# Patient Record
Sex: FEMALE | Race: WHITE | Employment: OTHER | URBAN - METROPOLITAN AREA
[De-identification: names, ages, dates, MRNs, and addresses within clinical notes are randomized per-mention and may not be internally consistent; named-entity substitution may affect disease eponyms.]

---

## 2019-08-11 ENCOUNTER — ANESTHESIA EVENT (OUTPATIENT)
Dept: SURGERY | Age: 59
DRG: 854 | End: 2019-08-11
Payer: COMMERCIAL

## 2019-08-11 ENCOUNTER — ANESTHESIA (OUTPATIENT)
Dept: SURGERY | Age: 59
DRG: 854 | End: 2019-08-11
Payer: COMMERCIAL

## 2019-08-11 ENCOUNTER — HOSPITAL ENCOUNTER (INPATIENT)
Age: 59
LOS: 4 days | Discharge: HOME | DRG: 854 | End: 2019-08-15
Attending: EMERGENCY MEDICINE | Admitting: SURGERY
Payer: COMMERCIAL

## 2019-08-11 ENCOUNTER — APPOINTMENT (OUTPATIENT)
Dept: CT IMAGING | Age: 59
DRG: 854 | End: 2019-08-11
Attending: NURSE PRACTITIONER
Payer: COMMERCIAL

## 2019-08-11 DIAGNOSIS — K80.00 ACUTE CHOLECYSTITIS DUE TO BILIARY CALCULUS: ICD-10-CM

## 2019-08-11 DIAGNOSIS — K81.0 ACUTE CHOLECYSTITIS: Primary | ICD-10-CM

## 2019-08-11 LAB
ALBUMIN SERPL-MCNC: 3.6 G/DL (ref 3.5–5)
ALBUMIN/GLOB SERPL: 0.7 {RATIO} (ref 1.1–2.2)
ALP SERPL-CCNC: 106 U/L (ref 45–117)
ALT SERPL-CCNC: 47 U/L (ref 12–78)
ANION GAP SERPL CALC-SCNC: 8 MMOL/L (ref 5–15)
APPEARANCE UR: CLEAR
AST SERPL-CCNC: 35 U/L (ref 15–37)
BACTERIA URNS QL MICRO: NEGATIVE /HPF
BASOPHILS # BLD: 0.1 K/UL (ref 0–0.1)
BASOPHILS NFR BLD: 1 % (ref 0–1)
BILIRUB SERPL-MCNC: 1 MG/DL (ref 0.2–1)
BILIRUB UR QL: NEGATIVE
BUN SERPL-MCNC: 10 MG/DL (ref 6–20)
BUN/CREAT SERPL: 11 (ref 12–20)
CALCIUM SERPL-MCNC: 9.2 MG/DL (ref 8.5–10.1)
CHLORIDE SERPL-SCNC: 99 MMOL/L (ref 97–108)
CO2 SERPL-SCNC: 30 MMOL/L (ref 21–32)
COLOR UR: ABNORMAL
COMMENT, HOLDF: NORMAL
CREAT SERPL-MCNC: 0.91 MG/DL (ref 0.55–1.02)
DIFFERENTIAL METHOD BLD: ABNORMAL
EOSINOPHIL # BLD: 0 K/UL (ref 0–0.4)
EOSINOPHIL NFR BLD: 0 % (ref 0–7)
EPITH CASTS URNS QL MICRO: ABNORMAL /LPF
ERYTHROCYTE [DISTWIDTH] IN BLOOD BY AUTOMATED COUNT: 12.2 % (ref 11.5–14.5)
GLOBULIN SER CALC-MCNC: 4.9 G/DL (ref 2–4)
GLUCOSE SERPL-MCNC: 196 MG/DL (ref 65–100)
GLUCOSE UR STRIP.AUTO-MCNC: NEGATIVE MG/DL
HCT VFR BLD AUTO: 46.7 % (ref 35–47)
HGB BLD-MCNC: 15.9 G/DL (ref 11.5–16)
HGB UR QL STRIP: NEGATIVE
HYALINE CASTS URNS QL MICRO: ABNORMAL /LPF (ref 0–5)
IMM GRANULOCYTES # BLD AUTO: 0.1 K/UL (ref 0–0.04)
IMM GRANULOCYTES NFR BLD AUTO: 1 % (ref 0–0.5)
KETONES UR QL STRIP.AUTO: 15 MG/DL
LACTATE SERPL-SCNC: 1.5 MMOL/L (ref 0.4–2)
LEUKOCYTE ESTERASE UR QL STRIP.AUTO: ABNORMAL
LIPASE SERPL-CCNC: 135 U/L (ref 73–393)
LYMPHOCYTES # BLD: 1.1 K/UL (ref 0.8–3.5)
LYMPHOCYTES NFR BLD: 10 % (ref 12–49)
MCH RBC QN AUTO: 28.6 PG (ref 26–34)
MCHC RBC AUTO-ENTMCNC: 34 G/DL (ref 30–36.5)
MCV RBC AUTO: 84 FL (ref 80–99)
MONOCYTES # BLD: 0.6 K/UL (ref 0–1)
MONOCYTES NFR BLD: 5 % (ref 5–13)
NEUTS SEG # BLD: 9.1 K/UL (ref 1.8–8)
NEUTS SEG NFR BLD: 83 % (ref 32–75)
NITRITE UR QL STRIP.AUTO: NEGATIVE
NRBC # BLD: 0 K/UL (ref 0–0.01)
NRBC BLD-RTO: 0 PER 100 WBC
PH UR STRIP: 6 [PH] (ref 5–8)
PLATELET # BLD AUTO: 216 K/UL (ref 150–400)
PMV BLD AUTO: 10.7 FL (ref 8.9–12.9)
POTASSIUM SERPL-SCNC: 3.6 MMOL/L (ref 3.5–5.1)
PROT SERPL-MCNC: 8.5 G/DL (ref 6.4–8.2)
PROT UR STRIP-MCNC: ABNORMAL MG/DL
RBC # BLD AUTO: 5.56 M/UL (ref 3.8–5.2)
RBC #/AREA URNS HPF: ABNORMAL /HPF (ref 0–5)
SAMPLES BEING HELD,HOLD: NORMAL
SODIUM SERPL-SCNC: 137 MMOL/L (ref 136–145)
SP GR UR REFRACTOMETRY: 1.01 (ref 1–1.03)
TROPONIN I SERPL-MCNC: <0.05 NG/ML
UR CULT HOLD, URHOLD: NORMAL
UROBILINOGEN UR QL STRIP.AUTO: 1 EU/DL (ref 0.2–1)
WBC # BLD AUTO: 11 K/UL (ref 3.6–11)
WBC URNS QL MICRO: ABNORMAL /HPF (ref 0–4)

## 2019-08-11 PROCEDURE — 99218 HC RM OBSERVATION: CPT

## 2019-08-11 PROCEDURE — 77030008684 HC TU ET CUF COVD -B: Performed by: ANESTHESIOLOGY

## 2019-08-11 PROCEDURE — 77030002933 HC SUT MCRYL J&J -A: Performed by: SURGERY

## 2019-08-11 PROCEDURE — 83605 ASSAY OF LACTIC ACID: CPT

## 2019-08-11 PROCEDURE — 74011000250 HC RX REV CODE- 250: Performed by: NURSE ANESTHETIST, CERTIFIED REGISTERED

## 2019-08-11 PROCEDURE — 77030009403 HC ELECTRD ENDO MEGA -B: Performed by: SURGERY

## 2019-08-11 PROCEDURE — 74177 CT ABD & PELVIS W/CONTRAST: CPT

## 2019-08-11 PROCEDURE — 76060000033 HC ANESTHESIA 1 TO 1.5 HR: Performed by: SURGERY

## 2019-08-11 PROCEDURE — 77030008756 HC TU IRR SUC STRY -B: Performed by: SURGERY

## 2019-08-11 PROCEDURE — 74011636320 HC RX REV CODE- 636/320: Performed by: RADIOLOGY

## 2019-08-11 PROCEDURE — 77030011992 HC AIRWY NASOPHGL TELE -A: Performed by: ANESTHESIOLOGY

## 2019-08-11 PROCEDURE — 88304 TISSUE EXAM BY PATHOLOGIST: CPT

## 2019-08-11 PROCEDURE — 74011250636 HC RX REV CODE- 250/636: Performed by: NURSE PRACTITIONER

## 2019-08-11 PROCEDURE — 96361 HYDRATE IV INFUSION ADD-ON: CPT

## 2019-08-11 PROCEDURE — 77030039266 HC ADH SKN EXOFIN S2SG -A: Performed by: SURGERY

## 2019-08-11 PROCEDURE — 77030031139 HC SUT VCRL2 J&J -A: Performed by: SURGERY

## 2019-08-11 PROCEDURE — 77030008602 HC TRCR ENDOSC EPATH J&J -B: Performed by: SURGERY

## 2019-08-11 PROCEDURE — 77030011640 HC PAD GRND REM COVD -A: Performed by: SURGERY

## 2019-08-11 PROCEDURE — 74011250636 HC RX REV CODE- 250/636: Performed by: SURGERY

## 2019-08-11 PROCEDURE — 85025 COMPLETE CBC W/AUTO DIFF WBC: CPT

## 2019-08-11 PROCEDURE — 74011000250 HC RX REV CODE- 250: Performed by: SURGERY

## 2019-08-11 PROCEDURE — 74011250636 HC RX REV CODE- 250/636: Performed by: NURSE ANESTHETIST, CERTIFIED REGISTERED

## 2019-08-11 PROCEDURE — 96374 THER/PROPH/DIAG INJ IV PUSH: CPT

## 2019-08-11 PROCEDURE — 77030007955 HC PCH ENDOSC SPEC J&J -B: Performed by: SURGERY

## 2019-08-11 PROCEDURE — 0FT44ZZ RESECTION OF GALLBLADDER, PERCUTANEOUS ENDOSCOPIC APPROACH: ICD-10-PCS | Performed by: SURGERY

## 2019-08-11 PROCEDURE — 76210000017 HC OR PH I REC 1.5 TO 2 HR: Performed by: SURGERY

## 2019-08-11 PROCEDURE — 77030019908 HC STETH ESOPH SIMS -A: Performed by: ANESTHESIOLOGY

## 2019-08-11 PROCEDURE — 93005 ELECTROCARDIOGRAM TRACING: CPT

## 2019-08-11 PROCEDURE — 74011250636 HC RX REV CODE- 250/636: Performed by: ANESTHESIOLOGY

## 2019-08-11 PROCEDURE — 65270000029 HC RM PRIVATE

## 2019-08-11 PROCEDURE — 77030008603 HC TRCR ENDOSC EPATH J&J -C: Performed by: SURGERY

## 2019-08-11 PROCEDURE — 77030012029 HC APPL CLP LIG COVD -C: Performed by: SURGERY

## 2019-08-11 PROCEDURE — 74011000250 HC RX REV CODE- 250: Performed by: ANESTHESIOLOGY

## 2019-08-11 PROCEDURE — 80053 COMPREHEN METABOLIC PANEL: CPT

## 2019-08-11 PROCEDURE — 36415 COLL VENOUS BLD VENIPUNCTURE: CPT

## 2019-08-11 PROCEDURE — 77030003580 HC NDL INSUF VERES J&J -B: Performed by: SURGERY

## 2019-08-11 PROCEDURE — 83690 ASSAY OF LIPASE: CPT

## 2019-08-11 PROCEDURE — 74011000258 HC RX REV CODE- 258: Performed by: EMERGENCY MEDICINE

## 2019-08-11 PROCEDURE — 77030018836 HC SOL IRR NACL ICUM -A: Performed by: SURGERY

## 2019-08-11 PROCEDURE — 81001 URINALYSIS AUTO W/SCOPE: CPT

## 2019-08-11 PROCEDURE — 76010000149 HC OR TIME 1 TO 1.5 HR: Performed by: SURGERY

## 2019-08-11 PROCEDURE — 77030026438 HC STYL ET INTUB CARD -A: Performed by: ANESTHESIOLOGY

## 2019-08-11 PROCEDURE — 74011250636 HC RX REV CODE- 250/636: Performed by: EMERGENCY MEDICINE

## 2019-08-11 PROCEDURE — 84484 ASSAY OF TROPONIN QUANT: CPT

## 2019-08-11 PROCEDURE — 99285 EMERGENCY DEPT VISIT HI MDM: CPT

## 2019-08-11 RX ORDER — LABETALOL HYDROCHLORIDE 5 MG/ML
10 INJECTION, SOLUTION INTRAVENOUS ONCE
Status: COMPLETED | OUTPATIENT
Start: 2019-08-11 | End: 2019-08-11

## 2019-08-11 RX ORDER — FENTANYL CITRATE 50 UG/ML
INJECTION, SOLUTION INTRAMUSCULAR; INTRAVENOUS AS NEEDED
Status: DISCONTINUED | OUTPATIENT
Start: 2019-08-11 | End: 2019-08-11 | Stop reason: HOSPADM

## 2019-08-11 RX ORDER — LIDOCAINE HYDROCHLORIDE 10 MG/ML
0.1 INJECTION, SOLUTION EPIDURAL; INFILTRATION; INTRACAUDAL; PERINEURAL AS NEEDED
Status: CANCELLED | OUTPATIENT
Start: 2019-08-11

## 2019-08-11 RX ORDER — NEOSTIGMINE METHYLSULFATE 1 MG/ML
INJECTION INTRAVENOUS AS NEEDED
Status: DISCONTINUED | OUTPATIENT
Start: 2019-08-11 | End: 2019-08-11 | Stop reason: HOSPADM

## 2019-08-11 RX ORDER — SODIUM CHLORIDE 0.9 % (FLUSH) 0.9 %
5-40 SYRINGE (ML) INJECTION EVERY 8 HOURS
Status: CANCELLED | OUTPATIENT
Start: 2019-08-11

## 2019-08-11 RX ORDER — ONDANSETRON 2 MG/ML
4 INJECTION INTRAMUSCULAR; INTRAVENOUS
Status: DISCONTINUED | OUTPATIENT
Start: 2019-08-11 | End: 2019-08-15 | Stop reason: HOSPADM

## 2019-08-11 RX ORDER — ROCURONIUM BROMIDE 10 MG/ML
INJECTION, SOLUTION INTRAVENOUS AS NEEDED
Status: DISCONTINUED | OUTPATIENT
Start: 2019-08-11 | End: 2019-08-11 | Stop reason: HOSPADM

## 2019-08-11 RX ORDER — AMOXICILLIN AND CLAVULANATE POTASSIUM 875; 125 MG/1; MG/1
TABLET, FILM COATED ORAL EVERY 12 HOURS
COMMUNITY
End: 2019-08-15

## 2019-08-11 RX ORDER — LIDOCAINE HYDROCHLORIDE 20 MG/ML
INJECTION, SOLUTION EPIDURAL; INFILTRATION; INTRACAUDAL; PERINEURAL AS NEEDED
Status: DISCONTINUED | OUTPATIENT
Start: 2019-08-11 | End: 2019-08-11 | Stop reason: HOSPADM

## 2019-08-11 RX ORDER — BUPIVACAINE HYDROCHLORIDE AND EPINEPHRINE 5; 5 MG/ML; UG/ML
INJECTION, SOLUTION EPIDURAL; INTRACAUDAL; PERINEURAL AS NEEDED
Status: DISCONTINUED | OUTPATIENT
Start: 2019-08-11 | End: 2019-08-11 | Stop reason: HOSPADM

## 2019-08-11 RX ORDER — SODIUM CHLORIDE, SODIUM LACTATE, POTASSIUM CHLORIDE, CALCIUM CHLORIDE 600; 310; 30; 20 MG/100ML; MG/100ML; MG/100ML; MG/100ML
125 INJECTION, SOLUTION INTRAVENOUS CONTINUOUS
Status: DISCONTINUED | OUTPATIENT
Start: 2019-08-11 | End: 2019-08-11 | Stop reason: HOSPADM

## 2019-08-11 RX ORDER — SODIUM CHLORIDE 0.9 % (FLUSH) 0.9 %
5-40 SYRINGE (ML) INJECTION AS NEEDED
Status: DISCONTINUED | OUTPATIENT
Start: 2019-08-11 | End: 2019-08-11 | Stop reason: HOSPADM

## 2019-08-11 RX ORDER — OXYCODONE AND ACETAMINOPHEN 5; 325 MG/1; MG/1
1-2 TABLET ORAL
Status: DISCONTINUED | OUTPATIENT
Start: 2019-08-11 | End: 2019-08-15 | Stop reason: HOSPADM

## 2019-08-11 RX ORDER — AMOXICILLIN AND CLAVULANATE POTASSIUM 875; 125 MG/1; MG/1
1 TABLET, FILM COATED ORAL 2 TIMES DAILY
Qty: 14 TAB | Refills: 0 | Status: SHIPPED | OUTPATIENT
Start: 2019-08-11 | End: 2019-08-22

## 2019-08-11 RX ORDER — DEXAMETHASONE SODIUM PHOSPHATE 4 MG/ML
INJECTION, SOLUTION INTRA-ARTICULAR; INTRALESIONAL; INTRAMUSCULAR; INTRAVENOUS; SOFT TISSUE AS NEEDED
Status: DISCONTINUED | OUTPATIENT
Start: 2019-08-11 | End: 2019-08-11 | Stop reason: HOSPADM

## 2019-08-11 RX ORDER — ONDANSETRON 2 MG/ML
INJECTION INTRAMUSCULAR; INTRAVENOUS AS NEEDED
Status: DISCONTINUED | OUTPATIENT
Start: 2019-08-11 | End: 2019-08-11 | Stop reason: HOSPADM

## 2019-08-11 RX ORDER — MORPHINE SULFATE 4 MG/ML
2 INJECTION INTRAVENOUS
Status: DISCONTINUED | OUTPATIENT
Start: 2019-08-11 | End: 2019-08-15 | Stop reason: HOSPADM

## 2019-08-11 RX ORDER — SODIUM CHLORIDE, SODIUM LACTATE, POTASSIUM CHLORIDE, CALCIUM CHLORIDE 600; 310; 30; 20 MG/100ML; MG/100ML; MG/100ML; MG/100ML
125 INJECTION, SOLUTION INTRAVENOUS CONTINUOUS
Status: CANCELLED | OUTPATIENT
Start: 2019-08-11 | End: 2019-08-12

## 2019-08-11 RX ORDER — SODIUM CHLORIDE, SODIUM LACTATE, POTASSIUM CHLORIDE, CALCIUM CHLORIDE 600; 310; 30; 20 MG/100ML; MG/100ML; MG/100ML; MG/100ML
INJECTION, SOLUTION INTRAVENOUS
Status: DISCONTINUED | OUTPATIENT
Start: 2019-08-11 | End: 2019-08-11 | Stop reason: HOSPADM

## 2019-08-11 RX ORDER — HYDRALAZINE HYDROCHLORIDE 20 MG/ML
5 INJECTION INTRAMUSCULAR; INTRAVENOUS ONCE
Status: COMPLETED | OUTPATIENT
Start: 2019-08-11 | End: 2019-08-11

## 2019-08-11 RX ORDER — SODIUM CHLORIDE 0.9 % (FLUSH) 0.9 %
5-40 SYRINGE (ML) INJECTION AS NEEDED
Status: CANCELLED | OUTPATIENT
Start: 2019-08-11

## 2019-08-11 RX ORDER — DIPHENHYDRAMINE HYDROCHLORIDE 50 MG/ML
12.5 INJECTION, SOLUTION INTRAMUSCULAR; INTRAVENOUS AS NEEDED
Status: DISCONTINUED | OUTPATIENT
Start: 2019-08-11 | End: 2019-08-11 | Stop reason: HOSPADM

## 2019-08-11 RX ORDER — OXYCODONE AND ACETAMINOPHEN 5; 325 MG/1; MG/1
1-2 TABLET ORAL
Qty: 25 TAB | Refills: 0 | Status: SHIPPED | OUTPATIENT
Start: 2019-08-11 | End: 2019-08-16

## 2019-08-11 RX ORDER — LABETALOL HYDROCHLORIDE 5 MG/ML
INJECTION, SOLUTION INTRAVENOUS AS NEEDED
Status: DISCONTINUED | OUTPATIENT
Start: 2019-08-11 | End: 2019-08-11 | Stop reason: HOSPADM

## 2019-08-11 RX ORDER — PROPOFOL 10 MG/ML
INJECTION, EMULSION INTRAVENOUS AS NEEDED
Status: DISCONTINUED | OUTPATIENT
Start: 2019-08-11 | End: 2019-08-11 | Stop reason: HOSPADM

## 2019-08-11 RX ORDER — HYDROMORPHONE HYDROCHLORIDE 1 MG/ML
.25-1 INJECTION, SOLUTION INTRAMUSCULAR; INTRAVENOUS; SUBCUTANEOUS
Status: DISCONTINUED | OUTPATIENT
Start: 2019-08-11 | End: 2019-08-11 | Stop reason: HOSPADM

## 2019-08-11 RX ORDER — FLUMAZENIL 0.1 MG/ML
0.2 INJECTION INTRAVENOUS
Status: CANCELLED | OUTPATIENT
Start: 2019-08-11

## 2019-08-11 RX ORDER — GLYCOPYRROLATE 0.2 MG/ML
INJECTION INTRAMUSCULAR; INTRAVENOUS AS NEEDED
Status: DISCONTINUED | OUTPATIENT
Start: 2019-08-11 | End: 2019-08-11 | Stop reason: HOSPADM

## 2019-08-11 RX ORDER — SUCCINYLCHOLINE CHLORIDE 20 MG/ML
INJECTION INTRAMUSCULAR; INTRAVENOUS AS NEEDED
Status: DISCONTINUED | OUTPATIENT
Start: 2019-08-11 | End: 2019-08-11 | Stop reason: HOSPADM

## 2019-08-11 RX ORDER — NALOXONE HYDROCHLORIDE 0.4 MG/ML
0.04 INJECTION, SOLUTION INTRAMUSCULAR; INTRAVENOUS; SUBCUTANEOUS
Status: CANCELLED | OUTPATIENT
Start: 2019-08-11

## 2019-08-11 RX ORDER — HYDROMORPHONE HYDROCHLORIDE 2 MG/ML
INJECTION, SOLUTION INTRAMUSCULAR; INTRAVENOUS; SUBCUTANEOUS AS NEEDED
Status: DISCONTINUED | OUTPATIENT
Start: 2019-08-11 | End: 2019-08-11 | Stop reason: HOSPADM

## 2019-08-11 RX ORDER — SODIUM CHLORIDE, SODIUM LACTATE, POTASSIUM CHLORIDE, CALCIUM CHLORIDE 600; 310; 30; 20 MG/100ML; MG/100ML; MG/100ML; MG/100ML
125 INJECTION, SOLUTION INTRAVENOUS CONTINUOUS
Status: DISCONTINUED | OUTPATIENT
Start: 2019-08-11 | End: 2019-08-12

## 2019-08-11 RX ORDER — SODIUM CHLORIDE 0.9 % (FLUSH) 0.9 %
5-40 SYRINGE (ML) INJECTION EVERY 8 HOURS
Status: DISCONTINUED | OUTPATIENT
Start: 2019-08-11 | End: 2019-08-11 | Stop reason: HOSPADM

## 2019-08-11 RX ORDER — MIDAZOLAM HYDROCHLORIDE 1 MG/ML
INJECTION, SOLUTION INTRAMUSCULAR; INTRAVENOUS AS NEEDED
Status: DISCONTINUED | OUTPATIENT
Start: 2019-08-11 | End: 2019-08-11 | Stop reason: HOSPADM

## 2019-08-11 RX ADMIN — NEOSTIGMINE METHYLSULFATE 3 MG: 1 INJECTION, SOLUTION INTRAVENOUS at 20:58

## 2019-08-11 RX ADMIN — FENTANYL CITRATE 200 MCG: 50 INJECTION, SOLUTION INTRAMUSCULAR; INTRAVENOUS at 20:03

## 2019-08-11 RX ADMIN — ONDANSETRON 4 MG: 2 INJECTION INTRAMUSCULAR; INTRAVENOUS at 20:46

## 2019-08-11 RX ADMIN — IOPAMIDOL 100 ML: 755 INJECTION, SOLUTION INTRAVENOUS at 17:41

## 2019-08-11 RX ADMIN — LABETALOL HYDROCHLORIDE 10 MG: 5 INJECTION INTRAVENOUS at 20:22

## 2019-08-11 RX ADMIN — FENTANYL CITRATE 50 MCG: 50 INJECTION, SOLUTION INTRAMUSCULAR; INTRAVENOUS at 20:43

## 2019-08-11 RX ADMIN — SUCCINYLCHOLINE CHLORIDE 100 MG: 20 INJECTION, SOLUTION INTRAMUSCULAR; INTRAVENOUS; PARENTERAL at 20:03

## 2019-08-11 RX ADMIN — SODIUM CHLORIDE, POTASSIUM CHLORIDE, SODIUM LACTATE AND CALCIUM CHLORIDE: 600; 310; 30; 20 INJECTION, SOLUTION INTRAVENOUS at 20:30

## 2019-08-11 RX ADMIN — PIPERACILLIN SODIUM,TAZOBACTAM SODIUM 3.38 G: 3; .375 INJECTION, POWDER, FOR SOLUTION INTRAVENOUS at 19:10

## 2019-08-11 RX ADMIN — HYDROMORPHONE HYDROCHLORIDE 0.5 MG: 1 INJECTION, SOLUTION INTRAMUSCULAR; INTRAVENOUS; SUBCUTANEOUS at 21:55

## 2019-08-11 RX ADMIN — PROPOFOL 200 MG: 10 INJECTION, EMULSION INTRAVENOUS at 20:03

## 2019-08-11 RX ADMIN — SODIUM CHLORIDE 1000 ML: 900 INJECTION, SOLUTION INTRAVENOUS at 17:13

## 2019-08-11 RX ADMIN — SODIUM CHLORIDE, SODIUM LACTATE, POTASSIUM CHLORIDE, AND CALCIUM CHLORIDE 125 ML/HR: 600; 310; 30; 20 INJECTION, SOLUTION INTRAVENOUS at 21:54

## 2019-08-11 RX ADMIN — ROCURONIUM BROMIDE 10 MG: 50 INJECTION, SOLUTION INTRAVENOUS at 20:03

## 2019-08-11 RX ADMIN — MIDAZOLAM HYDROCHLORIDE 2 MG: 1 INJECTION, SOLUTION INTRAMUSCULAR; INTRAVENOUS at 19:56

## 2019-08-11 RX ADMIN — LABETALOL HYDROCHLORIDE 10 MG: 5 INJECTION INTRAVENOUS at 22:04

## 2019-08-11 RX ADMIN — SODIUM CHLORIDE, POTASSIUM CHLORIDE, SODIUM LACTATE AND CALCIUM CHLORIDE: 600; 310; 30; 20 INJECTION, SOLUTION INTRAVENOUS at 19:56

## 2019-08-11 RX ADMIN — HYDROMORPHONE HYDROCHLORIDE 1 MG: 2 INJECTION INTRAMUSCULAR; INTRAVENOUS; SUBCUTANEOUS at 21:04

## 2019-08-11 RX ADMIN — HYDRALAZINE HYDROCHLORIDE 5 MG: 20 INJECTION INTRAMUSCULAR; INTRAVENOUS at 22:23

## 2019-08-11 RX ADMIN — GLYCOPYRROLATE 0.4 MG: 0.2 INJECTION, SOLUTION INTRAMUSCULAR; INTRAVENOUS at 20:58

## 2019-08-11 RX ADMIN — LIDOCAINE HYDROCHLORIDE 100 MG: 20 INJECTION, SOLUTION INTRAVENOUS at 20:03

## 2019-08-11 RX ADMIN — ROCURONIUM BROMIDE 20 MG: 50 INJECTION, SOLUTION INTRAVENOUS at 20:08

## 2019-08-11 RX ADMIN — DEXAMETHASONE SODIUM PHOSPHATE 4 MG: 4 INJECTION, SOLUTION INTRAMUSCULAR; INTRAVENOUS at 20:08

## 2019-08-11 NOTE — ED PROVIDER NOTES
61 y.o. female with no significant past medical history who presents from home via private vehicle with chief complaint of abdominal pain. Patient c/o RUQ and epigastric \"deep and intense\" abdominal pain for 2.5 weeks that significantly worsened last night after eating. He also c/o nausea, \"bilious vomiting\", and intermittent chest pain \"that is coming from my abdomen\". Denies previous GI history. Denies taking any baseline medication. Specifically denies fever, chills, night sweats, dyspnea, and any other pain or symptoms. There are no other acute medical concerns at this time. Social hx: Never tobacco smoker; Yes EtOH use; Denies illicit drug use  PCP: None    Note written by Carly Lemus, as dictated by Cristal Story NP 4:16 PM    The history is provided by the patient and medical records. No  was used. History reviewed. No pertinent past medical history. History reviewed. No pertinent surgical history. Family History:   Family history unknown: Yes       Social History     Socioeconomic History    Marital status:      Spouse name: Not on file    Number of children: Not on file    Years of education: Not on file    Highest education level: Not on file   Occupational History    Not on file   Social Needs    Financial resource strain: Not on file    Food insecurity:     Worry: Not on file     Inability: Not on file    Transportation needs:     Medical: Not on file     Non-medical: Not on file   Tobacco Use    Smoking status: Never Smoker    Smokeless tobacco: Never Used   Substance and Sexual Activity    Alcohol use:  Yes     Alcohol/week: 1.0 standard drinks     Types: 1 Glasses of wine per week     Comment: 1-3 drinks per month    Drug use: Never    Sexual activity: Not on file   Lifestyle    Physical activity:     Days per week: Not on file     Minutes per session: Not on file    Stress: Not on file   Relationships    Social connections:     Talks on phone: Not on file     Gets together: Not on file     Attends Adventism service: Not on file     Active member of club or organization: Not on file     Attends meetings of clubs or organizations: Not on file     Relationship status: Not on file    Intimate partner violence:     Fear of current or ex partner: Not on file     Emotionally abused: Not on file     Physically abused: Not on file     Forced sexual activity: Not on file   Other Topics Concern    Not on file   Social History Narrative    Not on file         ALLERGIES: Patient has no known allergies. Review of Systems   Constitutional: Negative for chills, diaphoresis and fever. Respiratory: Negative for shortness of breath. Cardiovascular: Positive for chest pain. Gastrointestinal: Positive for abdominal pain, nausea and vomiting. All other systems reviewed and are negative. Vitals:    08/11/19 1615 08/11/19 1617 08/11/19 1623   BP: (!) 206/105 (!) 206/105    Pulse:  (!) 122    Resp:  20    Temp:  98.9 °F (37.2 °C)    SpO2: 97% 95% 95%   Weight:  83.9 kg (185 lb)    Height:  5' 2\" (1.575 m)             Physical Exam   Constitutional: She is oriented to person, place, and time. She appears well-developed and well-nourished. Non-toxic appearance. No distress. HENT:   Head: Normocephalic and atraumatic. Right Ear: External ear normal.   Left Ear: External ear normal.   Nose: Nose normal.   Mouth/Throat: Oropharynx is clear and moist. No oropharyngeal exudate. Eyes: Pupils are equal, round, and reactive to light. Conjunctivae and EOM are normal. No scleral icterus. Neck: Normal range of motion. Neck supple. Cardiovascular: Normal rate, regular rhythm, normal heart sounds, intact distal pulses and normal pulses. Pulmonary/Chest: Effort normal and breath sounds normal. She has no wheezes. She exhibits no tenderness. Abdominal: Soft.  Normal appearance, normal aorta and bowel sounds are normal. She exhibits no distension, no abdominal bruit, no ascites, no pulsatile midline mass and no mass. There is no hepatosplenomegaly. There is tenderness in the right lower quadrant. There is positive Smith's sign. There is no rebound, no guarding, no CVA tenderness and no tenderness at McBurney's point. No hernia. Hernia confirmed negative in the ventral area. No Otto's, Grey-Ceballos's or psoas signs. No prominent varicosities. Musculoskeletal: Normal range of motion. She exhibits no edema or deformity. Lymphadenopathy:     She has no cervical adenopathy. Neurological: She is alert and oriented to person, place, and time. No cranial nerve deficit or sensory deficit. She exhibits normal muscle tone. Coordination normal.   Skin: Skin is warm and dry. Capillary refill takes less than 2 seconds. No rash noted. She is not diaphoretic. Psychiatric: She has a normal mood and affect. Her behavior is normal. Judgment and thought content normal.   Nursing note and vitals reviewed. MDM  Number of Diagnoses or Management Options  Acute cholecystitis:   Diagnosis management comments:     Almost three week of RUQ pain that became significantly worse last night. Comments on the pain being worse after eating, and can last about five hours. She is presently not having any pain. She commented on some chest pain, however she believes this to be more of the abdominal pain radiating towards her chest. She's not dyspneic or have PND symptoms. On exam, she's tender in the RUQ, but otherwise unremarkable. ECG reveals a sinus tachycardia, troponin is negative. CT with acute cholecystitis. Spoke with Dr. Pretty Smith, who plans to come see the patient. Nontoxic appearing on repeat evaluation.         Amount and/or Complexity of Data Reviewed  Clinical lab tests: ordered and reviewed  Tests in the radiology section of CPT®: ordered and reviewed  Tests in the medicine section of CPT®: reviewed and ordered  Obtain history from someone other than the patient: yes  Discuss the patient with other providers: yes  Independent visualization of images, tracings, or specimens: yes           Procedures

## 2019-08-11 NOTE — ANESTHESIA PREPROCEDURE EVALUATION
Anesthetic History   No history of anesthetic complications            Review of Systems / Medical History  Patient summary reviewed and pertinent labs reviewed    Pulmonary  Within defined limits                 Neuro/Psych   Within defined limits           Cardiovascular  Within defined limits                     GI/Hepatic/Renal  Within defined limits             Comments: Acute cholecystitis Endo/Other        Obesity     Other Findings            Physical Exam    Airway  Mallampati: III    Neck ROM: normal range of motion   Mouth opening: Normal     Cardiovascular    Rhythm: regular  Rate: normal         Dental  No notable dental hx       Pulmonary  Breath sounds clear to auscultation               Abdominal  GI exam deferred       Other Findings            Anesthetic Plan    ASA: 2  Anesthesia type: general          Induction: Intravenous  Anesthetic plan and risks discussed with: Patient

## 2019-08-11 NOTE — PROGRESS NOTES
[facility]: MED RECONCILIATION    Comments/Recommendations:   Patient alert and oriented at time of interview. Patient confirmed name and date of birth. Patient stated that she does not take any prescription medications, over-the-counter medications, supplements, or herbal supplements. Preferred pharmacy is [facility] on [address]. Information obtained from: patient    Allergies: Patient has no known allergies.     Prior to Admission Medications:  None     Aakash Barrios, Pharmacist

## 2019-08-11 NOTE — ED TRIAGE NOTES
Pt c/o RUQ pain and mid sternal dull chest pain that became worse today and last night; pt states has been feeling bad x 2 weeks. C/o mild nausea with small bile like vomiting. No SOB. Skin warm and dry, anxious.

## 2019-08-12 LAB
ATRIAL RATE: 103 BPM
CALCULATED P AXIS, ECG09: 33 DEGREES
CALCULATED R AXIS, ECG10: 36 DEGREES
CALCULATED T AXIS, ECG11: 40 DEGREES
DIAGNOSIS, 93000: NORMAL
P-R INTERVAL, ECG05: 124 MS
Q-T INTERVAL, ECG07: 366 MS
QRS DURATION, ECG06: 96 MS
QTC CALCULATION (BEZET), ECG08: 479 MS
VENTRICULAR RATE, ECG03: 103 BPM

## 2019-08-12 PROCEDURE — 77010033678 HC OXYGEN DAILY

## 2019-08-12 PROCEDURE — 65270000029 HC RM PRIVATE

## 2019-08-12 PROCEDURE — 74011000258 HC RX REV CODE- 258: Performed by: SURGERY

## 2019-08-12 PROCEDURE — 74011250636 HC RX REV CODE- 250/636: Performed by: SURGERY

## 2019-08-12 PROCEDURE — 99218 HC RM OBSERVATION: CPT

## 2019-08-12 PROCEDURE — 94760 N-INVAS EAR/PLS OXIMETRY 1: CPT

## 2019-08-12 PROCEDURE — 74011250637 HC RX REV CODE- 250/637: Performed by: SURGERY

## 2019-08-12 PROCEDURE — 77030027138 HC INCENT SPIROMETER -A

## 2019-08-12 RX ADMIN — PIPERACILLIN SODIUM,TAZOBACTAM SODIUM 3.38 G: 3; .375 INJECTION, POWDER, FOR SOLUTION INTRAVENOUS at 16:44

## 2019-08-12 RX ADMIN — SODIUM CHLORIDE, SODIUM LACTATE, POTASSIUM CHLORIDE, AND CALCIUM CHLORIDE 125 ML/HR: 600; 310; 30; 20 INJECTION, SOLUTION INTRAVENOUS at 09:16

## 2019-08-12 RX ADMIN — OXYCODONE HYDROCHLORIDE AND ACETAMINOPHEN 1 TABLET: 5; 325 TABLET ORAL at 07:47

## 2019-08-12 RX ADMIN — PIPERACILLIN SODIUM,TAZOBACTAM SODIUM 3.38 G: 3; .375 INJECTION, POWDER, FOR SOLUTION INTRAVENOUS at 09:06

## 2019-08-12 RX ADMIN — PIPERACILLIN SODIUM,TAZOBACTAM SODIUM 3.38 G: 3; .375 INJECTION, POWDER, FOR SOLUTION INTRAVENOUS at 00:23

## 2019-08-12 RX ADMIN — OXYCODONE HYDROCHLORIDE AND ACETAMINOPHEN 1 TABLET: 5; 325 TABLET ORAL at 16:44

## 2019-08-12 RX ADMIN — OXYCODONE HYDROCHLORIDE AND ACETAMINOPHEN 1 TABLET: 5; 325 TABLET ORAL at 00:23

## 2019-08-12 NOTE — PROGRESS NOTES
General Surgery Daily Progress Note    Patient: Alex Clinton MRN: [id number]  SSN: [id number]    YOB: 1960  Age: 61 y.o. Sex: female      Admit Date: 8/11/2019    Subjective:   Pain controlled, no N/V, tolerating diet. Current Facility-Administered Medications   Medication Dose Route Frequency    ondansetron (ZOFRAN) injection 4 mg  4 mg IntraVENous Q6H PRN    morphine injection 2 mg  2 mg IntraVENous Q2H PRN    oxyCODONE-acetaminophen (PERCOCET) 5-325 mg per tablet 1-2 Tab  1-2 Tab Oral Q4H PRN    lactated Ringers infusion  125 mL/hr IntraVENous CONTINUOUS    piperacillin-tazobactam (ZOSYN) 3.375 g in 0.9% sodium chloride (MBP/ADV) 100 mL  3.375 g IntraVENous Q8H        Objective:   08/12 0701 - 08/12 1900  In: 240 [P.O.:240]  Out: -   08/10 1901 - 08/12 0700  In: 1800 [I.V.:1800]  Out: 110 [Drains:60]  Patient Vitals for the past 8 hrs:   BP Temp Pulse Resp SpO2   08/12/19 0745 181/83 98.2 °F (36.8 °C) 98 20 92 %   08/12/19 0404 145/88 97.7 °F (36.5 °C) 93 16 93 %       Physical Exam:  General: Alert, cooperative, NAD  Lungs: Unlabored  Heart:  Regular rate and  rhythm  Abdomen: Soft, ATTP, mildly distended. + bowel sounds. Incisions c/d/i. SHERIDAN drain SS.    Extremities: Warm, moves all, no edema  Skin:  Warm and dry, no rash    Labs:   Recent Labs     08/11/19  1627   WBC 11.0   HGB 15.9   HCT 46.7        Recent Labs     08/11/19  1627      K 3.6   CL 99   CO2 30   *   BUN 10   CREA 0.91   CA 9.2   ALB 3.6   TBILI 1.0   SGOT 35   ALT 47       Assessment / Plan:   · POD#1 lap evy  · Abdominal exam appropriate  · Will need another 24 hours of IV ABX d/t severity of infection  · Diet as tolerated  · Mobilize  · Keep drain

## 2019-08-12 NOTE — PROGRESS NOTES
Bedside shift change report given to Karri (oncoming nurse) by RN (offgoing nurse). Report included the following information SBAR, Kardex, Intake/Output and Recent Results. Bedside shift change report given to RN (oncoming nurse) by Gideon Goldmann (offgoing nurse). Report included the following information SBAR, Kardex, Intake/Output and Recent Results.

## 2019-08-12 NOTE — H&P
[facility] [place] [facility]  HISTORY AND PHYSICAL    Name:  Chan Duggan  MR#:  [id number]  :  1960  ACCOUNT #:  [de-identified]  ADMIT DATE:  2019    ER CONSULT/ADMISSION HISTORY AND PHYSICAL    CHIEF COMPLAINT:  Acute cholecystitis. HISTORY OF PRESENT ILLNESS:  This is a 70-year-old  woman, seen, evaluated, and examined at the request of nurse practitioner, Teresita Manning of Emergency Services. The patient has an approximately 2-1/2-week history of an intermittent epigastric right upper quadrant pain. On Friday, 2019, she had a meal of roast beef sandwich with au jus sauce and has had intensified pain since then. She said she has had some mild nausea. She said she has frequent post sinus drip and gag whole night and retched a few times but never really any actual vomiting. No fevers. She may have felt clammy during the spitting up episodes but no diarrhea. No blood in the stools. No choleuria and no history of pancreatitis. She came in the emergency room and had CT scan that showed thickened gallbladder wall and some pericholecystic fluid consistent with acute cholecystitis. Request is made for surgical evaluation and possible intervention. PAST MEDICAL HISTORY:  Negative. PAST SURGICAL HISTORY:  Negative. ALLERGIES:  NO KNOWN DRUG ALLERGIES. CHRONIC HOME MEDICATIONS:  None. SOCIAL HISTORY:  She does not drink, rare glass of wine. No illicit drug use. FAMILY HISTORY:  Noncontributory. REVIEW OF SYSTEMS:  Review of systems x10 is otherwise unremarkable. PHYSICAL EXAMINATION:  GENERAL:  Reveals a pleasant plump  woman, resting comfortably in the gurney. VITAL SIGNS:  On presentation, she was slightly tachycardic and somewhat hypertensive, much better now but no fever. HEENT EXAM:  Normocephalic, atraumatic. Oral mucosa is dry and pink. Sclerae nonicteric. NECK:  Supple and nontender. Trachea is midline.   LUNGS:  Clear to auscultation anteriorly. CARDIAC EXAM:  Regular rate and rhythm. No murmurs, rubs, or gallops appreciated. ABDOMEN:  Protuberant, soft and mild right upper quadrant tenderness. No mass. No guarding. BACK:  Shows no midline or costovertebral angle tenderness or step-off. EXTREMITIES:  Show fair range of motion. No deformity. LABORATORY DATA:  WBC, hemoglobin, platelet counts are normal.  Urinalysis appears uninfected. Electrolytes, BUN and creatinine are normal.  Liver function tests are normal.  Lipase is normal.    IMAGING:  CT scan findings as described. ASSESSMENT:  Probable acute-on-chronic cholecystitis. PLAN:  Laparoscopic cholecystectomy. The patient was counseled for the same, attendant risks including bleeding, infection, conversion to open, common bile duct injury, requiring complex reconstruction, chronic postoperative diarrhea. She expressed desire to proceed as outlined.     Thank you for your kind referral.        Eh Mooney MD      [id number]  D:  08/11/2019 19:46  T:  08/11/2019 22:09  JOB #:  [id number]  CC:  Otis Metz NP

## 2019-08-12 NOTE — PROGRESS NOTES
8/12/2019 9:15 AM Met with pt and pt's . Charted address and phone numbers confirmed. Pt reported her address was listed incorrectly, pt's correct address is [place]. Observation notice provided in writing to patient and/or caregiver as well as verbal explanation of the policy. Patients who are in outpatient status also receive the Observation notice. Reason for Admission:  Acute cholecystitis. RRAT Score:  2                   Plan for utilizing home health: N/a                          Current Advanced Directive/Advance Care Plan:   Orville Girard Spouse   [phone]                          Transition of Care Plan:  Home with family     Pt lives with her  at home who will be able to assist if needed after discharge. Pt has no history of home health and does not own any dme. Pt has rx coverage and fills her scripts at the [facility] on [address] and [facility]. Pt was independent with adls and driving prior to admission. Pt's  will transport pt home at discharge. CM will follow.  USHA Santiago  Care Management Interventions  PCP Verified by CM: Yes(Pt plans to have Makenna Suh as her PCP but has not established care with this provider yet. )  MyChart Signup: No  Discharge Durable Medical Equipment: No  Physical Therapy Consult: No  Occupational Therapy Consult: No  Speech Therapy Consult: No  Current Support Network: Lives with Spouse

## 2019-08-12 NOTE — PROGRESS NOTES
Adjusted Zosyn 3.375 gm IV from Q6H to Q8H per protocol for CrCl ~67 mL/min    Thank you,  [facility]

## 2019-08-12 NOTE — ANESTHESIA POSTPROCEDURE EVALUATION
Procedure(s):  CHOLECYSTECTOMY LAPAROSCOPIC. general    Anesthesia Post Evaluation      Multimodal analgesia: multimodal analgesia not used between 6 hours prior to anesthesia start to PACU discharge  Patient location during evaluation: PACU  Patient participation: complete - patient participated  Level of consciousness: awake  Pain management: adequate  Airway patency: patent  Anesthetic complications: no  Cardiovascular status: acceptable and hemodynamically stable  Respiratory status: acceptable  Hydration status: acceptable  Post anesthesia nausea and vomiting:  controlled      Vitals Value Taken Time   /68 8/11/2019 10:36 PM   Temp 36.4 °C (97.6 °F) 8/11/2019  9:19 PM   Pulse 71 8/11/2019 10:37 PM   Resp 17 8/11/2019 10:37 PM   SpO2 95 % 8/11/2019 10:37 PM   Vitals shown include unvalidated device data.

## 2019-08-12 NOTE — PERIOP NOTES
TRANSFER - OUT REPORT:    Verbal report given to Veronica Mena RN(name) on Magdiel Dasilva  being transferred to room 421(unit) for routine post - op       Report consisted of patients Situation, Background, Assessment and   Recommendations(SBAR). Information from the following report(s) Procedure Summary, Intake/Output and MAR was reviewed with the receiving nurse. Lines:   Peripheral IV 08/11/19 Right Wrist (Active)   Site Assessment Clean, dry, & intact 8/11/2019 10:39 PM   Phlebitis Assessment 0 8/11/2019 10:39 PM   Infiltration Assessment 0 8/11/2019 10:39 PM   Dressing Status Clean, dry, & intact 8/11/2019 10:39 PM   Dressing Type Tape;Transparent 8/11/2019 10:39 PM   Hub Color/Line Status Pink; Infusing 8/11/2019 10:39 PM   Alcohol Cap Used Yes 8/11/2019 10:39 PM        Opportunity for questions and clarification was provided. Patient transported with:   Registered Nurse Phone report given to RN. Patient comfortable and family at bedside.

## 2019-08-12 NOTE — PROGRESS NOTES
Spiritual Care Assessment/Progress Note  [address]      NAME: Gladys Stone      MRN: [id number]  AGE: 61 y.o.  SEX: female  Zoroastrianism Affiliation: No preference   Language: English     8/12/2019     Total Time (in minutes): 10     Spiritual Assessment begun in [facility] POST SURG ORT 2 through conversation with:         [x]Patient        [] Family    [] Friend(s)        Reason for Consult: Request by patient     Spiritual beliefs: (Please include comment if needed)     [x] Identifies with a keron tradition: Anabaptism        [x] Supported by a keron community: [employer]            [] Claims no spiritual orientation:           [] Seeking spiritual identity:                [] Adheres to an individual form of spirituality:           [] Not able to assess:                           Identified resources for coping:      [] Prayer                               [] Music                  [] Guided Imagery     [x] Family/friends                 [] Pet visits     [] Devotional reading                         [] Unknown     [] Other:                                              Interventions offered during this visit: (See comments for more details)    Patient Interventions: Affirmation of emotions/emotional suffering, Coordination with community clergy, Coping skills reviewed/reinforced           Plan of Care:     [x] Support spiritual and/or cultural needs    [] Support AMD and/or advance care planning process      [] Support grieving process   [] Coordinate Rites and/or Rituals    [] Coordination with community clergy   [] No spiritual needs identified at this time   [] Detailed Plan of Care below (See Comments)  [] Make referral to Music Therapy  [] Make referral to Pet Therapy     [] Make referral to Addiction services  [] Make referral to [facility]  [] Make referral to Spiritual Care Partner  [] No future visits requested        [x] Follow up visits as needed     Comments:      visited patient, Mary Taylor, for an initial visit on the Post. Surgical Ortho. Mary Taylor was awake, alert, and sitting up in bed when  came to visit. Her  was sitting at the bedside.  introduced herself and extended support through active listening and pastoral presence. Mary Taylor greeted the  warmly and made good eye contact. She discussed her current hospital stay and the emergency surgery she had yesterday. She appears to be recovering well and is in good spirits.  inquired how she could best support Magnolia during her hospital stay. Mary Claudia requested to have Father Sergio visit her and  confirmed her keron preference as Hindu.  will collaborate with Father Rafa Reeves to have Magnolia's needs met.  continued to be a supportive presence as Mary Taylor discussed other concerns and engaged in story telling. She thanked the  for stopping by.  will follow up as able and/or needed  [employer] Maged Cash.      Paging Service: [phone])

## 2019-08-12 NOTE — BRIEF OP NOTE
BRIEF OPERATIVE NOTE    Date of Procedure: 8/11/2019   Preoperative Diagnosis: CHOLECYSTITIS  Postoperative Diagnosis: CHOLECYSTITIS    Procedure(s):  CHOLECYSTECTOMY LAPAROSCOPIC  Surgeon(s) and Role:     Lakia Murray MD - Primary         Surgical Assistant: [facility]    Surgical Staff:  Circ-1: Anthony Chance RN  Scrub Tech-1: Saurav Marlow  Surg Asst-1: Jordan Rodriguez  Event Time In Time Out   Incision Start 2013    Incision Close       Anesthesia: General   Estimated Blood Loss: 60cc  Specimens:   ID Type Source Tests Collected by Time Destination   1 : gallbladder Preservative Gallbladder  Wilmar Fajardo MD 8/11/2019 2034 Pathology      Findings: Severe acute on chronic cholecystitis  Complications: none  Implants: 19 Fr round SHERIDAN drain

## 2019-08-13 ENCOUNTER — APPOINTMENT (OUTPATIENT)
Dept: CT IMAGING | Age: 59
DRG: 854 | End: 2019-08-13
Attending: PHYSICIAN ASSISTANT
Payer: COMMERCIAL

## 2019-08-13 PROBLEM — A41.9 SEPSIS (HCC): Status: ACTIVE | Noted: 2019-08-13

## 2019-08-13 PROBLEM — I10 HYPERTENSION: Status: ACTIVE | Noted: 2019-08-13

## 2019-08-13 PROBLEM — Z90.49 S/P CHOLECYSTECTOMY: Status: ACTIVE | Noted: 2019-08-13

## 2019-08-13 PROBLEM — R73.9 HYPERGLYCEMIA: Status: ACTIVE | Noted: 2019-08-13

## 2019-08-13 PROBLEM — R09.02 HYPOXIA: Status: ACTIVE | Noted: 2019-08-13

## 2019-08-13 PROBLEM — R00.0 SINUS TACHYCARDIA: Status: ACTIVE | Noted: 2019-08-13

## 2019-08-13 PROBLEM — D72.829 LEUKOCYTOSIS: Status: ACTIVE | Noted: 2019-08-13

## 2019-08-13 PROBLEM — R50.9 FEVER: Status: ACTIVE | Noted: 2019-08-13

## 2019-08-13 LAB
ALBUMIN SERPL-MCNC: 2.8 G/DL (ref 3.5–5)
ALBUMIN/GLOB SERPL: 0.7 {RATIO} (ref 1.1–2.2)
ALP SERPL-CCNC: 91 U/L (ref 45–117)
ALT SERPL-CCNC: 47 U/L (ref 12–78)
ANION GAP SERPL CALC-SCNC: 8 MMOL/L (ref 5–15)
ANION GAP SERPL CALC-SCNC: 8 MMOL/L (ref 5–15)
AST SERPL-CCNC: 32 U/L (ref 15–37)
ATRIAL RATE: 127 BPM
BILIRUB DIRECT SERPL-MCNC: 0.2 MG/DL (ref 0–0.2)
BILIRUB SERPL-MCNC: 0.8 MG/DL (ref 0.2–1)
BUN SERPL-MCNC: 7 MG/DL (ref 6–20)
BUN SERPL-MCNC: 8 MG/DL (ref 6–20)
BUN/CREAT SERPL: 10 (ref 12–20)
BUN/CREAT SERPL: 8 (ref 12–20)
CALCIUM SERPL-MCNC: 8.4 MG/DL (ref 8.5–10.1)
CALCIUM SERPL-MCNC: 8.5 MG/DL (ref 8.5–10.1)
CALCULATED P AXIS, ECG09: 44 DEGREES
CALCULATED R AXIS, ECG10: -20 DEGREES
CALCULATED T AXIS, ECG11: 11 DEGREES
CHLORIDE SERPL-SCNC: 96 MMOL/L (ref 97–108)
CHLORIDE SERPL-SCNC: 98 MMOL/L (ref 97–108)
CO2 SERPL-SCNC: 28 MMOL/L (ref 21–32)
CO2 SERPL-SCNC: 28 MMOL/L (ref 21–32)
CREAT SERPL-MCNC: 0.79 MG/DL (ref 0.55–1.02)
CREAT SERPL-MCNC: 0.84 MG/DL (ref 0.55–1.02)
DIAGNOSIS, 93000: NORMAL
ERYTHROCYTE [DISTWIDTH] IN BLOOD BY AUTOMATED COUNT: 12.4 % (ref 11.5–14.5)
ERYTHROCYTE [DISTWIDTH] IN BLOOD BY AUTOMATED COUNT: 12.6 % (ref 11.5–14.5)
GLOBULIN SER CALC-MCNC: 4.1 G/DL (ref 2–4)
GLUCOSE BLD STRIP.AUTO-MCNC: 268 MG/DL (ref 65–100)
GLUCOSE SERPL-MCNC: 251 MG/DL (ref 65–100)
GLUCOSE SERPL-MCNC: 307 MG/DL (ref 65–100)
HCT VFR BLD AUTO: 39.7 % (ref 35–47)
HCT VFR BLD AUTO: 40.1 % (ref 35–47)
HGB BLD-MCNC: 13.3 G/DL (ref 11.5–16)
HGB BLD-MCNC: 13.5 G/DL (ref 11.5–16)
LACTATE SERPL-SCNC: 1.4 MMOL/L (ref 0.4–2)
MCH RBC QN AUTO: 28.1 PG (ref 26–34)
MCH RBC QN AUTO: 28.5 PG (ref 26–34)
MCHC RBC AUTO-ENTMCNC: 33.5 G/DL (ref 30–36.5)
MCHC RBC AUTO-ENTMCNC: 33.7 G/DL (ref 30–36.5)
MCV RBC AUTO: 83.9 FL (ref 80–99)
MCV RBC AUTO: 84.6 FL (ref 80–99)
NRBC # BLD: 0 K/UL (ref 0–0.01)
NRBC # BLD: 0 K/UL (ref 0–0.01)
NRBC BLD-RTO: 0 PER 100 WBC
NRBC BLD-RTO: 0 PER 100 WBC
P-R INTERVAL, ECG05: 124 MS
PLATELET # BLD AUTO: 174 K/UL (ref 150–400)
PLATELET # BLD AUTO: 185 K/UL (ref 150–400)
PMV BLD AUTO: 10.6 FL (ref 8.9–12.9)
PMV BLD AUTO: 10.7 FL (ref 8.9–12.9)
POTASSIUM SERPL-SCNC: 3.4 MMOL/L (ref 3.5–5.1)
POTASSIUM SERPL-SCNC: 3.6 MMOL/L (ref 3.5–5.1)
PROT SERPL-MCNC: 6.9 G/DL (ref 6.4–8.2)
Q-T INTERVAL, ECG07: 320 MS
QRS DURATION, ECG06: 96 MS
QTC CALCULATION (BEZET), ECG08: 465 MS
RBC # BLD AUTO: 4.73 M/UL (ref 3.8–5.2)
RBC # BLD AUTO: 4.74 M/UL (ref 3.8–5.2)
SERVICE CMNT-IMP: ABNORMAL
SODIUM SERPL-SCNC: 132 MMOL/L (ref 136–145)
SODIUM SERPL-SCNC: 134 MMOL/L (ref 136–145)
TSH SERPL DL<=0.05 MIU/L-ACNC: 0.48 UIU/ML (ref 0.36–3.74)
VENTRICULAR RATE, ECG03: 127 BPM
WBC # BLD AUTO: 13 K/UL (ref 3.6–11)
WBC # BLD AUTO: 7.7 K/UL (ref 3.6–11)

## 2019-08-13 PROCEDURE — 74011250636 HC RX REV CODE- 250/636: Performed by: SURGERY

## 2019-08-13 PROCEDURE — 74011250637 HC RX REV CODE- 250/637: Performed by: PHYSICIAN ASSISTANT

## 2019-08-13 PROCEDURE — 85027 COMPLETE CBC AUTOMATED: CPT

## 2019-08-13 PROCEDURE — 74011250636 HC RX REV CODE- 250/636: Performed by: INTERNAL MEDICINE

## 2019-08-13 PROCEDURE — 74011636637 HC RX REV CODE- 636/637: Performed by: INTERNAL MEDICINE

## 2019-08-13 PROCEDURE — 74011250636 HC RX REV CODE- 250/636: Performed by: PHYSICIAN ASSISTANT

## 2019-08-13 PROCEDURE — 65270000029 HC RM PRIVATE

## 2019-08-13 PROCEDURE — 36415 COLL VENOUS BLD VENIPUNCTURE: CPT

## 2019-08-13 PROCEDURE — 94760 N-INVAS EAR/PLS OXIMETRY 1: CPT

## 2019-08-13 PROCEDURE — 71275 CT ANGIOGRAPHY CHEST: CPT

## 2019-08-13 PROCEDURE — 74011636320 HC RX REV CODE- 636/320: Performed by: STUDENT IN AN ORGANIZED HEALTH CARE EDUCATION/TRAINING PROGRAM

## 2019-08-13 PROCEDURE — 93005 ELECTROCARDIOGRAM TRACING: CPT

## 2019-08-13 PROCEDURE — 74011250637 HC RX REV CODE- 250/637: Performed by: SURGERY

## 2019-08-13 PROCEDURE — 99218 HC RM OBSERVATION: CPT

## 2019-08-13 PROCEDURE — 84443 ASSAY THYROID STIM HORMONE: CPT

## 2019-08-13 PROCEDURE — 74177 CT ABD & PELVIS W/CONTRAST: CPT

## 2019-08-13 PROCEDURE — 80076 HEPATIC FUNCTION PANEL: CPT

## 2019-08-13 PROCEDURE — 83605 ASSAY OF LACTIC ACID: CPT

## 2019-08-13 PROCEDURE — 87040 BLOOD CULTURE FOR BACTERIA: CPT

## 2019-08-13 PROCEDURE — 80048 BASIC METABOLIC PNL TOTAL CA: CPT

## 2019-08-13 PROCEDURE — 83036 HEMOGLOBIN GLYCOSYLATED A1C: CPT

## 2019-08-13 PROCEDURE — 74011000258 HC RX REV CODE- 258: Performed by: SURGERY

## 2019-08-13 PROCEDURE — 82962 GLUCOSE BLOOD TEST: CPT

## 2019-08-13 RX ORDER — ACETAMINOPHEN 325 MG/1
650 TABLET ORAL
Status: DISCONTINUED | OUTPATIENT
Start: 2019-08-13 | End: 2019-08-15 | Stop reason: HOSPADM

## 2019-08-13 RX ORDER — ENOXAPARIN SODIUM 100 MG/ML
40 INJECTION SUBCUTANEOUS EVERY 24 HOURS
Status: DISCONTINUED | OUTPATIENT
Start: 2019-08-13 | End: 2019-08-15 | Stop reason: HOSPADM

## 2019-08-13 RX ORDER — HYDRALAZINE HYDROCHLORIDE 20 MG/ML
20 INJECTION INTRAMUSCULAR; INTRAVENOUS
Status: DISCONTINUED | OUTPATIENT
Start: 2019-08-13 | End: 2019-08-15 | Stop reason: HOSPADM

## 2019-08-13 RX ORDER — DEXTROSE, SODIUM CHLORIDE, AND POTASSIUM CHLORIDE 5; .45; .15 G/100ML; G/100ML; G/100ML
75 INJECTION INTRAVENOUS CONTINUOUS
Status: DISCONTINUED | OUTPATIENT
Start: 2019-08-13 | End: 2019-08-13

## 2019-08-13 RX ORDER — DOCUSATE SODIUM 100 MG/1
100 CAPSULE, LIQUID FILLED ORAL 2 TIMES DAILY
Status: DISCONTINUED | OUTPATIENT
Start: 2019-08-13 | End: 2019-08-15 | Stop reason: HOSPADM

## 2019-08-13 RX ORDER — ENOXAPARIN SODIUM 100 MG/ML
40 INJECTION SUBCUTANEOUS EVERY 24 HOURS
Status: DISCONTINUED | OUTPATIENT
Start: 2019-08-14 | End: 2019-08-13

## 2019-08-13 RX ORDER — SODIUM CHLORIDE 9 MG/ML
75 INJECTION, SOLUTION INTRAVENOUS CONTINUOUS
Status: DISCONTINUED | OUTPATIENT
Start: 2019-08-13 | End: 2019-08-15

## 2019-08-13 RX ORDER — DEXTROSE MONOHYDRATE 100 MG/ML
125-250 INJECTION, SOLUTION INTRAVENOUS AS NEEDED
Status: DISCONTINUED | OUTPATIENT
Start: 2019-08-13 | End: 2019-08-15 | Stop reason: HOSPADM

## 2019-08-13 RX ORDER — MAGNESIUM SULFATE 100 %
4 CRYSTALS MISCELLANEOUS AS NEEDED
Status: DISCONTINUED | OUTPATIENT
Start: 2019-08-13 | End: 2019-08-15 | Stop reason: HOSPADM

## 2019-08-13 RX ORDER — INSULIN LISPRO 100 [IU]/ML
INJECTION, SOLUTION INTRAVENOUS; SUBCUTANEOUS
Status: DISCONTINUED | OUTPATIENT
Start: 2019-08-13 | End: 2019-08-15 | Stop reason: HOSPADM

## 2019-08-13 RX ORDER — FACIAL-BODY WIPES
10 EACH TOPICAL DAILY
Status: DISCONTINUED | OUTPATIENT
Start: 2019-08-13 | End: 2019-08-15 | Stop reason: HOSPADM

## 2019-08-13 RX ADMIN — HYDRALAZINE HYDROCHLORIDE 20 MG: 20 INJECTION INTRAMUSCULAR; INTRAVENOUS at 21:06

## 2019-08-13 RX ADMIN — PIPERACILLIN SODIUM,TAZOBACTAM SODIUM 3.38 G: 3; .375 INJECTION, POWDER, FOR SOLUTION INTRAVENOUS at 01:53

## 2019-08-13 RX ADMIN — DEXTROSE MONOHYDRATE, SODIUM CHLORIDE, AND POTASSIUM CHLORIDE 75 ML/HR: 50; 4.5; 1.49 INJECTION, SOLUTION INTRAVENOUS at 18:55

## 2019-08-13 RX ADMIN — BISACODYL 10 MG: 10 SUPPOSITORY RECTAL at 11:41

## 2019-08-13 RX ADMIN — PIPERACILLIN SODIUM,TAZOBACTAM SODIUM 3.38 G: 3; .375 INJECTION, POWDER, FOR SOLUTION INTRAVENOUS at 18:57

## 2019-08-13 RX ADMIN — PIPERACILLIN SODIUM,TAZOBACTAM SODIUM 3.38 G: 3; .375 INJECTION, POWDER, FOR SOLUTION INTRAVENOUS at 09:48

## 2019-08-13 RX ADMIN — INSULIN LISPRO 2 UNITS: 100 INJECTION, SOLUTION INTRAVENOUS; SUBCUTANEOUS at 22:26

## 2019-08-13 RX ADMIN — ENOXAPARIN SODIUM 40 MG: 40 INJECTION SUBCUTANEOUS at 11:41

## 2019-08-13 RX ADMIN — DOCUSATE SODIUM 100 MG: 100 CAPSULE ORAL at 18:57

## 2019-08-13 RX ADMIN — SODIUM CHLORIDE 125 ML/HR: 900 INJECTION, SOLUTION INTRAVENOUS at 21:05

## 2019-08-13 RX ADMIN — IOPAMIDOL 100 ML: 755 INJECTION, SOLUTION INTRAVENOUS at 18:03

## 2019-08-13 RX ADMIN — DOCUSATE SODIUM 100 MG: 100 CAPSULE ORAL at 11:41

## 2019-08-13 RX ADMIN — ACETAMINOPHEN 650 MG: 325 TABLET ORAL at 05:35

## 2019-08-13 NOTE — PROGRESS NOTES
2256- Paged the on call provider regarding the patients BP and her elevated HR.     0333- Paged the on call provider reagarding the patients BP and her elevated HR.    0339- received telephone orders with read back: Dr. Chyna Hackett- STAT EKG, CBC, BMP. No meds ordered at this time. 0360- Paged On call provider Dr. Ceci Bello regarding the patients current symptoms. 6109- Received telephone order with read back from Dr. Ceci Bello: Tylenol 650 mg Q4hr PRN.

## 2019-08-13 NOTE — PROGRESS NOTES
Problem: Falls - Risk of  Goal: *Absence of Falls  Description  Document Snow Sanchez Fall Risk and appropriate interventions in the flowsheet.   Outcome: Progressing Towards Goal  Note:   Fall Risk Interventions:            Medication Interventions: Patient to call before getting OOB                   Problem: Pain  Goal: *Control of Pain  Outcome: Progressing Towards Goal

## 2019-08-13 NOTE — PROGRESS NOTES
General Surgery Daily Progress Note    Patient: Yobany Ayala MRN: [id number]  SSN: [id number]    YOB: 1960  Age: 61 y.o. Sex: female      Admit Date: 8/11/2019    Subjective:   Poor appetite but no N/V. Pain controlled. + flatus. Febrile and tachycardic overnight. Current Facility-Administered Medications   Medication Dose Route Frequency    acetaminophen (TYLENOL) tablet 650 mg  650 mg Oral Q4H PRN    ondansetron (ZOFRAN) injection 4 mg  4 mg IntraVENous Q6H PRN    morphine injection 2 mg  2 mg IntraVENous Q2H PRN    oxyCODONE-acetaminophen (PERCOCET) 5-325 mg per tablet 1-2 Tab  1-2 Tab Oral Q4H PRN    piperacillin-tazobactam (ZOSYN) 3.375 g in 0.9% sodium chloride (MBP/ADV) 100 mL  3.375 g IntraVENous Q8H        Objective:   No intake/output data recorded. 08/11 1901 - 08/13 0700  In: 2140 [P.O.:240; I.V.:1900]  Out: 190 [Drains:140]  Patient Vitals for the past 8 hrs:   BP Temp Pulse Resp SpO2   08/13/19 0949  99 °F (37.2 °C)      08/13/19 0641 167/80 99.5 °F (37.5 °C) (!) 120 18 96 %   08/13/19 0620 165/83 99.4 °F (37.4 °C) (!) 118 18 93 %   08/13/19 0505 (!) 182/95 (!) 102.1 °F (38.9 °C) (!) 120 18    08/13/19 0358 (!) 189/95 (!) 101.5 °F (38.6 °C) (!) 123 18 93 %   08/13/19 0241 (!) 178/104  (!) 130         Physical Exam:  General: Alert, cooperative, NAD  Lungs: Unlabored  Heart:  Tachycardic   Abdomen: Soft, ATTP, mildly distended. + bowel sounds. Incisions c/d/i. SHERIDAN drain SS. Extremities: Warm, moves all, no edema  Skin:  Warm and dry, no rash    Labs:   Recent Labs     08/13/19  0408   WBC 13.0*   HGB 13.5   HCT 40.1        Recent Labs     08/13/19  0408   *   K 3.6   CL 98   CO2 28   *   BUN 8   CREA 0.79   CA 8.5   ALB 2.8*   TBILI 0.8   SGOT 32   ALT 47       Assessment / Plan:   · POD#2 lap evy  · Abdominal exam appropriate  · Fever and tachycardia but clinically well appearing. Will remain in the hospital for ongoing antibiotics and monitoring. Low threshold to repeat imaging if condition declines or no improvement.    · Diet as tolerated  · Mobilize  · Keep drain   · Bowel regimen  · Lovenox for DVT prophylaxis

## 2019-08-13 NOTE — PROGRESS NOTES
Rounded on Alevism patients and provided Communion and  Anointing of the Sick at request of patient    Fr. Cristina Blue

## 2019-08-13 NOTE — PROGRESS NOTES
[address] visit. Mrs. Taveras received Access Point of Mancini's and communion today. She is hoping to go home soon and had no further spiritual needs today. Prayer offered.     NATAN Casanova, RN, ACSW, LCSW   Page:  [phone]

## 2019-08-13 NOTE — PROGRESS NOTES
Notified by Jasmin Coronel for order of CTA stat. 1925  Notified Jasmin Coronel of Patient Temp 103, and elevated BP. Instruction to call On call surgeon and notify of status so he can manage through the night. 19:30 called a code Sepsis    19:40  Notified On-call Dr. Alyssa Luo to inform of patient /95, , Temp 103, POX; 89-91%, 2L O2 via NC administered. Dr Jay Durand ordered to consult Hospitalist to mange tachycardia, and HTN. Order for TSH lab along with blood cultures. Nurse did inform MD a code Sepsis was called on patient.

## 2019-08-13 NOTE — PROGRESS NOTES
Notified Jasmin Coronel of Patient elevated /81 and continued -115. No new orders given. Discontinue Discharge order.

## 2019-08-14 PROBLEM — K81.0 ACUTE CHOLECYSTITIS: Status: ACTIVE | Noted: 2019-08-14

## 2019-08-14 LAB
ALBUMIN SERPL-MCNC: 2.5 G/DL (ref 3.5–5)
ALBUMIN/GLOB SERPL: 0.6 {RATIO} (ref 1.1–2.2)
ALP SERPL-CCNC: 80 U/L (ref 45–117)
ALT SERPL-CCNC: 35 U/L (ref 12–78)
ANION GAP SERPL CALC-SCNC: 7 MMOL/L (ref 5–15)
AST SERPL-CCNC: 19 U/L (ref 15–37)
BILIRUB SERPL-MCNC: 0.7 MG/DL (ref 0.2–1)
BUN SERPL-MCNC: 8 MG/DL (ref 6–20)
BUN/CREAT SERPL: 10 (ref 12–20)
CALCIUM SERPL-MCNC: 8.3 MG/DL (ref 8.5–10.1)
CHLORIDE SERPL-SCNC: 101 MMOL/L (ref 97–108)
CO2 SERPL-SCNC: 29 MMOL/L (ref 21–32)
CREAT SERPL-MCNC: 0.79 MG/DL (ref 0.55–1.02)
ERYTHROCYTE [DISTWIDTH] IN BLOOD BY AUTOMATED COUNT: 13 % (ref 11.5–14.5)
EST. AVERAGE GLUCOSE BLD GHB EST-MCNC: 186 MG/DL
GLOBULIN SER CALC-MCNC: 3.9 G/DL (ref 2–4)
GLUCOSE BLD STRIP.AUTO-MCNC: 159 MG/DL (ref 65–100)
GLUCOSE BLD STRIP.AUTO-MCNC: 169 MG/DL (ref 65–100)
GLUCOSE BLD STRIP.AUTO-MCNC: 228 MG/DL (ref 65–100)
GLUCOSE BLD STRIP.AUTO-MCNC: 288 MG/DL (ref 65–100)
GLUCOSE SERPL-MCNC: 224 MG/DL (ref 65–100)
HBA1C MFR BLD: 8.1 % (ref 4.2–6.3)
HCT VFR BLD AUTO: 38.1 % (ref 35–47)
HGB BLD-MCNC: 12.9 G/DL (ref 11.5–16)
MCH RBC QN AUTO: 28.8 PG (ref 26–34)
MCHC RBC AUTO-ENTMCNC: 33.9 G/DL (ref 30–36.5)
MCV RBC AUTO: 85 FL (ref 80–99)
NRBC # BLD: 0 K/UL (ref 0–0.01)
NRBC BLD-RTO: 0 PER 100 WBC
PLATELET # BLD AUTO: 162 K/UL (ref 150–400)
PMV BLD AUTO: 10.7 FL (ref 8.9–12.9)
POTASSIUM SERPL-SCNC: 3 MMOL/L (ref 3.5–5.1)
PROT SERPL-MCNC: 6.4 G/DL (ref 6.4–8.2)
RBC # BLD AUTO: 4.48 M/UL (ref 3.8–5.2)
SERVICE CMNT-IMP: ABNORMAL
SODIUM SERPL-SCNC: 137 MMOL/L (ref 136–145)
WBC # BLD AUTO: 6.6 K/UL (ref 3.6–11)

## 2019-08-14 PROCEDURE — 74011250636 HC RX REV CODE- 250/636: Performed by: SURGERY

## 2019-08-14 PROCEDURE — 94760 N-INVAS EAR/PLS OXIMETRY 1: CPT

## 2019-08-14 PROCEDURE — 65270000029 HC RM PRIVATE

## 2019-08-14 PROCEDURE — 74011636637 HC RX REV CODE- 636/637: Performed by: INTERNAL MEDICINE

## 2019-08-14 PROCEDURE — 74011250637 HC RX REV CODE- 250/637: Performed by: PHYSICIAN ASSISTANT

## 2019-08-14 PROCEDURE — 74011250636 HC RX REV CODE- 250/636: Performed by: INTERNAL MEDICINE

## 2019-08-14 PROCEDURE — 74011000258 HC RX REV CODE- 258: Performed by: SURGERY

## 2019-08-14 PROCEDURE — 36415 COLL VENOUS BLD VENIPUNCTURE: CPT

## 2019-08-14 PROCEDURE — 85027 COMPLETE CBC AUTOMATED: CPT

## 2019-08-14 PROCEDURE — 80053 COMPREHEN METABOLIC PANEL: CPT

## 2019-08-14 PROCEDURE — 74011250636 HC RX REV CODE- 250/636: Performed by: PHYSICIAN ASSISTANT

## 2019-08-14 PROCEDURE — 99218 HC RM OBSERVATION: CPT

## 2019-08-14 PROCEDURE — 82962 GLUCOSE BLOOD TEST: CPT

## 2019-08-14 RX ORDER — INSULIN GLARGINE 100 [IU]/ML
8 INJECTION, SOLUTION SUBCUTANEOUS
Status: DISCONTINUED | OUTPATIENT
Start: 2019-08-14 | End: 2019-08-15 | Stop reason: HOSPADM

## 2019-08-14 RX ORDER — POTASSIUM CHLORIDE 750 MG/1
40 TABLET, FILM COATED, EXTENDED RELEASE ORAL
Status: COMPLETED | OUTPATIENT
Start: 2019-08-14 | End: 2019-08-14

## 2019-08-14 RX ADMIN — INSULIN GLARGINE 8 UNITS: 100 INJECTION, SOLUTION SUBCUTANEOUS at 22:05

## 2019-08-14 RX ADMIN — HYDRALAZINE HYDROCHLORIDE 20 MG: 20 INJECTION INTRAMUSCULAR; INTRAVENOUS at 04:03

## 2019-08-14 RX ADMIN — POTASSIUM CHLORIDE 40 MEQ: 750 TABLET, EXTENDED RELEASE ORAL at 09:39

## 2019-08-14 RX ADMIN — INSULIN LISPRO 2 UNITS: 100 INJECTION, SOLUTION INTRAVENOUS; SUBCUTANEOUS at 07:40

## 2019-08-14 RX ADMIN — ENOXAPARIN SODIUM 40 MG: 40 INJECTION SUBCUTANEOUS at 11:53

## 2019-08-14 RX ADMIN — PIPERACILLIN SODIUM,TAZOBACTAM SODIUM 3.38 G: 3; .375 INJECTION, POWDER, FOR SOLUTION INTRAVENOUS at 01:04

## 2019-08-14 RX ADMIN — DOCUSATE SODIUM 100 MG: 100 CAPSULE ORAL at 09:39

## 2019-08-14 RX ADMIN — PIPERACILLIN SODIUM,TAZOBACTAM SODIUM 3.38 G: 3; .375 INJECTION, POWDER, FOR SOLUTION INTRAVENOUS at 17:57

## 2019-08-14 RX ADMIN — PIPERACILLIN SODIUM,TAZOBACTAM SODIUM 3.38 G: 3; .375 INJECTION, POWDER, FOR SOLUTION INTRAVENOUS at 09:39

## 2019-08-14 RX ADMIN — INSULIN LISPRO 3 UNITS: 100 INJECTION, SOLUTION INTRAVENOUS; SUBCUTANEOUS at 11:53

## 2019-08-14 RX ADMIN — DOCUSATE SODIUM 100 MG: 100 CAPSULE ORAL at 17:57

## 2019-08-14 NOTE — PROGRESS NOTES
General Surgery Daily Progress Note    Patient: Zina Willis MRN: [id number]  SSN: [id number]    YOB: 1960  Age: 61 y.o. Sex: female      Admit Date: 8/11/2019    Subjective:   Feeling well, mild pain, no N/V. Tolerating diet. Current Facility-Administered Medications   Medication Dose Route Frequency    acetaminophen (TYLENOL) tablet 650 mg  650 mg Oral Q4H PRN    docusate sodium (COLACE) capsule 100 mg  100 mg Oral BID    bisacodyl (DULCOLAX) suppository 10 mg  10 mg Rectal DAILY    enoxaparin (LOVENOX) injection 40 mg  40 mg SubCUTAneous Q24H    0.9% sodium chloride infusion  125 mL/hr IntraVENous CONTINUOUS    hydrALAZINE (APRESOLINE) 20 mg/mL injection 20 mg  20 mg IntraVENous Q6H PRN    glucose chewable tablet 16 g  4 Tab Oral PRN    glucagon (GLUCAGEN) injection 1 mg  1 mg IntraMUSCular PRN    dextrose 10% infusion 125-250 mL  125-250 mL IntraVENous PRN    insulin lispro (HUMALOG) injection   SubCUTAneous AC&HS    ondansetron (ZOFRAN) injection 4 mg  4 mg IntraVENous Q6H PRN    morphine injection 2 mg  2 mg IntraVENous Q2H PRN    oxyCODONE-acetaminophen (PERCOCET) 5-325 mg per tablet 1-2 Tab  1-2 Tab Oral Q4H PRN    piperacillin-tazobactam (ZOSYN) 3.375 g in 0.9% sodium chloride (MBP/ADV) 100 mL  3.375 g IntraVENous Q8H        Objective:   08/14 0701 - 08/14 1900  In: 1520.8 [I.V.:1520.8]  Out: 400 [Urine:400]  08/12 1901 - 08/14 0700  In: 100 [I.V.:100]  Out: 60 [Drains:60]  Patient Vitals for the past 8 hrs:   BP Temp Pulse Resp SpO2   08/14/19 0830 (!) 158/96 99 °F (37.2 °C) (!) 13 16 95 %   08/14/19 0340 150/90 98.6 °F (37 °C) (!) 102 16 95 %       Physical Exam:  General: Alert, cooperative, NAD  Lungs: Unlabored  Heart:  Tachycardic   Abdomen: Soft, ATTP, mildly distended. + bowel sounds. Incisions c/d/i. SHERIDAN drain SS.    Extremities: Warm, moves all, no edema  Skin:  Warm and dry, no rash    Labs:   Recent Labs     08/14/19  0208   WBC 6.6   HGB 12.9   HCT 38.1    Recent Labs     08/14/19  0208      K 3.0*      CO2 29   *   BUN 8   CREA 0.79   CA 8.3*   ALB 2.5*   TBILI 0.7   SGOT 19   ALT 35       Assessment / Plan:   · POD#3 lap evy  · Abdominal exam appropriate  · SIRS/sepsis: appreciate hospitalist eval. CT negative for PE, abdominal CT findings expected post-op. She is well appearing and tachycardia improving. No evidence of bile leak. Cultures pending.  Continue ABX, decrease IVF  · Diet as tolerated  · Mobilize  · Keep drain   · Bowel regimen  · Lovenox for DVT prophylaxis

## 2019-08-14 NOTE — PROGRESS NOTES
Marcelo Wu [facility]  [address]  [phone]      Medical Progress Note      NAME: Harish Callahan   :  1960  MRM:  [id number]    Date/Time: 2019  3:28 PM         Subjective:     Chief Complaint:  F/u fever    ROS:  (bold if positive, if negative)      Tolerating PT  Tolerating Diet          Objective:       Vitals:          Last 24hrs VS reviewed since prior progress note.  Most recent are:    Visit Vitals  /72 (BP Patient Position: Head of bed elevated (Comment degrees))   Pulse (!) 107   Temp 98.3 °F (36.8 °C)   Resp 18   Ht 5' 2\" (1.575 m)   Wt 83.9 kg (185 lb)   SpO2 95%   BMI 33.84 kg/m²     SpO2 Readings from Last 6 Encounters:   19 95%    O2 Flow Rate (L/min): 2 l/min       Intake/Output Summary (Last 24 hours) at 2019 1528  Last data filed at 2019 8482  Gross per 24 hour   Intake 1520.83 ml   Output 400 ml   Net 1120.83 ml          Exam:     Physical Exam:    Gen:  Well-developed, well-nourished, in no acute distress  HEENT:  Pink conjunctivae, PERRL, hearing intact to voice, moist mucous membranes  Neck:  Supple, without masses, thyroid non-tender  Resp:  No accessory muscle use, clear breath sounds without wheezes rales or rhonchi  Card:  No murmurs, normal S1, S2 without thrills, bruits or peripheral edema  Abd:  Soft, non-tender, non-distended, normoactive bowel sounds are present  Musc:  No cyanosis or clubbing  Skin:  No rashes or ulcers, skin turgor is good  Neuro:  Cranial nerves 3-12 are grossly intact,  strength is 5/5 bilaterally and dorsi / plantarflexion is 5/5 bilaterally, follows commands appropriately  Psych:  Good insight, oriented to person, place and time, alert            Medications Reviewed: (see below)    Lab Data Reviewed: (see below)    ______________________________________________________________________    Medications:     Current Facility-Administered Medications   Medication Dose Route Frequency    acetaminophen (TYLENOL) tablet 650 mg  650 mg Oral Q4H PRN    docusate sodium (COLACE) capsule 100 mg  100 mg Oral BID    bisacodyl (DULCOLAX) suppository 10 mg  10 mg Rectal DAILY    enoxaparin (LOVENOX) injection 40 mg  40 mg SubCUTAneous Q24H    0.9% sodium chloride infusion  75 mL/hr IntraVENous CONTINUOUS    hydrALAZINE (APRESOLINE) 20 mg/mL injection 20 mg  20 mg IntraVENous Q6H PRN    glucose chewable tablet 16 g  4 Tab Oral PRN    glucagon (GLUCAGEN) injection 1 mg  1 mg IntraMUSCular PRN    dextrose 10% infusion 125-250 mL  125-250 mL IntraVENous PRN    insulin lispro (HUMALOG) injection   SubCUTAneous AC&HS    ondansetron (ZOFRAN) injection 4 mg  4 mg IntraVENous Q6H PRN    morphine injection 2 mg  2 mg IntraVENous Q2H PRN    oxyCODONE-acetaminophen (PERCOCET) 5-325 mg per tablet 1-2 Tab  1-2 Tab Oral Q4H PRN    piperacillin-tazobactam (ZOSYN) 3.375 g in 0.9% sodium chloride (MBP/ADV) 100 mL  3.375 g IntraVENous Q8H            Lab Review:     Recent Labs     08/14/19  0208 08/13/19 1951 08/13/19  0408   WBC 6.6 7.7 13.0*   HGB 12.9 13.3 13.5   HCT 38.1 39.7 40.1    174 185     Recent Labs     08/14/19  0208 08/13/19 1951 08/13/19  0408 08/11/19  1627    132* 134* 137   K 3.0* 3.4* 3.6 3.6    96* 98 99   CO2 29 28 28 30   * 307* 251* 196*   BUN 8 7 8 10   CREA 0.79 0.84 0.79 0.91   CA 8.3* 8.4* 8.5 9.2   ALB 2.5*  --  2.8* 3.6   SGOT 19  --  32 35   ALT 35  --  47 47     No components found for: GLPOC         Assessment / Plan:       Acute cholecystitis due to biliary calculus / S/P cholecystectomy: POA. POD#2. Gen surgery is attending of record and will manage pain, diet, and disposition as usual.     Sepsis / Fever / Leukocytosis / Sinus tachycardia. Suspect this is related to cholecystitis. CTA C/A/P reviewed, neg for PE, abd with gas and fluid, likely related to procedure. Agree with zosyn.  Check urine, bcx, lactic acid.       Hypertension (8/13/2019). Does not appear to be a chronic diagnosis. Add PRN IV hydralazine     Hypoxia: now improving. Likely related to sepsis, pain. Supplemental oxygen as needed. Add IS. Control pain.     Type 2 diabetes mellitus: A1c 8.1. Will start lantus while hospitalized for improved glucose control. Can likely dc on metformin. Cont SSI.  Consult for diabetic teaching    Hypokalemia: replete prn      Total time spent with patient: 30 [address] discussed with: Patient    Discussed:  Care Plan               ___________________________________________________    Attending Physician: Zachery Padilla MD

## 2019-08-14 NOTE — PROGRESS NOTES
Rounded on [facility] patients and provided Communion and Anointing of the Sick at request of patient    Fr. Rosalio Sun

## 2019-08-14 NOTE — PROGRESS NOTES
Problem: Lap Adrianna: 12 Hours through Discharge  Goal: Activity/Safety  Outcome: Progressing Towards Goal  Goal: Nutrition/Diet  Outcome: Progressing Towards Goal  Goal: Medications  Outcome: Progressing Towards Goal  Goal: Respiratory  Outcome: Progressing Towards Goal  Goal: Treatments/Interventions/Procedures  Outcome: Progressing Towards Goal  Goal: Psychosocial  Outcome: Progressing Towards Goal     Problem: Lap Adrianna: Discharge Outcomes  Goal: *Demonstrates progressive activity  Outcome: Progressing Towards Goal  Goal: *Tolerating diet  Outcome: Progressing Towards Goal  Goal: *Hemodynamically stable  Outcome: Progressing Towards Goal  Goal: *Describes available resources and support systems  Outcome: Progressing Towards Goal  Goal: *Optimal pain control at patient's stated goal  Outcome: Progressing Towards Goal  Goal: *Verbalizes understanding and describes medication purposes and frequencies  Outcome: Progressing Towards Goal  Goal: *Lungs clear or at baseline  Outcome: Progressing Towards Goal  Goal: *Verbalizes and demonstrates incision care  Outcome: Progressing Towards Goal     Problem: Falls - Risk of  Goal: *Absence of Falls  Description  Document [facility] Fall Risk and appropriate interventions in the flowsheet.   Outcome: Progressing Towards Goal  Note:   Fall Risk Interventions:            Medication Interventions: Evaluate medications/consider consulting pharmacy, Patient to call before getting OOB, Teach patient to arise slowly, Assess postural VS orthostatic hypotension                   Problem: Patient Education: Go to Patient Education Activity  Goal: Patient/Family Education  Outcome: Progressing Towards Goal     Problem: Pain  Goal: *Control of Pain  Outcome: Progressing Towards Goal     Problem: Infection - Risk of, Surgical Site Infection  Goal: *Absence of surgical site infection signs and symptoms  Outcome: Progressing Towards Goal

## 2019-08-14 NOTE — PROGRESS NOTES
8/14/2019 3:40 PM EMR reviewed. Planning for pt to discharge home with family assistance and outpatient follow up when medically stable. CM will follow.  USHA aPscual

## 2019-08-14 NOTE — CDMP QUERY
Pt admitted on 8/11 under observation status for acute cholecystitis and underwent a lap ccx. Pt admitted to inpatient status on 8/13 with sepsis. If possible, please document in the progress notes and d/c summary if sepsis was present on arrival or developed after admission. 
 
=>  Sepsis POA 2/2 acute cholecystitis AEB tachycardia, elevated neutrophils and pus found in the gallbladder  
=> Sepsis 2/2 acute cholecystitis developed after admission and s/p lap ccx  
=> Other explanation  
=> Clinically unable to determine The medical record reflects the following: 
   Risk Factors: 62 yo F admitted to observation with acute cholecystitis and underwent lap ccx Clinical Indicators: 8/11 Pulse 122 neuts 83%  Op report states \"The gallbladder was completely full of pus\"   8/13 PN Sepsis / Fever / Leukocytosis / Sinus tachycardia. Suspect this is related to cholecystitis\" Treatment: IV Zosyn Thank you for your time [facility] RN/BSN, CCDS Desk:   [phone] Other:  [phone]

## 2019-08-14 NOTE — ROUTINE PROCESS
Bedside shift change report given to [facility] (oncoming nurse) by Lolly Councilman, RN (offgoing nurse). Report included the following information SBAR, Kardex and MAR.

## 2019-08-14 NOTE — DIABETES MGMT
[facility]    [facility] Consult Note    Recommendations/ Comments:   Patient receptive to assessment and education. Patient had several questions related to nutrition, which were discussed during visit. Reviewed diagnosis of T2DM based on A1c, discussed eAG and goal. Explained pathophysiology of diabetes. Patient reports history of skipping meals, often skipping breakfast and sometimes lunch. Patient reports eating dinner late in the evening. Patient states she drinks unsweetened tea, black coffee and Coke Zero/Sprite Zero. Patient does have 1/2 glass of orange juice once weekly with breakfast her  makes. Patient enjoys prepackaged salads, fresh fruit, and fruit bars. She has occasional bagels, donuts, and PB pretzels. Educated patient on Balanced Plate meal planning, explaining portion control of carbohydrates for BG management. Educated on portions of protein and fats for weight management for BG control. Discussed MOA for planned discharge medications. Patient aware to establish with PCP for further medical management and for referral to DTC for outpatient education. Chart reviewed noting addition of Lantus to current hospital regimen, starting tonight. Patient to be discharged on Metformin for initial management. Will return tomorrow for meter teaching. If appropriate, please consider: Would recommend discharging patient on Metformin first starting daily at dinner before increasing to twice daily to assess for GI side effects.     Current hospital DM medication:   Lispro high sensitivity correction scale  Lantus 8 units nightly, to start tonight    Consult received for:  []             Assessment of home management                []      Medication Recommendations                []             Meter/monitoring     []             Insulin instruction     [x]             New diagnosis     []             Outpatient education     []             Insulin pump patient     [] Insulin infusion     []             DKA/HHS    Chart reviewed and initial evaluation complete on Magdiel Dasilva. Patient is a 61 y.o. female with new diagnosis Type 2 Diabetes. Assessed and instructed patient on the following:   ·  interpretation of lab results: A1c with eAG and goal  · blood sugar goals: prandial and postprandial targets  · hypoglycemia prevention and treatment: symptoms, causes, 15:15 rule  · exercise  · Nutrition: see above  · referred to Diabetes Educator: patient establishing with PCP, to request referral for outpatient teaching at DTC    Encouraged the following:   · dietary modifications: Balanced Plate meal planning, eat 3 meals daily  · regular blood sugar monitoring: daily, rotating times    Provided patient with the following: [x]             Diabetes Self Care Guide               []             Insulin education materials               []             CHO counting education materials               [x]             Outpatient DTC contact number               []             Glucometer                 Discussed with patient and/or family need for follow up appointment for diabetes management after discharge. A1c:   Lab Results   Component Value Date/Time    Hemoglobin A1c 8.1 (H) 08/13/2019 08:12 PM       Recent Glucose Results:   Lab Results   Component Value Date/Time     (H) 08/14/2019 02:08 AM     (H) 08/13/2019 07:51 PM    GLUCPOC 288 (H) 08/14/2019 11:47 AM    GLUCPOC 228 (H) 08/14/2019 06:59 AM    GLUCPOC 268 (H) 08/13/2019 09:29 PM        Lab Results   Component Value Date/Time    Creatinine 0.79 08/14/2019 02:08 AM     Estimated Creatinine Clearance: 77 mL/min (based on SCr of 0.79 mg/dL). Active Orders   Diet    DIET REGULAR        PO intake:   Patient Vitals for the past 72 hrs:   % Diet Eaten   08/12/19 0916 5 %       Will continue to follow as needed. Thank you.   Junior Baker, RN  Office [phone]  Pager [phone]        Time spent: 30 min

## 2019-08-14 NOTE — CONSULTS
[address] [address]  [phone]    Hospitalist Consult Note      NAME:  Regina Overton   :   1960   MRN:  [id number]     Requesting Physician Tatiana Renteria MD   Reason for Consult:  HTN, TACHYCARDIA, SEPSIS     PCP:  Purnima Tilley MD     Date/Time:  2019 8:20 PM       Assessment and Plan:      Principal Problem:    Acute cholecystitis due to biliary calculus / S/P cholecystectomy. POA. POD#2. Gen surgery is attending of record and will manage pain, diet, and disposition as usual.    Active Problems:     Sepsis / Fever / Leukocytosis / Sinus tachycardia. Suspect this is related to cholecystitis. CTA C/A/P reviewed, neg for PE, abd with gas and fluid, likely related to procedure. Agree with zosyn. Check urine, bcx, lactic acid. Hypertensive so will avoid 30 mL/kg IVFs but will switch to isotonic fluids. Hypertension (2019). Does not appear to be a chronic diagnosis. Add PRN IV hydralazine    Hypoxia (2019). Likely related to sepsis, pain. Supplemental oxygen as needed. Add IS. Control pain. Hyperglycemia (2019). Check A1c. Start SSI per protocol            Subjective:     CHIEF COMPLAINT: Fever     HISTORY OF PRESENT ILLNESS:     Ms. Lilian Decker is a 61 y.o.  female who is admitted to the General Surgery Service with acute cholecystitis s/p cholecystectomy. We are asked to evaluate for elevated blood pressure, sinus tachycardia, and fever. Patient is resting comfortably in bed, reports passing flatus and BMs. Past med hx:  - Obesity    Past surg hx:  - Lap choley        Social History     Tobacco Use    Smoking status: Never Smoker    Smokeless tobacco: Never Used   Substance Use Topics    Alcohol use:  Yes     Alcohol/week: 1.0 standard drinks     Types: 1 Glasses of wine per week     Comment: 1-3 drinks per month        Family History   Family history unknown: Yes        No Known Allergies     Prior to Admission medications    Medication Sig Start Date End Date Taking? Authorizing Provider   oxyCODONE-acetaminophen (PERCOCET) 5-325 mg per tablet Take 1-2 Tabs by mouth every four (4) hours as needed for Pain for up to 5 days. Max Daily Amount: 12 Tabs. Indications: pain 8/11/19 8/16/19 Yes Ca Miner MD   amoxicillin-clavulanate (AUGMENTIN) 875-125 mg per tablet Take  by mouth every twelve (12) hours. Yes aC Miner MD   amoxicillin-clavulanate (AUGMENTIN) 875-125 mg per tablet Take  by mouth every twelve (12) hours. Yes Ca Miner MD   amoxicillin-clavulanate (AUGMENTIN) 875-125 mg per tablet Take  by mouth every twelve (12) hours. Yes Ca Miner MD   amoxicillin-clavulanate (AUGMENTIN) 875-125 mg per tablet Take 1 Tab by mouth two (2) times a day.  8/11/19  Yes Ca Miner MD         Current Facility-Administered Medications:     acetaminophen (TYLENOL) tablet 650 mg, 650 mg, Oral, Q4H PRN, Edwin Rao MD, 650 mg at 08/13/19 0535    docusate sodium (COLACE) capsule 100 mg, 100 mg, Oral, BID, ENZO Lazaro, 100 mg at 08/13/19 1857    bisacodyl (DULCOLAX) suppository 10 mg, 10 mg, Rectal, DAILY, ENZO Lazaro, 10 mg at 08/13/19 1141    enoxaparin (LOVENOX) injection 40 mg, 40 mg, SubCUTAneous, Q24H, Aliza Roche PA, 40 mg at 08/13/19 1141    dextrose 5% - 0.45% NaCl with KCl 20 mEq/L infusion, 75 mL/hr, IntraVENous, CONTINUOUS, Krystle Lazaro Last Rate: 75 mL/hr at 08/13/19 1855, 75 mL/hr at 08/13/19 1855    ondansetron (ZOFRAN) injection 4 mg, 4 mg, IntraVENous, Q6H PRN, Ca Miner MD    morphine injection 2 mg, 2 mg, IntraVENous, Q2H PRN, Ca Miner MD  Paul Stef  oxyCODONE-acetaminophen (PERCOCET) 5-325 mg per tablet 1-2 Tab, 1-2 Tab, Oral, Q4H PRN, Ca Miner MD, 1 Tab at 08/12/19 1644    piperacillin-tazobactam (ZOSYN) 3.375 g in 0.9% sodium chloride (MBP/ADV) 100 mL, 3.375 g, IntraVENous, Q8H, Ca Miner MD, Last Rate: 25 mL/hr at 08/13/19 1857, 3.375 g at 08/13/19 1857    Review of Systems:  (bold if positive, if negative)    Gen:  feverEyes:  ENT:  CVS:  Pulm:  GI:    :    MS:  Skin:  Psych:  Endo:    Hem:  Renal:    Neuro:            Objective:      VITALS:    Vital signs reviewed; most recent are:    Visit Vitals  /81 (BP 1 Location: Right arm, BP Patient Position: At rest)   Pulse (!) 128   Temp (!) 102.5 °F (39.2 °C)   Resp 14   Ht 5' 2\" (1.575 m)   Wt 83.9 kg (185 lb)   SpO2 (!) 89%   BMI 33.84 kg/m²     SpO2 Readings from Last 6 Encounters:   08/13/19 (!) 89%    O2 Flow Rate (L/min): 2 l/min       Intake/Output Summary (Last 24 hours) at 8/13/2019 2020  Last data filed at 8/13/2019 0544  Gross per 24 hour   Intake 100 ml   Output 60 ml   Net 40 ml      All Micro Results     Procedure Component Value Units Date/Time    CULTURE, BLOOD, PAIRED [[id number]]     Order Status:  Sent Specimen:  Blood     URINE CULTURE HOLD SAMPLE [[id number]] Collected:  08/11/19 1733    Order Status:  Completed Specimen:  Serum Updated:  08/11/19 1743     Urine culture hold       URINE ON HOLD IN MICROBIOLOGY DEPT FOR 3 DAYS. IF UNPRESERVED URINE IS SUBMITTED, IT CANNOT BE USED FOR ADDITIONAL TESTING AFTER 24 HRS, RECOLLECTION WILL BE REQUIRED. Exam:     Physical Exam:    Gen:  Well-developed, well-nourished, in no acute distress  HEENT:  Pink conjunctivae, PERRL, hearing intact to voice, moist mucous membranes  Neck:  Supple, without masses, thyroid non-tender  Resp:  No accessory muscle use, clear breath sounds without wheezes rales or rhonchi  Card:   Tachy, No murmurs, normal S1, S2 without thrills, bruits or peripheral edema  Abd:  Soft, non-tender, non-distended, normoactive bowel sounds are present, no mass  Lymph:  No cervical or inguinal adenopathy  Musc:  No cyanosis or clubbing  Skin:  No rashes or ulcers, skin turgor is good  Neuro:  Cranial nerves are grossly intact, no focal motor weakness, follows commands appropriately  Psych:  Good insight, oriented to person, place and time, alert       Labs:    Recent Labs     08/13/19  1951   WBC 7.7   HGB 13.3   HCT 39.7        Recent Labs     08/13/19  0408   *   K 3.6   CL 98   CO2 28   *   BUN 8   CREA 0.79   CA 8.5   ALB 2.8*   TBILI 0.8   SGOT 32   ALT 47     No results found for: GLUCPOC  No results for input(s): PH, PCO2, PO2, HCO3, FIO2 in the last 72 hours. No results for input(s): INR in the last 72 hours.     No lab exists for component: INREXT    I have reviewed previous records,    Telemetry reviewed:   Sinus tachycardia    Risk of deterioration: medium      Total time spent with patient: 50 min                  Care Plan discussed with: Patient, Family and Nursing Staff    Discussed:  Care Plan       ___________________________________________________    Attending Physician: Ramonita Miranda DO

## 2019-08-14 NOTE — PROGRESS NOTES
Problem: Falls - Risk of  Goal: *Absence of Falls  Description  Document Sameer Castillo Fall Risk and appropriate interventions in the flowsheet.   Outcome: Progressing Towards Goal  Note:   Fall Risk Interventions:            Medication Interventions: Evaluate medications/consider consulting pharmacy, Patient to call before getting OOB, Teach patient to arise slowly, Assess postural VS orthostatic hypotension                   Problem: Pain  Goal: *Control of Pain  Outcome: Progressing Towards Goal     Problem: Infection - Risk of, Surgical Site Infection  Goal: *Absence of surgical site infection signs and symptoms  Outcome: Progressing Towards Goal

## 2019-08-15 VITALS
BODY MASS INDEX: 33.58 KG/M2 | HEART RATE: 89 BPM | SYSTOLIC BLOOD PRESSURE: 179 MMHG | DIASTOLIC BLOOD PRESSURE: 116 MMHG | WEIGHT: 182.5 LBS | TEMPERATURE: 98 F | HEIGHT: 62 IN | OXYGEN SATURATION: 93 % | RESPIRATION RATE: 14 BRPM

## 2019-08-15 LAB
GLUCOSE BLD STRIP.AUTO-MCNC: 141 MG/DL (ref 65–100)
GLUCOSE BLD STRIP.AUTO-MCNC: 197 MG/DL (ref 65–100)
SERVICE CMNT-IMP: ABNORMAL
SERVICE CMNT-IMP: ABNORMAL

## 2019-08-15 PROCEDURE — 82962 GLUCOSE BLOOD TEST: CPT

## 2019-08-15 PROCEDURE — 94760 N-INVAS EAR/PLS OXIMETRY 1: CPT

## 2019-08-15 PROCEDURE — 74011000258 HC RX REV CODE- 258: Performed by: SURGERY

## 2019-08-15 PROCEDURE — 74011250636 HC RX REV CODE- 250/636: Performed by: SURGERY

## 2019-08-15 PROCEDURE — 74011250636 HC RX REV CODE- 250/636: Performed by: PHYSICIAN ASSISTANT

## 2019-08-15 PROCEDURE — 74011250637 HC RX REV CODE- 250/637: Performed by: PHYSICIAN ASSISTANT

## 2019-08-15 RX ORDER — METFORMIN HYDROCHLORIDE 500 MG/1
500 TABLET ORAL
Qty: 30 TAB | Refills: 0 | Status: SHIPPED | OUTPATIENT
Start: 2019-08-15 | End: 2019-08-22 | Stop reason: ALTCHOICE

## 2019-08-15 RX ADMIN — PIPERACILLIN SODIUM,TAZOBACTAM SODIUM 3.38 G: 3; .375 INJECTION, POWDER, FOR SOLUTION INTRAVENOUS at 04:35

## 2019-08-15 RX ADMIN — ENOXAPARIN SODIUM 40 MG: 40 INJECTION SUBCUTANEOUS at 11:28

## 2019-08-15 RX ADMIN — PIPERACILLIN SODIUM,TAZOBACTAM SODIUM 3.38 G: 3; .375 INJECTION, POWDER, FOR SOLUTION INTRAVENOUS at 09:09

## 2019-08-15 RX ADMIN — DOCUSATE SODIUM 100 MG: 100 CAPSULE ORAL at 09:09

## 2019-08-15 NOTE — PROGRESS NOTES
General Surgery Daily Progress Note    Patient: Steven Mojica MRN: [id number]  SSN: [id number]    YOB: 1960  Age: 61 y.o. Sex: female      Admit Date: 8/11/2019    Subjective:   Feeling well, denies pain, no N/V, tolerating diet. Afebrile for the last day     Current Facility-Administered Medications   Medication Dose Route Frequency    insulin glargine (LANTUS) injection 8 Units  8 Units SubCUTAneous QHS    acetaminophen (TYLENOL) tablet 650 mg  650 mg Oral Q4H PRN    docusate sodium (COLACE) capsule 100 mg  100 mg Oral BID    bisacodyl (DULCOLAX) suppository 10 mg  10 mg Rectal DAILY    enoxaparin (LOVENOX) injection 40 mg  40 mg SubCUTAneous Q24H    0.9% sodium chloride infusion  75 mL/hr IntraVENous CONTINUOUS    hydrALAZINE (APRESOLINE) 20 mg/mL injection 20 mg  20 mg IntraVENous Q6H PRN    glucose chewable tablet 16 g  4 Tab Oral PRN    glucagon (GLUCAGEN) injection 1 mg  1 mg IntraMUSCular PRN    dextrose 10% infusion 125-250 mL  125-250 mL IntraVENous PRN    insulin lispro (HUMALOG) injection   SubCUTAneous AC&HS    ondansetron (ZOFRAN) injection 4 mg  4 mg IntraVENous Q6H PRN    morphine injection 2 mg  2 mg IntraVENous Q2H PRN    oxyCODONE-acetaminophen (PERCOCET) 5-325 mg per tablet 1-2 Tab  1-2 Tab Oral Q4H PRN    piperacillin-tazobactam (ZOSYN) 3.375 g in 0.9% sodium chloride (MBP/ADV) 100 mL  3.375 g IntraVENous Q8H        Objective:   No intake/output data recorded. 08/13 1901 - 08/15 0700  In: 1520.8 [I.V.:1520.8]  Out: 1355 [Urine:1300; Drains:55]  Patient Vitals for the past 8 hrs:   BP Temp Pulse Resp SpO2   08/15/19 0756 (!) 185/96 97.1 °F (36.2 °C) 90 16 96 %   08/15/19 0359 150/89 97.6 °F (36.4 °C) 85 18 93 %       Physical Exam:  General: Alert, cooperative, NAD  Lungs: Unlabored  Heart:  RRR  Abdomen: Soft, ATTP, mildly distended. + bowel sounds. Incisions c/d/i. SHERIDAN drain SS.    Extremities: Warm, moves all, no edema  Skin:  Warm and dry, no rash    Labs:   Recent Labs     08/14/19  0208   WBC 6.6   HGB 12.9   HCT 38.1        Recent Labs     08/14/19  0208      K 3.0*      CO2 29   *   BUN 8   CREA 0.79   CA 8.3*   ALB 2.5*   TBILI 0.7   SGOT 19   ALT 35       Assessment / Plan:   · POD#4 lap evy  · Abdominal exam appropriate  · SIRS/sepsis: Resolving, afebrile now and tachycardia better. Blood cultures NGTD. · Diet as tolerated  · Mobilize  · Bowel regimen  · Lovenox for DVT prophylaxis   · Expect discharge today vs tomorrow.  D/c drain at discharge

## 2019-08-15 NOTE — DIABETES MGMT
[facility]    [facility] Consult Follow-Up Note    Recommendations/ Comments: Patient seen for follow-up visit. Provided glucometer teaching with Contour Next One. Reviewed BG targets and lifestyle management goals. Discussed MOA for medication. If appropriate, please consider: Noted MD to provide scripts on discharge for Contour Next Testing strips and Microlet lancets. Current hospital DM medication:   Lantus 8 units nightly  Lispro high sensitivity correction scale    Consult received for:  []             Assessment of home management                []      Medication Recommendations                [x]             Meter/monitoring     []             Insulin instruction     [x]             New diagnosis     []             Outpatient education     []             Insulin pump patient     []             Insulin infusion     []             DKA/HHS    Chart reviewed  on Surekha Francois. Patient is a 61 y.o. female with new diagnosis Type 2 Diabetes. Assessed and instructed patient on the following:   ·  blood sugar goals  · SMBG skills and   · referred to Diabetes Educator    Please see previous consult note for additional teaching documentation. Encouraged the following:   · increased exercise,   · dietary modifications: Balanced plate, 3 meals daily   · regular blood sugar monitoring: daily, rotating times  · Establish with PCP for outpatient DTC referral     Provided patient with the following: [x]             Diabetes Self Care Guide               []             Insulin education materials               []             CHO counting education materials               [x]             Outpatient DTC contact number               [x]             Glucometer               Patient was able to give return demonstration of    [x]       Glucometer    []       saline injection      with []     without []       assistance needed.     []       Nurse to have patient self inject prior to discharge. Discussed with patient and/or family need for follow up appointment for diabetes management after discharge. A1c:   Lab Results   Component Value Date/Time    Hemoglobin A1c 8.1 (H) 08/13/2019 08:12 PM       Recent Glucose Results:   Lab Results   Component Value Date/Time    GLUCPOC 197 (H) 08/15/2019 10:56 AM    GLUCPOC 141 (H) 08/15/2019 06:48 AM    GLUCPOC 159 (H) 08/14/2019 09:34 PM        Lab Results   Component Value Date/Time    Creatinine 0.79 08/14/2019 02:08 AM     Estimated Creatinine Clearance: 76.5 mL/min (based on SCr of 0.79 mg/dL). Active Orders   Diet    DIET REGULAR        PO intake: No data found. Will continue to follow as needed. Thank you.   Sarah Campuzano RN  Office [phone]  Pager [phone]        Time spent: 20 min

## 2019-08-15 NOTE — PROGRESS NOTES
Problem: Falls - Risk of  Goal: *Absence of Falls  Description  Document Travis Boeck Fall Risk and appropriate interventions in the flowsheet.   Outcome: Progressing Towards Goal  Note:   Fall Risk Interventions:            Medication Interventions: Patient to call before getting OOB, Assess postural VS orthostatic hypotension                   Problem: Pain  Goal: *Control of Pain  Outcome: Progressing Towards Goal     Problem: Infection - Risk of, Surgical Site Infection  Goal: *Absence of surgical site infection signs and symptoms  Outcome: Progressing Towards Goal

## 2019-08-15 NOTE — ROUTINE PROCESS
Bedside shift change report given to Nakita Lang RN (oncoming nurse) by Doyle Araiza RN (offgoing nurse). Report included the following information SBAR, Kardex and MAR.

## 2019-08-15 NOTE — PROGRESS NOTES
Marcelo Wu richard [address]  [address] [facility], [address] [place] [address]  [phone]      Medical Progress Note      NAME: Regina Overton   :  1960  MRM:  [id number]    Date/Time: 8/15/2019            Subjective:     Chief Complaint:  F/u fever    ROS:  (bold if positive, if negative)      Tolerating PT  Tolerating Diet          Objective:       Vitals:          Last 24hrs VS reviewed since prior progress note.  Most recent are:    Visit Vitals  BP (!) 179/116 (BP 1 Location: Right arm, BP Patient Position: Head of bed elevated (Comment degrees))   Pulse 89   Temp 98 °F (36.7 °C)   Resp 14   Ht 5' 2\" (1.575 m)   Wt 82.8 kg (182 lb 8 oz)   SpO2 93%   BMI 33.38 kg/m²     SpO2 Readings from Last 6 Encounters:   08/15/19 93%    O2 Flow Rate (L/min): 2 l/min       Intake/Output Summary (Last 24 hours) at 8/15/2019 1154  Last data filed at 8/15/2019 0438  Gross per 24 hour   Intake    Output 955 ml   Net -955 ml          Exam:     Physical Exam:    Gen:  Well-developed, well-nourished, in no acute distress  HEENT:  Pink conjunctivae, PERRL, hearing intact to voice, moist mucous membranes  Neck:  Supple, without masses, thyroid non-tender  Resp:  No accessory muscle use, clear breath sounds without wheezes rales or rhonchi  Card:  No murmurs, normal S1, S2 without thrills, bruits or peripheral edema  Abd:  Soft, non-tender, non-distended, normoactive bowel sounds are present  Musc:  No cyanosis or clubbing  Skin:  No rashes or ulcers, skin turgor is good  Neuro:  Cranial nerves 3-12 are grossly intact,  strength is 5/5 bilaterally and dorsi / plantarflexion is 5/5 bilaterally, follows commands appropriately  Psych:  Good insight, oriented to person, place and time, alert            Medications Reviewed: (see below)    Lab Data Reviewed: (see below)    ______________________________________________________________________    Medications:     Current Facility-Administered Medications   Medication Dose Route Frequency    insulin glargine (LANTUS) injection 8 Units  8 Units SubCUTAneous QHS    acetaminophen (TYLENOL) tablet 650 mg  650 mg Oral Q4H PRN    docusate sodium (COLACE) capsule 100 mg  100 mg Oral BID    bisacodyl (DULCOLAX) suppository 10 mg  10 mg Rectal DAILY    enoxaparin (LOVENOX) injection 40 mg  40 mg SubCUTAneous Q24H    hydrALAZINE (APRESOLINE) 20 mg/mL injection 20 mg  20 mg IntraVENous Q6H PRN    glucose chewable tablet 16 g  4 Tab Oral PRN    glucagon (GLUCAGEN) injection 1 mg  1 mg IntraMUSCular PRN    dextrose 10% infusion 125-250 mL  125-250 mL IntraVENous PRN    insulin lispro (HUMALOG) injection   SubCUTAneous AC&HS    ondansetron (ZOFRAN) injection 4 mg  4 mg IntraVENous Q6H PRN    morphine injection 2 mg  2 mg IntraVENous Q2H PRN    oxyCODONE-acetaminophen (PERCOCET) 5-325 mg per tablet 1-2 Tab  1-2 Tab Oral Q4H PRN    piperacillin-tazobactam (ZOSYN) 3.375 g in 0.9% sodium chloride (MBP/ADV) 100 mL  3.375 g IntraVENous Q8H            Lab Review:     Recent Labs     08/14/19  0208 08/13/19 1951 08/13/19  0408   WBC 6.6 7.7 13.0*   HGB 12.9 13.3 13.5   HCT 38.1 39.7 40.1    174 185     Recent Labs     08/14/19  0208 08/13/19 1951 08/13/19  0408    132* 134*   K 3.0* 3.4* 3.6    96* 98   CO2 29 28 28   * 307* 251*   BUN 8 7 8   CREA 0.79 0.84 0.79   CA 8.3* 8.4* 8.5   ALB 2.5*  --  2.8*   SGOT 19  --  32   ALT 35  --  47     No components found for: GLPOC         Assessment / Plan:       Acute cholecystitis due to biliary calculus / S/P cholecystectomy: POA. POD#2. Gen surgery is attending of record and will manage pain, diet, and disposition as usual.     Sepsis / Fever / Leukocytosis / Sinus tachycardia. Suspect this is related to cholecystitis. CTA C/A/P reviewed, neg for PE, abd with gas and fluid, likely related to procedure. Agree with zosyn. Check urine, bcx, lactic acid.       Hypertension: bp elevated.  Discussed with patient, she would like to wait to start a bp medication until she is out of the hospital and her acute illness has resolved     Hypoxia: now improving. Likely related to sepsis, pain. Supplemental oxygen as needed. Add IS. Control pain.     Type 2 diabetes mellitus: A1c 8.1. Prescription printed for metformin, test strips and lancets for discharge.  Pt educated on dm and lifestyle modifications    Hypokalemia: replete prn      Total time spent with patient: 39 [phone] [facility] discussed with: Patient    Discussed:  Care Plan               ___________________________________________________    Attending Physician: Davonte Capone MD

## 2019-08-15 NOTE — DISCHARGE SUMMARY
Surgery Discharge Summary     Patient ID:  Martha Randolph  [id number]  18 y.o.  1960    Admit date: 8/11/2019    Discharge date and time: 8/15/2019    Admission Diagnoses:    Patient Active Problem List   Diagnosis Code    Acute cholecystitis due to biliary calculus K80.00    Sepsis ([facility] [address] A41.9    Fever R50.9    Sinus tachycardia R00.0    Hypertension I10    Hypoxia R09.02    S/P cholecystectomy Z90.49    Leukocytosis D72.829    Hyperglycemia R73.9    Acute cholecystitis K81.0       Discharge Diagnoses: No discharge information exists for this patient. Patient Active Problem List   Diagnosis Code    Acute cholecystitis due to biliary calculus K80.00    [facility] A41.9    Fever R50.9    Sinus tachycardia R00.0    Hypertension I10    Hypoxia R09.02    S/P cholecystectomy Z90.49    Leukocytosis D72.829    Hyperglycemia R73.9    Acute cholecystitis K81.0       Procedures for this admission: Procedure(s):  [address] Course: Ms. Marcia Robert presented to the ED on date of admission with c/o abdominal pain and was found to have evidence of acute cholecystitis. She was taken to the OR and underwent lap evy. She was admitted to the hospital for continued IV ABX after surgery d/t severity of infection. POD2 she developed fever and tachycardia. She was managed with IV ABX, IVF. CTA chest and CT A/P showed no PE and expected postoperative findings. Fever and tachycardia resolved with supportive care and she was discharged home in stable condition. Drain was removed prior to discharge. Disposition: home    Discharged Condition : stable    Instructions: Follow-up in office  in 1-2 weeks.               Signed:  ENZO Miller  8/15/2019  10:25 AM

## 2019-08-18 LAB
BACTERIA SPEC CULT: NORMAL
SERVICE CMNT-IMP: NORMAL

## 2019-08-20 ENCOUNTER — TELEPHONE (OUTPATIENT)
Dept: INTERNAL MEDICINE CLINIC | Age: 59
End: 2019-08-20

## 2019-08-20 NOTE — TELEPHONE ENCOUNTER
----- Message from Clara Kim Brenda sent at 8/20/2019  9:47 AM EDT -----  Regarding: Dr. Blanca Johnson: PT  Reason for call: Was told by Theresachanel Barr (her mother in law and PT of Dr. Quita Esparza) that Dr. Quita Esparza would take her on as a new patient- and that someone would get with her from the office. She's following up and hasn't heard back from anybody- this is her 2nd call. Callback requested: YES  Best contact: [phone]  Additional details: PT was recently released from hospital due to gall bladder issues, and would like to set something up as soon as possible.

## 2019-08-22 ENCOUNTER — PATIENT MESSAGE (OUTPATIENT)
Dept: INTERNAL MEDICINE CLINIC | Age: 59
End: 2019-08-22

## 2019-08-22 ENCOUNTER — OFFICE VISIT (OUTPATIENT)
Dept: INTERNAL MEDICINE CLINIC | Age: 59
End: 2019-08-22

## 2019-08-22 VITALS
OXYGEN SATURATION: 97 % | SYSTOLIC BLOOD PRESSURE: 145 MMHG | TEMPERATURE: 98.5 F | WEIGHT: 192 LBS | HEART RATE: 102 BPM | BODY MASS INDEX: 35.33 KG/M2 | HEIGHT: 62 IN | DIASTOLIC BLOOD PRESSURE: 95 MMHG | RESPIRATION RATE: 16 BRPM

## 2019-08-22 DIAGNOSIS — I10 ESSENTIAL HYPERTENSION: ICD-10-CM

## 2019-08-22 DIAGNOSIS — Z76.89 ESTABLISHING CARE WITH NEW DOCTOR, ENCOUNTER FOR: Primary | ICD-10-CM

## 2019-08-22 DIAGNOSIS — E11.8 TYPE 2 DIABETES MELLITUS WITH COMPLICATION, WITHOUT LONG-TERM CURRENT USE OF INSULIN (HCC): Primary | ICD-10-CM

## 2019-08-22 DIAGNOSIS — E66.9 CLASS 1 OBESITY IN ADULT, UNSPECIFIED BMI, UNSPECIFIED OBESITY TYPE, UNSPECIFIED WHETHER SERIOUS COMORBIDITY PRESENT: ICD-10-CM

## 2019-08-22 DIAGNOSIS — Z90.49 S/P CHOLECYSTECTOMY: ICD-10-CM

## 2019-08-22 DIAGNOSIS — Z23 ENCOUNTER FOR IMMUNIZATION: ICD-10-CM

## 2019-08-22 DIAGNOSIS — Z12.39 SCREENING FOR BREAST CANCER: ICD-10-CM

## 2019-08-22 DIAGNOSIS — E11.8 TYPE 2 DIABETES MELLITUS WITH COMPLICATION, WITHOUT LONG-TERM CURRENT USE OF INSULIN (HCC): ICD-10-CM

## 2019-08-22 DIAGNOSIS — K81.0 ACUTE CHOLECYSTITIS: ICD-10-CM

## 2019-08-22 PROBLEM — R73.9 HYPERGLYCEMIA: Status: RESOLVED | Noted: 2019-08-13 | Resolved: 2019-08-22

## 2019-08-22 PROBLEM — R50.9 FEVER: Status: RESOLVED | Noted: 2019-08-13 | Resolved: 2019-08-22

## 2019-08-22 PROBLEM — R09.02 HYPOXIA: Status: RESOLVED | Noted: 2019-08-13 | Resolved: 2019-08-22

## 2019-08-22 PROBLEM — R00.0 SINUS TACHYCARDIA: Status: RESOLVED | Noted: 2019-08-13 | Resolved: 2019-08-22

## 2019-08-22 PROBLEM — D72.829 LEUKOCYTOSIS: Status: RESOLVED | Noted: 2019-08-13 | Resolved: 2019-08-22

## 2019-08-22 PROBLEM — A41.9 SEPSIS (HCC): Status: RESOLVED | Noted: 2019-08-13 | Resolved: 2019-08-22

## 2019-08-22 RX ORDER — VALSARTAN 80 MG/1
80 TABLET ORAL DAILY
Qty: 30 TAB | Refills: 3 | Status: SHIPPED | OUTPATIENT
Start: 2019-08-22 | End: 2019-11-03

## 2019-08-22 RX ORDER — METFORMIN HYDROCHLORIDE 500 MG/1
1000 TABLET, EXTENDED RELEASE ORAL
Qty: 60 TAB | Refills: 3 | Status: SHIPPED | OUTPATIENT
Start: 2019-08-22 | End: 2019-09-11

## 2019-08-22 RX ORDER — BLOOD-GLUCOSE METER
KIT MISCELLANEOUS
Qty: 1 KIT | Refills: 0 | Status: SHIPPED | OUTPATIENT
Start: 2019-08-22

## 2019-08-22 RX ORDER — LANCETS
EACH MISCELLANEOUS
Qty: 1 EACH | Refills: 11 | Status: SHIPPED | OUTPATIENT
Start: 2019-08-22

## 2019-08-22 NOTE — PROGRESS NOTES
Radha Chen is a 61 y.o. female who presents today for Establish Care  . She has a history of   Patient Active Problem List   Diagnosis Code    Acute cholecystitis due to biliary calculus K80.00    Sepsis ([facility] [address] A41.9    Fever R50.9    Sinus tachycardia R00.0    Hypertension I10    Hypoxia R09.02    S/P cholecystectomy Z90.49    Leukocytosis D72.829    Hyperglycemia R73.9    Acute cholecystitis K81.0   . Today patient is here to establish care. Previous PCP Dr. Darnell Sat. she is switching because has not seen them in some time. Cholecystitis: Patient was hospitalized from August 11 the 15th due to acute cholecystitis. She did undergo lap cholecystectomy on the 11th. She did develop a postsurgical fever and hypoxia and had a CTA which was negative, but this did resolve. She has been placed on Augmentin to complete a 7-day course. Pt saw the surgeon yesterday and is doing well. Finishing abx tonight. DM: Diagnosed during recent hospitalization. A1c was 8.1. Patient notes + family history of diabetes. She was placed on metformin at discharge. Her fasting sugars have been in the low 140s. She is taking just 1 metformin daily. HM: Cscope is UTD. MMG: Has never had one. We will order this today. [place] Bro HTN: noted during hospitalization. She has a + family history of HTN. 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 Lives at home with . 4 children. 6 Grandchildren. Family History: reviewed    ROS  Review of Systems   Constitutional: Negative for chills, fever and weight loss. HENT: Negative for congestion and sore throat. Eyes: Negative for blurred vision, double vision and photophobia. Respiratory: Negative for cough and shortness of breath. Cardiovascular: Negative for chest pain, palpitations and leg swelling. Gastrointestinal: Negative for abdominal pain, constipation, diarrhea, heartburn, nausea and vomiting.    Genitourinary: Negative for dysuria, frequency and urgency. Musculoskeletal: Negative for joint pain and myalgias. Skin: Negative for rash. Neurological: Negative. Negative for headaches. Endo/Heme/Allergies: Does not bruise/bleed easily. Psychiatric/Behavioral: Negative for memory loss and suicidal ideas. Visit Vitals  BP (!) 152/98 (BP 1 Location: Left arm, BP Patient Position: Sitting)   Pulse (!) 102   Temp 98.5 °F (36.9 °C) (Oral)   Resp 16   Ht 5' 2\" (1.575 m)   Wt 192 lb (87.1 kg)   SpO2 97%   BMI 35.12 kg/m²       Physical Exam   Constitutional: She is oriented to person, place, and time and well-developed, well-nourished, and in no distress. No distress. HENT:   Head: Normocephalic and atraumatic. Mouth/Throat: No oropharyngeal exudate. Eyes: Pupils are equal, round, and reactive to light. Conjunctivae are normal. No scleral icterus. Neck: Normal range of motion. No JVD present. No thyromegaly present. Cardiovascular: Normal rate and regular rhythm. Exam reveals no gallop and no friction rub. No murmur heard. Pulmonary/Chest: Effort normal and breath sounds normal. No respiratory distress. She has no wheezes. Abdominal: Soft. Bowel sounds are normal. She exhibits no distension. There is no rebound and no guarding. Healed lap evy scars to abdomen. Abdomen soft   Musculoskeletal: Normal range of motion. She exhibits no edema. Neurological: She is alert and oriented to person, place, and time. No cranial nerve deficit. Skin: Skin is dry. No rash noted. Foot exam  - skin intact, mild dryness w no fissures, no rashes, no significant ulcers or callous formation. Sensation intact by microfilament or light touch     Psychiatric: Mood, memory, affect and judgment normal.       Current Outpatient Medications   Medication Sig    metFORMIN (GLUCOPHAGE) 500 mg tablet Take 1 Tab by mouth daily (with dinner).     OTHER,NON-FORMULARY, Glucose test strips and lancets    amoxicillin-clavulanate (AUGMENTIN) 875-125 mg per tablet Take 1 Tab by mouth two (2) times a day. No current facility-administered medications for this visit. Past Medical History:   Diagnosis Date    Type 2 diabetes mellitus ([facility] [address]       Past Surgical History:   Procedure Laterality Date    HX CHOLECYSTECTOMY      HX LAP CHOLECYSTECTOMY  08/11/2019      Social History     Tobacco Use    Smoking status: Never Smoker    Smokeless tobacco: Never Used   Substance Use Topics    Alcohol use: Yes     Alcohol/week: 1.0 standard drinks     Types: 1 Glasses of wine per week     Comment: 1-3 drinks per month      Family History   Problem Relation Age of Onset    Diabetes Mother         type 2    Hypertension Mother     Diabetes Sister     Congenital heart defect Maternal Grandmother         No Known Allergies     Assessment/Plan  Diagnoses and all orders for this visit:    1. Establishing care with new doctor, encounter for -we will get patient's old records. I reviewed her recent hospitalization. 2. Essential hypertension -patient does have strong family history of this. Will start 40 mg of valsartan and increase to 80 if her blood pressure still elevated. Check potassium and creatinine after 1 week of this medication.  -     METABOLIC PANEL, BASIC  -     valsartan (DIOVAN) 80 mg tablet; Take 1 Tab by mouth daily. 3. Acute cholecystitis -status post cholecystectomy. Patient doing well postop    4. S/P cholecystectomy    5. Class 1 obesity in adult, unspecified BMI, unspecified obesity type, unspecified whether serious comorbidity present -counseled on diet and exercise    6. Type 2 diabetes mellitus with complication, without long-term current use of insulin ([facility] [address] -foot exam normal today. Blood sugars of Michele dropped to the 140s.   Will increase metformin to 1000 mg daily and have her check in with us in a couple weeks to see where sugars are  -     MICROALBUMIN, UR, RAND W/ MICROALB/CREAT RATIO  -     LIPID PANEL  -     lancets misc; Check fasting blood sugars daily. -     metFORMIN ER (GLUCOPHAGE XR) 500 mg tablet; Take 2 Tabs by mouth daily (with dinner). -     glucose blood VI test strips (ASCENSIA AUTODISC VI, ONE TOUCH ULTRA TEST VI) strip; Check fasting blood sugars daily. Contour Next one.    7. Screening for breast cancer -due for this  -     [facility] MAMMO BI SCREENING INCL CAD; Future    8. Encounter for immunization -will be having a new grandbaby soon. -     TETANUS, DIPHTHERIA TOXOIDS AND ACELLULAR PERTUSSIS VACCINE (TDAP), IN INDIVIDS. >=7, IM  -     IN IMMUNIZ ADMIN,1 SINGLE/COMB VAC/TOXOID            Wash MD Bethany  8/22/2019    This note was created with the help of speech recognition software (Dragon) and may contain some 'sound alike' errors.

## 2019-08-22 NOTE — PROGRESS NOTES
Pt is not fasting today. Pt is due for diabetic foot exam.    Colonoscopy: [facility]. Mammogram: Has never had one. PAP: about 5 years ago. Doesn't have a current GYN. Pt previous PCP, Manjeet Acosta did her PAPS. Eye Exams: [facility].      Shingles: Pt was educated on new shingrix vaccine and where to obtain inj.

## 2019-08-26 NOTE — TELEPHONE ENCOUNTER
From: Rima Cortez  To: Torsten Mota MD  Sent: 8/22/2019 8:10 PM EDT  Subject: Prescription Question    Dr. Jolynn Lamb were right about questions BUT only 2. That's not to bad, right? I was notified by the pharmacy that my insurance doesn't cover the Contour Next One. They will be contacting the office to see what you want to do. Also, I forgot to ask you about getting a referral (not sure if right term) for insurance for the monthly classes/groups at [facility] for diabetes. Thank you again for everything and I will wait to hear from the pharmacy.      Rima Cortez

## 2019-08-29 ENCOUNTER — HOSPITAL ENCOUNTER (OUTPATIENT)
Dept: MAMMOGRAPHY | Age: 59
Discharge: HOME | End: 2019-08-29
Attending: INTERNAL MEDICINE
Payer: COMMERCIAL

## 2019-08-29 DIAGNOSIS — Z12.39 SCREENING FOR BREAST CANCER: ICD-10-CM

## 2019-08-29 PROCEDURE — 77067 SCR MAMMO BI INCL CAD: CPT

## 2019-09-11 ENCOUNTER — OFFICE VISIT (OUTPATIENT)
Dept: INTERNAL MEDICINE CLINIC | Age: 59
End: 2019-09-11

## 2019-09-11 VITALS
DIASTOLIC BLOOD PRESSURE: 90 MMHG | HEIGHT: 62 IN | HEART RATE: 126 BPM | SYSTOLIC BLOOD PRESSURE: 132 MMHG | BODY MASS INDEX: 33.13 KG/M2 | TEMPERATURE: 98 F | OXYGEN SATURATION: 98 % | RESPIRATION RATE: 16 BRPM | WEIGHT: 180 LBS

## 2019-09-11 DIAGNOSIS — I10 ESSENTIAL HYPERTENSION: ICD-10-CM

## 2019-09-11 DIAGNOSIS — E11.8 TYPE 2 DIABETES MELLITUS WITH COMPLICATION, WITHOUT LONG-TERM CURRENT USE OF INSULIN (HCC): Primary | ICD-10-CM

## 2019-09-11 DIAGNOSIS — R11.2 NAUSEA AND VOMITING, INTRACTABILITY OF VOMITING NOT SPECIFIED, UNSPECIFIED VOMITING TYPE: ICD-10-CM

## 2019-09-11 RX ORDER — METFORMIN HYDROCHLORIDE 500 MG/1
500 TABLET ORAL 2 TIMES DAILY WITH MEALS
Qty: 60 TAB | Refills: 2 | Status: SHIPPED | OUTPATIENT
Start: 2019-09-11 | End: 2019-12-20 | Stop reason: SDUPTHER

## 2019-09-11 RX ORDER — LANCETS 33 GAUGE
EACH MISCELLANEOUS
Refills: 1 | COMMUNITY
Start: 2019-08-22 | End: 2020-08-31

## 2019-09-11 NOTE — PROGRESS NOTES
Epi Wyman is a 61 y.o. female who presents today for Medication Evaluation; Nausea; and Vomiting  . She has a history of   Patient Active Problem List   Diagnosis Code    Acute cholecystitis due to biliary calculus K80.00    Hypertension I10    S/P cholecystectomy Z90.49    Acute cholecystitis K81.0    Class 1 obesity in adult E66.9   . Today patient is here for medication evaluation. .     Diabetes Mellitus follow-up -patient was initially started on metformin at discharge hospitalization. Since we have increased her dose to 1000milligrams of metformin. She notes since having abdominal pain cramping and nausea. She has had several episodes of throwing up. Since she is stopped all her medications last Friday. Today she does feel better than she has in some time. Last hemoglobin a1c   Lab Results   Component Value Date/Time    Hemoglobin A1c 8.1 (H) 08/13/2019 08:12 PM     No components found for: POCA1C, HBA1C POC  medication compliance: compliant all of the time. Diabetic diet compliance: compliant most of the time. Home glucose monitoring: fasting values range 130-140. Hypoglycemic episodes:  none. Further diabetic ROS: no polyuria or polydipsia, no chest pain, dyspnea or TIA's, no numbness, tingling or pain in extremities  Microalbuminuria: No results found for: MCACR, MCA1, MCA2, MCA3, MCAU, MCAU2, MCALPOCT    Hypertension  Currently not taking her blood pressure medication. reports that she has never smoked. She has never used smokeless tobacco.    reports that she drinks about 1.0 standard drinks of alcohol per week. BP Readings from Last 2 Encounters:   09/11/19 132/90   08/22/19 (!) 145/95         ROS  Review of Systems   Constitutional: Negative for chills, fever and weight loss. HENT: Negative for congestion and sore throat. Eyes: Negative for blurred vision, double vision and photophobia. Respiratory: Negative for cough and shortness of breath. Cardiovascular: Negative for chest pain, palpitations and leg swelling. Gastrointestinal: Positive for abdominal pain, nausea and vomiting. Negative for constipation, diarrhea and heartburn. Genitourinary: Negative for dysuria, frequency and urgency. Musculoskeletal: Negative for joint pain and myalgias. Skin: Negative for rash. Neurological: Negative. Negative for headaches. Endo/Heme/Allergies: Does not bruise/bleed easily. Psychiatric/Behavioral: Negative for memory loss and suicidal ideas. Visit Vitals  /90 (BP 1 Location: Left arm, BP Patient Position: Sitting)   Pulse (!) 126   Temp 98 °F (36.7 °C) (Oral)   Resp 16   Ht 5' 2\" (1.575 m)   Wt 180 lb (81.6 kg)   SpO2 98%   BMI 32.92 kg/m²       Physical Exam   Constitutional: She is oriented to person, place, and time. She appears well-developed and well-nourished. HENT:   Head: Normocephalic and atraumatic. Eyes: Pupils are equal, round, and reactive to light. EOM are normal.   Cardiovascular: Normal rate and regular rhythm. No murmur heard. Tachycardic   Pulmonary/Chest: Effort normal. No respiratory distress. Abdominal: Soft. Bowel sounds are normal. She exhibits no distension. There is no tenderness. There is no guarding. Neurological: She is alert and oriented to person, place, and time. Skin: Skin is warm and dry. Psychiatric: She has a normal mood and affect. Her behavior is normal.         Current Outpatient Medications   Medication Sig    ONE TOUCH DELICA 33 gauge misc CHECK FASTING BLOOD SUGARS DAILY    metFORMIN (GLUCOPHAGE) 500 mg tablet Take 1 Tab by mouth two (2) times daily (with meals).  lancets misc Check fasting blood sugars daily.  valsartan (DIOVAN) 80 mg tablet Take 1 Tab by mouth daily.  glucose blood VI test strips (ASCENSIA AUTODISC VI, ONE TOUCH ULTRA TEST VI) strip Check fasting blood sugars daily. Contour Next one.     ONETOUCH ULTRAMINI monitoring kit use as directed     No current facility-administered medications for this visit. Past Medical History:   Diagnosis Date    Type 2 diabetes mellitus ([facility] [address]       Past Surgical History:   Procedure Laterality Date    HX CHOLECYSTECTOMY      HX LAP CHOLECYSTECTOMY  08/11/2019      Social History     Tobacco Use    Smoking status: Never Smoker    Smokeless tobacco: Never Used   Substance Use Topics    Alcohol use: Yes     Alcohol/week: 1.0 standard drinks     Types: 1 Glasses of wine per week     Comment: 1-3 drinks per month      Family History   Problem Relation Age of Onset    Diabetes Mother         type 2    Hypertension Mother     Diabetes Sister     Congenital heart defect Maternal Grandmother         No Known Allergies     Assessment/Plan  Diagnoses and all orders for this visit:    1. Type 2 diabetes mellitus with complication, without long-term current use of insulin (HCC) -blood sugars have been below 180 but not quite to goal.  Patient stopped her metformin since last Friday. I am concerned that the extended release metformin is causing her side effects and therefore we will go back to the immediate release and slowly increase this dose to 1 tablet twice daily. If patient feels sick again she will let us know and we will switch her to [place]. Coupon card for this medication has been provided. -     metFORMIN (GLUCOPHAGE) 500 mg tablet; Take 1 Tab by mouth two (2) times daily (with meals). -     LIPID PANEL  -     MICROALBUMIN, UR, RAND W/ MICROALB/CREAT RATIO    2. Nausea and vomiting, intractability of vomiting not specified, unspecified vomiting type -check blood work to rule out other causes but more than likely secondary to medications. Plan is to slowly increase metformin  -     METABOLIC PANEL, COMPREHENSIVE  -     CBC WITH AUTOMATED DIFF    3.  Essential hypertension -patient will hold off resuming on ARB until she is been on metformin for 2 weeks to make sure that we can figure out which medications causing which issues. -     METABOLIC PANEL, COMPREHENSIVE  -     CBC WITH AUTOMATED DIFF            Real Solorio MD  9/11/2019    This note was created with the help of speech recognition software Gladis Damian) and may contain some 'sound alike' errors.

## 2019-09-11 NOTE — PATIENT INSTRUCTIONS
Start metformin once daily. Increase to twice daily after one week. If feeling well start BP medication in two weeks.

## 2019-09-12 DIAGNOSIS — R11.2 NAUSEA AND VOMITING, INTRACTABILITY OF VOMITING NOT SPECIFIED, UNSPECIFIED VOMITING TYPE: ICD-10-CM

## 2019-09-12 DIAGNOSIS — R79.89 ELEVATED LFTS: Primary | ICD-10-CM

## 2019-09-12 DIAGNOSIS — Z90.49 S/P CHOLECYSTECTOMY: ICD-10-CM

## 2019-09-12 LAB
ALBUMIN SERPL-MCNC: 4 G/DL (ref 3.5–5.5)
ALBUMIN/CREAT UR: 44.4 MG/G CREAT (ref 0–30)
ALBUMIN/GLOB SERPL: 1.3 {RATIO} (ref 1.2–2.2)
ALP SERPL-CCNC: 492 IU/L (ref 39–117)
ALT SERPL-CCNC: 236 IU/L (ref 0–32)
AST SERPL-CCNC: 148 IU/L (ref 0–40)
BASOPHILS # BLD AUTO: 0 X10E3/UL (ref 0–0.2)
BASOPHILS NFR BLD AUTO: 0 %
BILIRUB SERPL-MCNC: 2.7 MG/DL (ref 0–1.2)
BUN SERPL-MCNC: 10 MG/DL (ref 6–24)
BUN/CREAT SERPL: 11 (ref 9–23)
CALCIUM SERPL-MCNC: 9.6 MG/DL (ref 8.7–10.2)
CHLORIDE SERPL-SCNC: 92 MMOL/L (ref 96–106)
CHOLEST SERPL-MCNC: 185 MG/DL (ref 100–199)
CO2 SERPL-SCNC: 26 MMOL/L (ref 20–29)
CREAT SERPL-MCNC: 0.92 MG/DL (ref 0.57–1)
CREAT UR-MCNC: 403.5 MG/DL
EOSINOPHIL # BLD AUTO: 0.1 X10E3/UL (ref 0–0.4)
EOSINOPHIL NFR BLD AUTO: 0 %
ERYTHROCYTE [DISTWIDTH] IN BLOOD BY AUTOMATED COUNT: 14.6 % (ref 12.3–15.4)
GLOBULIN SER CALC-MCNC: 3.2 G/DL (ref 1.5–4.5)
GLUCOSE SERPL-MCNC: 202 MG/DL (ref 65–99)
HCT VFR BLD AUTO: 45.1 % (ref 34–46.6)
HDLC SERPL-MCNC: 40 MG/DL
HGB BLD-MCNC: 14.7 G/DL (ref 11.1–15.9)
IMM GRANULOCYTES # BLD AUTO: 0 X10E3/UL (ref 0–0.1)
IMM GRANULOCYTES NFR BLD AUTO: 0 %
INTERPRETATION, 910389: NORMAL
LDLC SERPL CALC-MCNC: 127 MG/DL (ref 0–99)
LYMPHOCYTES # BLD AUTO: 1 X10E3/UL (ref 0.7–3.1)
LYMPHOCYTES NFR BLD AUTO: 9 %
Lab: NORMAL
MCH RBC QN AUTO: 28.3 PG (ref 26.6–33)
MCHC RBC AUTO-ENTMCNC: 32.6 G/DL (ref 31.5–35.7)
MCV RBC AUTO: 87 FL (ref 79–97)
MICROALBUMIN UR-MCNC: 179.3 UG/ML
MONOCYTES # BLD AUTO: 1 X10E3/UL (ref 0.1–0.9)
MONOCYTES NFR BLD AUTO: 9 %
NEUTROPHILS # BLD AUTO: 9.2 X10E3/UL (ref 1.4–7)
NEUTROPHILS NFR BLD AUTO: 82 %
PLATELET # BLD AUTO: 241 X10E3/UL (ref 150–450)
POTASSIUM SERPL-SCNC: 4.2 MMOL/L (ref 3.5–5.2)
PROT SERPL-MCNC: 7.2 G/DL (ref 6–8.5)
RBC # BLD AUTO: 5.2 X10E6/UL (ref 3.77–5.28)
SODIUM SERPL-SCNC: 136 MMOL/L (ref 134–144)
TRIGL SERPL-MCNC: 92 MG/DL (ref 0–149)
VLDLC SERPL CALC-MCNC: 18 MG/DL (ref 5–40)
WBC # BLD AUTO: 11.3 X10E3/UL (ref 3.4–10.8)

## 2019-09-13 ENCOUNTER — HOSPITAL ENCOUNTER (OUTPATIENT)
Dept: CT IMAGING | Age: 59
Discharge: HOME | DRG: 446 | End: 2019-09-13
Attending: INTERNAL MEDICINE
Payer: COMMERCIAL

## 2019-09-13 ENCOUNTER — HOSPITAL ENCOUNTER (OUTPATIENT)
Age: 59
Setting detail: OBSERVATION
Discharge: HOME | DRG: 446 | End: 2019-09-15
Attending: EMERGENCY MEDICINE | Admitting: HOSPITALIST
Payer: COMMERCIAL

## 2019-09-13 ENCOUNTER — ANESTHESIA EVENT (OUTPATIENT)
Dept: SURGERY | Age: 59
DRG: 446 | End: 2019-09-13
Payer: COMMERCIAL

## 2019-09-13 ENCOUNTER — TELEPHONE (OUTPATIENT)
Dept: INTERNAL MEDICINE CLINIC | Age: 59
End: 2019-09-13

## 2019-09-13 DIAGNOSIS — K91.86 RETAINED GALLSTONES FOLLOWING LAPAROSCOPIC CHOLECYSTECTOMY: Primary | ICD-10-CM

## 2019-09-13 DIAGNOSIS — Z90.49 S/P CHOLECYSTECTOMY: ICD-10-CM

## 2019-09-13 DIAGNOSIS — R11.2 NAUSEA AND VOMITING, INTRACTABILITY OF VOMITING NOT SPECIFIED, UNSPECIFIED VOMITING TYPE: ICD-10-CM

## 2019-09-13 DIAGNOSIS — R79.89 ELEVATED LFTS: ICD-10-CM

## 2019-09-13 PROBLEM — K80.50 CHOLEDOCHOLITHIASIS: Status: ACTIVE | Noted: 2019-09-13

## 2019-09-13 LAB
ALBUMIN SERPL-MCNC: 3 G/DL (ref 3.5–5)
ALBUMIN/GLOB SERPL: 0.6 {RATIO} (ref 1.1–2.2)
ALP SERPL-CCNC: 459 U/L (ref 45–117)
ALT SERPL-CCNC: 188 U/L (ref 12–78)
ANION GAP SERPL CALC-SCNC: 9 MMOL/L (ref 5–15)
APPEARANCE UR: CLEAR
AST SERPL-CCNC: 96 U/L (ref 15–37)
BACTERIA URNS QL MICRO: NEGATIVE /HPF
BASOPHILS # BLD: 0.1 K/UL (ref 0–0.1)
BASOPHILS NFR BLD: 1 % (ref 0–1)
BILIRUB SERPL-MCNC: 1.8 MG/DL (ref 0.2–1)
BILIRUB UR QL: NEGATIVE
BUN SERPL-MCNC: 10 MG/DL (ref 6–20)
BUN/CREAT SERPL: 14 (ref 12–20)
CALCIUM SERPL-MCNC: 9.1 MG/DL (ref 8.5–10.1)
CHLORIDE SERPL-SCNC: 96 MMOL/L (ref 97–108)
CO2 SERPL-SCNC: 31 MMOL/L (ref 21–32)
COLOR UR: ABNORMAL
COMMENT, HOLDF: NORMAL
CREAT SERPL-MCNC: 0.71 MG/DL (ref 0.55–1.02)
DIFFERENTIAL METHOD BLD: ABNORMAL
EOSINOPHIL # BLD: 0.1 K/UL (ref 0–0.4)
EOSINOPHIL NFR BLD: 1 % (ref 0–7)
EPITH CASTS URNS QL MICRO: ABNORMAL /LPF
ERYTHROCYTE [DISTWIDTH] IN BLOOD BY AUTOMATED COUNT: 14.1 % (ref 11.5–14.5)
GLOBULIN SER CALC-MCNC: 4.8 G/DL (ref 2–4)
GLUCOSE BLD STRIP.AUTO-MCNC: 120 MG/DL (ref 65–100)
GLUCOSE SERPL-MCNC: 155 MG/DL (ref 65–100)
GLUCOSE UR STRIP.AUTO-MCNC: NEGATIVE MG/DL
HCT VFR BLD AUTO: 42.4 % (ref 35–47)
HGB BLD-MCNC: 14 G/DL (ref 11.5–16)
HGB UR QL STRIP: ABNORMAL
HYALINE CASTS URNS QL MICRO: ABNORMAL /LPF (ref 0–5)
IMM GRANULOCYTES # BLD AUTO: 0.1 K/UL (ref 0–0.04)
IMM GRANULOCYTES NFR BLD AUTO: 1 % (ref 0–0.5)
KETONES UR QL STRIP.AUTO: ABNORMAL MG/DL
LEUKOCYTE ESTERASE UR QL STRIP.AUTO: NEGATIVE
LIPASE SERPL-CCNC: 133 U/L (ref 73–393)
LYMPHOCYTES # BLD: 1.5 K/UL (ref 0.8–3.5)
LYMPHOCYTES NFR BLD: 18 % (ref 12–49)
MCH RBC QN AUTO: 28.3 PG (ref 26–34)
MCHC RBC AUTO-ENTMCNC: 33 G/DL (ref 30–36.5)
MCV RBC AUTO: 85.8 FL (ref 80–99)
MONOCYTES # BLD: 0.7 K/UL (ref 0–1)
MONOCYTES NFR BLD: 9 % (ref 5–13)
NEUTS SEG # BLD: 5.6 K/UL (ref 1.8–8)
NEUTS SEG NFR BLD: 70 % (ref 32–75)
NITRITE UR QL STRIP.AUTO: NEGATIVE
NRBC # BLD: 0 K/UL (ref 0–0.01)
NRBC BLD-RTO: 0 PER 100 WBC
PH UR STRIP: 6 [PH] (ref 5–8)
PLATELET # BLD AUTO: 249 K/UL (ref 150–400)
PMV BLD AUTO: 11.3 FL (ref 8.9–12.9)
POTASSIUM SERPL-SCNC: 3.5 MMOL/L (ref 3.5–5.1)
PROT SERPL-MCNC: 7.8 G/DL (ref 6.4–8.2)
PROT UR STRIP-MCNC: NEGATIVE MG/DL
RBC # BLD AUTO: 4.94 M/UL (ref 3.8–5.2)
RBC #/AREA URNS HPF: ABNORMAL /HPF (ref 0–5)
SAMPLES BEING HELD,HOLD: NORMAL
SERVICE CMNT-IMP: ABNORMAL
SODIUM SERPL-SCNC: 136 MMOL/L (ref 136–145)
SP GR UR REFRACTOMETRY: 1 (ref 1–1.03)
UA: UC IF INDICATED,UAUC: ABNORMAL
UROBILINOGEN UR QL STRIP.AUTO: 1 EU/DL (ref 0.2–1)
WBC # BLD AUTO: 8 K/UL (ref 3.6–11)
WBC URNS QL MICRO: ABNORMAL /HPF (ref 0–4)

## 2019-09-13 PROCEDURE — 74160 CT ABDOMEN W/CONTRAST: CPT

## 2019-09-13 PROCEDURE — 85025 COMPLETE CBC W/AUTO DIFF WBC: CPT

## 2019-09-13 PROCEDURE — 83690 ASSAY OF LIPASE: CPT

## 2019-09-13 PROCEDURE — 99284 EMERGENCY DEPT VISIT MOD MDM: CPT

## 2019-09-13 PROCEDURE — 74011250636 HC RX REV CODE- 250/636: Performed by: EMERGENCY MEDICINE

## 2019-09-13 PROCEDURE — 99218 HC RM OBSERVATION: CPT

## 2019-09-13 PROCEDURE — 74011636320 HC RX REV CODE- 636/320: Performed by: RADIOLOGY

## 2019-09-13 PROCEDURE — 36415 COLL VENOUS BLD VENIPUNCTURE: CPT

## 2019-09-13 PROCEDURE — 81001 URINALYSIS AUTO W/SCOPE: CPT

## 2019-09-13 PROCEDURE — 65270000029 HC RM PRIVATE

## 2019-09-13 PROCEDURE — 74011250636 HC RX REV CODE- 250/636: Performed by: HOSPITALIST

## 2019-09-13 PROCEDURE — 82962 GLUCOSE BLOOD TEST: CPT

## 2019-09-13 PROCEDURE — 80053 COMPREHEN METABOLIC PANEL: CPT

## 2019-09-13 RX ORDER — SODIUM CHLORIDE 0.9 % (FLUSH) 0.9 %
5-40 SYRINGE (ML) INJECTION EVERY 8 HOURS
Status: DISCONTINUED | OUTPATIENT
Start: 2019-09-13 | End: 2019-09-15 | Stop reason: HOSPADM

## 2019-09-13 RX ORDER — DEXTROSE MONOHYDRATE 100 MG/ML
0-250 INJECTION, SOLUTION INTRAVENOUS AS NEEDED
Status: DISCONTINUED | OUTPATIENT
Start: 2019-09-13 | End: 2019-09-15 | Stop reason: HOSPADM

## 2019-09-13 RX ORDER — ACETAMINOPHEN 325 MG/1
650 TABLET ORAL
Status: DISCONTINUED | OUTPATIENT
Start: 2019-09-13 | End: 2019-09-15 | Stop reason: HOSPADM

## 2019-09-13 RX ORDER — INSULIN LISPRO 100 [IU]/ML
INJECTION, SOLUTION INTRAVENOUS; SUBCUTANEOUS
Status: DISCONTINUED | OUTPATIENT
Start: 2019-09-13 | End: 2019-09-15 | Stop reason: HOSPADM

## 2019-09-13 RX ORDER — MAGNESIUM SULFATE 100 %
4 CRYSTALS MISCELLANEOUS AS NEEDED
Status: DISCONTINUED | OUTPATIENT
Start: 2019-09-13 | End: 2019-09-15 | Stop reason: HOSPADM

## 2019-09-13 RX ORDER — ONDANSETRON 2 MG/ML
4 INJECTION INTRAMUSCULAR; INTRAVENOUS
Status: DISCONTINUED | OUTPATIENT
Start: 2019-09-13 | End: 2019-09-15 | Stop reason: HOSPADM

## 2019-09-13 RX ORDER — FENTANYL CITRATE 50 UG/ML
25 INJECTION, SOLUTION INTRAMUSCULAR; INTRAVENOUS
Status: DISCONTINUED | OUTPATIENT
Start: 2019-09-13 | End: 2019-09-15 | Stop reason: HOSPADM

## 2019-09-13 RX ORDER — SODIUM CHLORIDE 0.9 % (FLUSH) 0.9 %
5-40 SYRINGE (ML) INJECTION AS NEEDED
Status: DISCONTINUED | OUTPATIENT
Start: 2019-09-13 | End: 2019-09-15 | Stop reason: HOSPADM

## 2019-09-13 RX ORDER — SODIUM CHLORIDE 9 MG/ML
50 INJECTION, SOLUTION INTRAVENOUS CONTINUOUS
Status: DISCONTINUED | OUTPATIENT
Start: 2019-09-13 | End: 2019-09-15 | Stop reason: HOSPADM

## 2019-09-13 RX ORDER — TRAMADOL HYDROCHLORIDE 50 MG/1
50 TABLET ORAL
Status: DISCONTINUED | OUTPATIENT
Start: 2019-09-13 | End: 2019-09-15 | Stop reason: HOSPADM

## 2019-09-13 RX ADMIN — SODIUM CHLORIDE 1000 ML: 900 INJECTION, SOLUTION INTRAVENOUS at 18:30

## 2019-09-13 RX ADMIN — SODIUM CHLORIDE 125 ML/HR: 900 INJECTION, SOLUTION INTRAVENOUS at 21:13

## 2019-09-13 RX ADMIN — IOPAMIDOL 100 ML: 755 INJECTION, SOLUTION INTRAVENOUS at 13:26

## 2019-09-13 NOTE — ED PROVIDER NOTES
61 y.o. female with past medical history significant for type 2 DM who presents from private vehicle with chief complaint of gallstones. Pt reports she had a CT with contrast today, which revealed a calculus within the common bile duct measuring 0.7 cm in the head of the pancreas; the common bile duct is minimally distended at 0.9 cm. Pt states she received a phone call from Dr. Garrison Damon (PCP) PTA, who sent Pt to the ED for admission. Pt underwent a cholecystectomy on 08/11/19, performed by Jeremiah Loyd MD. Pt reports N/V and abdominal pain after eating breakfast and lunch. Pt notes vomiting that begins in the \"late afternoon\". Pt reports eating a muffin and a cuties coby for breakfast this morning. Pt notes she was recently started on accelerated metformin. Pt states she has received a colonoscopy in the past, but she is unsure of the provider who performed the colonoscopy. Pt currently denies abdominal pain. Pt denies fever or diarrhea. There are no other acute medical concerns at this time. PCP: Abdoul Vizcarra MD    Note written by Carly Barkley, as dictated by Lele Zamudio MD 5:35 PM      The history is provided by the patient. No  was used.         Past Medical History:   Diagnosis Date    Type 2 diabetes mellitus ([place] [address]        Past Surgical History:   Procedure Laterality Date    HX CHOLECYSTECTOMY      HX LAP CHOLECYSTECTOMY  08/11/2019         Family History:   Problem Relation Age of Onset    Diabetes Mother         type 2    Hypertension Mother     Diabetes Sister     Congenital heart defect Maternal Grandmother        Social History     Socioeconomic History    Marital status:      Spouse name: Not on file    Number of children: Not on file    Years of education: Not on file    Highest education level: Not on file   Occupational History    Not on file   Social Needs    Financial resource strain: Not on file    Food insecurity:     Worry: Not on file     Inability: Not on file    Transportation needs:     Medical: Not on file     Non-medical: Not on file   Tobacco Use    Smoking status: Never Smoker    Smokeless tobacco: Never Used   Substance and Sexual Activity    Alcohol use: Yes     Alcohol/week: 1.0 standard drinks     Types: 1 Glasses of wine per week     Comment: 1-3 drinks per month    Drug use: Never    Sexual activity: Yes     Partners: Male   Lifestyle    Physical activity:     Days per week: Not on file     Minutes per session: Not on file    Stress: Not on file   Relationships    Social connections:     Talks on phone: Not on file     Gets together: Not on file     Attends [employer] service: Not on file     Active member of club or organization: Not on file     Attends meetings of clubs or organizations: Not on file     Relationship status: Not on file    Intimate partner violence:     Fear of current or ex partner: Not on file     Emotionally abused: Not on file     Physically abused: Not on file     Forced sexual activity: Not on file   Other Topics Concern    Not on file   Social History Narrative    Not on file         ALLERGIES: Patient has no known allergies. Review of Systems   Constitutional: Negative for fever. Respiratory: Negative for cough and shortness of breath. Cardiovascular: Negative for chest pain. Gastrointestinal: Positive for nausea and vomiting. Negative for abdominal pain and diarrhea. Genitourinary: Negative for dysuria, flank pain and hematuria. Musculoskeletal: Negative for back pain. Neurological: Negative for headaches. All other systems reviewed and are negative.       Vitals:    09/13/19 1717   BP: 188/79   Pulse: (!) 107   Resp: 19   Temp: 98.6 °F (37 °C)   SpO2: 95%   Weight: 79.8 kg (176 lb)   Height: 5' 2\" (1.575 m)            Physical Exam   Physical Examination: General appearance - alert, well appearing, and in no distress, oriented to person, place, and time and normal appearing weight  Eyes - pupils equal and reactive, extraocular eye movements intact  Neck - supple, no significant adenopathy  Chest - clear to auscultation, no wheezes, rales or rhonchi, symmetric air entry  Heart - normal rate, regular rhythm, normal S1, S2, no murmurs, rubs, clicks or gallops  Abdomen - soft, mild RUQ tenderness, no rebound/guarding/peritoneal signs, nondistended, no masses or organomegaly  Back exam - full range of motion, no tenderness, palpable spasm or pain on motion  Neurological - alert, oriented, normal speech, no focal findings or movement disorder noted  Musculoskeletal - no joint tenderness, deformity or swelling  Extremities - peripheral pulses normal, no pedal edema, no clubbing or cyanosis  Skin - normal coloration and turgor, no rashes, no suspicious skin lesions noted  MDM  Number of Diagnoses or Management Options  Retained gallstones following laparoscopic cholecystectomy:      Amount and/or Complexity of Data Reviewed  Clinical lab tests: ordered and reviewed  Tests in the radiology section of CPT®: reviewed  Decide to obtain previous medical records or to obtain history from someone other than the patient: yes  Obtain history from someone other than the patient: yes (spouse)  Review and summarize past medical records: yes  Discuss the patient with other providers: yes (Surgery, GI, hospitalist)  Independent visualization of images, tracings, or specimens: yes    Patient Progress  Patient progress: improved         Procedures       CONSULT NOTE:  5:48 PM Ricardo Vogel MD spoke with Dr. Shila Johnson, Consult for Surgery. Discussed available diagnostic tests and clinical findings. Dr. Shila Johnson recommends GI see Pt for an ERCP. CONSULT NOTE:  6:29 PM Ricardo Vogel MD spoke with Dr. Kassandra Corarles, Consult for Gastroenterology. Discussed available diagnostic tests and clinical findings. Dr. Kassandra Corrales recommends admission.     CONSULT NOTE:  7:09 PM Ricardo Vogel MD spoke with  Gino, Consult for Hospitalist.  Discussed available diagnostic tests and clinical findings. Dr. Annika Saleh will admit Pt.

## 2019-09-13 NOTE — PROGRESS NOTES
Spoke with Steve Rodriguez in [address] [place] [address] office about positive finding of CT scan

## 2019-09-13 NOTE — TELEPHONE ENCOUNTER
Call patient with CT findings. Patient notes she continues to get very sick after eating. Currently her abdominal pain is mild. I discussed findings of stone in her pancreas and she likely needs to be admitted for evaluation and possible ERCP. Patient voiced understanding. I did call ER at [facility] to give them a heads up about her.

## 2019-09-13 NOTE — ED TRIAGE NOTES
Pt is s/p cholecystectomy 8/11/19. Was sent here by Libby for admission after outpatient CT w/contrast test indicated that pt has a gallstone that remains and is blocking the bile duct.

## 2019-09-13 NOTE — PROGRESS NOTES
[facility]: MED RECONCILIATION    Comments/Recommendations:   Med rec performed via interview with patient. Patient denies any other RX or OTC medication outside of metformin and valsartan. Allergies: Patient has no known allergies. Prior to Admission Medications:     Medication Documentation Review Audit       Reviewed by RANDALL OntiverosD (Pharmacist) on 09/13/19 at 1756      Medication Sig Documenting Provider Last Dose Status Taking?   glucose blood VI test strips (ASCENSIA AUTODISC VI, ONE TOUCH ULTRA TEST VI) strip Check fasting blood sugars daily. Contour Next one. Laron Blizzard, MD  Active    lancets misc Check fasting blood sugars daily. Laron Blizzard, MD  Active    metFORMIN (GLUCOPHAGE) 500 mg tablet Take 1 Tab by mouth two (2) times daily (with meals). Laron Blizzard, MD 9/11/2019 Active Yes   ONE TOUCH DELICA 33 gauge misc CHECK FASTING BLOOD SUGARS DAILY Provider, Historical  Active    ONETOUCH ULTRAMINI monitoring kit use as directed Laron Blizzard, MD  Active    valsartan (DIOVAN) 80 mg tablet Take 1 Tab by mouth daily.  Laron Blizzard, MD 9/6/2019 Unknown time Active Yes                        GIULIANO Lin   Contact: [phone]

## 2019-09-13 NOTE — PROGRESS NOTES
GI note    CT with choledocholithiasis  Bilirubin 3, repeat pending  Risk for pancreatitis, cholangitis. I'd suggest admission for observation. I've called OR, and schedule already occupied in time I have tomorrow so she has a slot Sunday 8am for ERCP. Would ask hospitalist to admit  Full consult to follow.   Agusto Rizo MD

## 2019-09-14 LAB
GLUCOSE BLD STRIP.AUTO-MCNC: 113 MG/DL (ref 65–100)
GLUCOSE BLD STRIP.AUTO-MCNC: 128 MG/DL (ref 65–100)
GLUCOSE BLD STRIP.AUTO-MCNC: 136 MG/DL (ref 65–100)
GLUCOSE BLD STRIP.AUTO-MCNC: 184 MG/DL (ref 65–100)
SERVICE CMNT-IMP: ABNORMAL

## 2019-09-14 PROCEDURE — 65270000029 HC RM PRIVATE

## 2019-09-14 PROCEDURE — 74011250636 HC RX REV CODE- 250/636: Performed by: HOSPITALIST

## 2019-09-14 PROCEDURE — 74011250637 HC RX REV CODE- 250/637: Performed by: INTERNAL MEDICINE

## 2019-09-14 PROCEDURE — 99218 HC RM OBSERVATION: CPT

## 2019-09-14 PROCEDURE — 82962 GLUCOSE BLOOD TEST: CPT

## 2019-09-14 RX ORDER — CARVEDILOL 6.25 MG/1
6.25 TABLET ORAL 2 TIMES DAILY WITH MEALS
Status: DISCONTINUED | OUTPATIENT
Start: 2019-09-14 | End: 2019-09-15 | Stop reason: HOSPADM

## 2019-09-14 RX ADMIN — CARVEDILOL 6.25 MG: 6.25 TABLET, FILM COATED ORAL at 18:35

## 2019-09-14 RX ADMIN — SODIUM CHLORIDE 125 ML/HR: 900 INJECTION, SOLUTION INTRAVENOUS at 05:16

## 2019-09-14 RX ADMIN — CARVEDILOL 6.25 MG: 6.25 TABLET, FILM COATED ORAL at 13:06

## 2019-09-14 NOTE — H&P
HISTORY AND PHYSICAL      PCP: Ramona Knapp MD  History source: the patient      CC: abnormal CT scan      HPI: 61 y.o lady with type 2 DM who is s/p laparoscopic cholecystectomy on 8/11/19 presents from her surgeon's office to the ER with an abnormal CT scan of the abdomen, which showed a common bile duct stone. She reports that since her cholecystectomy, she's had mild nausea and anorexia. She saw her surgeon for follow up and her LFTs were noted to be increasing, so the CT was done revealing the above. She denies any abdominal pain, fever, vomiting, diarrhea, constipation, or jaundice, but notes that her urine has been very dark lately. PMH/PSH:  Past Medical History:   Diagnosis Date    Type 2 diabetes mellitus ([facility] [address]      Past Surgical History:   Procedure Laterality Date    HX CHOLECYSTECTOMY      HX LAP CHOLECYSTECTOMY  08/11/2019       Home meds:   Prior to Admission medications    Medication Sig Start Date End Date Taking? Authorizing Provider   metFORMIN (GLUCOPHAGE) 500 mg tablet Take 1 Tab by mouth two (2) times daily (with meals). 9/11/19  Yes Ramona Knapp MD   valsartan (DIOVAN) 80 mg tablet Take 1 Tab by mouth daily. 8/22/19  Yes MD Mayank Starr 42 33 gauge misc CHECK FASTING BLOOD SUGARS DAILY 8/22/19   Provider, Brennen   lancets misc Check fasting blood sugars daily. 8/22/19   Ramona Knapp MD   glucose blood VI test strips (ASCENSIA AUTODISC VI, ONE TOUCH ULTRA TEST VI) strip Check fasting blood sugars daily.  Contour Next one. 8/22/19   MD Deedee Starr [place] monitoring kit use as directed 8/22/19   Ramona Knapp MD       Allergies:  No Known Allergies    FH:  Family History   Problem Relation Age of Onset    Diabetes Mother         type 2    Hypertension Mother     Diabetes Sister     Congenital heart defect Maternal Grandmother        SH:  Social History     Tobacco Use    Smoking status: Never Smoker    Smokeless tobacco: Never Used   Substance Use Topics    Alcohol use: Yes     Alcohol/week: 1.0 standard drinks     Types: 1 Glasses of wine per week     Comment: 1-3 drinks per month       ROS: A comprehensive review of systems was negative except for that written in the HPI. PHYSICAL EXAM:  Visit Vitals  /79 (BP 1 Location: Right arm, BP Patient Position: Sitting)   Pulse (!) 107   Temp 98.6 °F (37 °C)   Resp 19   Ht 5' 2\" (1.575 m)   Wt 79.8 kg (176 lb)   SpO2 95%   BMI 32.19 kg/m²       Gen: NAD, non-toxic  HEENT: anicteric sclerae, normal conjunctiva, oropharynx clear, MM moist  Neck: supple, trachea midline, no adenopathy  Heart: RRR, no MRG, no JVD, no peripheral edema  Lungs: CTA b/l, non-labored respirations  Abd: soft, NT, ND, BS+, laparoscopy scars well-healed  Extr: warm  Skin: dry, no rash  Neuro: CN II-XII grossly intact, normal speech, moves all extremities  Psych: normal mood, appropriate affect      Labs/Imaging:  Recent Results (from the past 24 hour(s))   CBC WITH AUTOMATED DIFF    Collection Time: 09/13/19  6:07 PM   Result Value Ref Range    WBC 8.0 3.6 - 11.0 K/uL    RBC 4.94 3.80 - 5.20 M/uL    HGB 14.0 11.5 - 16.0 g/dL    HCT 42.4 35.0 - 47.0 %    MCV 85.8 80.0 - 99.0 FL    MCH 28.3 26.0 - 34.0 PG    MCHC 33.0 30.0 - 36.5 g/dL    RDW 14.1 11.5 - 14.5 %    PLATELET 321 566 - 130 K/uL    MPV 11.3 8.9 - 12.9 FL    NRBC 0.0 0  WBC    ABSOLUTE NRBC 0.00 0.00 - 0.01 K/uL    NEUTROPHILS 70 32 - 75 %    LYMPHOCYTES 18 12 - 49 %    MONOCYTES 9 5 - 13 %    EOSINOPHILS 1 0 - 7 %    BASOPHILS 1 0 - 1 %    IMMATURE GRANULOCYTES 1 (H) 0.0 - 0.5 %    ABS. NEUTROPHILS 5.6 1.8 - 8.0 K/UL    ABS. LYMPHOCYTES 1.5 0.8 - 3.5 K/UL    ABS. MONOCYTES 0.7 0.0 - 1.0 K/UL    ABS. EOSINOPHILS 0.1 0.0 - 0.4 K/UL    ABS. BASOPHILS 0.1 0.0 - 0.1 K/UL    ABS. IMM.  GRANS. 0.1 (H) 0.00 - 0.04 K/UL    DF AUTOMATED     METABOLIC PANEL, COMPREHENSIVE    Collection Time: 09/13/19  6:07 PM   Result Value Ref Range Sodium 136 136 - 145 mmol/L    Potassium 3.5 3.5 - 5.1 mmol/L    Chloride 96 (L) 97 - 108 mmol/L    CO2 31 21 - 32 mmol/L    Anion gap 9 5 - 15 mmol/L    Glucose 155 (H) 65 - 100 mg/dL    BUN 10 6 - 20 MG/DL    Creatinine 0.71 0.55 - 1.02 MG/DL    BUN/Creatinine ratio 14 12 - 20      GFR est AA >60 >60 ml/min/1.73m2    GFR est non-AA >60 >60 ml/min/1.73m2    Calcium 9.1 8.5 - 10.1 MG/DL    Bilirubin, total 1.8 (H) 0.2 - 1.0 MG/DL    ALT (SGPT) 188 (H) 12 - 78 U/L    AST (SGOT) 96 (H) 15 - 37 U/L    Alk. phosphatase 459 (H) 45 - 117 U/L    Protein, total 7.8 6.4 - 8.2 g/dL    Albumin 3.0 (L) 3.5 - 5.0 g/dL    Globulin 4.8 (H) 2.0 - 4.0 g/dL    A-G Ratio 0.6 (L) 1.1 - 2.2     LIPASE    Collection Time: 09/13/19  6:07 PM   Result Value Ref Range    Lipase 133 73 - 393 U/L   URINALYSIS W/ REFLEX CULTURE    Collection Time: 09/13/19  6:07 PM   Result Value Ref Range    Color YELLOW/STRAW      Appearance CLEAR CLEAR      Specific gravity 1.005 1.003 - 1.030      pH (UA) 6.0 5.0 - 8.0      Protein NEGATIVE  NEG mg/dL    Glucose NEGATIVE  NEG mg/dL    Ketone TRACE (A) NEG mg/dL    Bilirubin NEGATIVE  NEG      Blood TRACE (A) NEG      Urobilinogen 1.0 0.2 - 1.0 EU/dL    Nitrites NEGATIVE  NEG      Leukocyte Esterase NEGATIVE  NEG      WBC 0-4 0 - 4 /hpf    RBC 0-5 0 - 5 /hpf    Epithelial cells FEW FEW /lpf    Bacteria NEGATIVE  NEG /hpf    UA:UC IF INDICATED CULTURE NOT INDICATED BY UA RESULT CNI      Hyaline cast 0-2 0 - 5 /lpf   SAMPLES BEING HELD    Collection Time: 09/13/19  6:07 PM   Result Value Ref Range    SAMPLES BEING HELD RED,BLUE,GOLD     COMMENT        Add-on orders for these samples will be processed based on acceptable specimen integrity and analyte stability, which may vary by analyte.        Recent Labs     09/13/19  1807 09/11/19  1002   WBC 8.0 11.3*   HGB 14.0 14.7   HCT 42.4 45.1    241     Recent Labs     09/13/19 1807 09/11/19  1002    136   K 3.5 4.2   CL 96* 92*   CO2 31 26   BUN 10 10 CREA 0.71 0.92   * 202*   CA 9.1 9.6     Recent Labs     09/13/19  1807 09/11/19  1002   SGOT 96* 148*   * 236*   * 492*   TBILI 1.8* 2.7*   TP 7.8 7.2   ALB 3.0* 4.0   GLOB 4.8*  --    LPSE 133  --        No results for input(s): CPK, CKNDX, TROIQ in the last 72 hours. No lab exists for component: CPKMB    No results for input(s): INR, PTP, APTT in the last 72 hours. No lab exists for component: INREXT     No results for input(s): PH, PCO2, PO2 in the last 72 hours. Ct Abd W Cont    Result Date: 9/13/2019  IMPRESSION: 1. The patient is status post cholecystectomy with stranding and inflammatory change in the gallbladder fossa. Recommend clinical correlation for surgical timing. 2. There is a calculus within the common bile duct measuring 0.7 cm in the head of the pancreas. The common bile duct is minimally distended at 0.9 cm. 3. Incidental findings as above. . Findings of this exam were discussed with the technologist by Dr. Mohsen Roman by telephone at approximately, 9/13/2019 3:50 PM. The technologist will contact the referring doctor's office for patient disposition.  [phone]            Assessment & Plan:     Choledocholithiasis:  -consult GI for ERCP  -NPO at midnight  -IV fluids  -no s/s of cholangitis  -general surgery recs noted    Elevated LFTs: due to the above  -monitor; expect improvement post-ERCP    Type 2 DM:  -hold metformin  -SSI/POC checks    DVT ppx: SCDs until ERCP  Code status: full  Disposition: home when ready    Signed By: Leta Levi MD     September 13, 2019

## 2019-09-14 NOTE — CONSULTS
Ellie Mackay. Marcos Pan MD  [phone] office  [phone] voicemail   Gastroenterology Consultation Note      Admit Date: 9/13/2019  Consult Date: 9/14/2019   I greatly appreciate your asking me to see Allen Rojas, thank you very much for the opportunity to participate in her care. Narrative Assessment and Plan   · Elevated liver enzymes  · Elevated bilirubin  · Abnormal ct  · Choledocholithiasis    ERCP is indicated, we reviewed those indications, risks, benefits and alternatives and she has asked we proceed. Weekend scheduling of that procedure is always difficult, and since the OR is occpied with numerous cases today already I've put her on the schedule for ERCP tomorrow at 0800. She would like to try some PO today which is not unreasonable. Subjective:     Chief Complaint: Nausea    History of Present Illness: acute cholecystitis leads to cholecystectomy last month Aug 11. Since then lingering symptoms of nausea after meals and intermittent tea/cola colored urine. No jaundice no fever no chills and not much in the way of abdominal pain. Seen by PCP for routine care and ordered labs which she didn't do until she had a severe case of n/v after meal and noted elevated liver enzymes, bilirubin 2.7 and so CT was ordered showing CBD stone. PCP:  Jas Arredondo MD    Past Medical History:   Diagnosis Date    Type 2 diabetes mellitus ([address]         Past Surgical History:   Procedure Laterality Date    HX CHOLECYSTECTOMY      HX LAP CHOLECYSTECTOMY  08/11/2019       Social History     Tobacco Use    Smoking status: Never Smoker    Smokeless tobacco: Never Used   Substance Use Topics    Alcohol use:  Yes     Alcohol/week: 1.0 standard drinks     Types: 1 Glasses of wine per week     Comment: 1-3 drinks per month        Family History   Problem Relation Age of Onset    Diabetes Mother         type 2    Hypertension Mother     Diabetes Sister     Congenital heart defect Maternal Grandmother         No Known Allergies         Home Medications:  Prior to Admission Medications   Prescriptions Last Dose Informant Patient Reported? Taking? ONE TOUCH DELICA 33 gauge misc  Self Yes No   Sig: CHECK FASTING BLOOD SUGARS DAILY   ONETOUCH ULTRAMINI monitoring kit  Self No No   Sig: use as directed   glucose blood VI test strips (ASCENSIA AUTODISC VI, ONE TOUCH ULTRA TEST VI) strip  Self No No   Sig: Check fasting blood sugars daily. Contour Next one.   lancets misc  Self No No   Sig: Check fasting blood sugars daily. metFORMIN (GLUCOPHAGE) 500 mg tablet 9/11/2019 Self No Yes   Sig: Take 1 Tab by mouth two (2) times daily (with meals). valsartan (DIOVAN) 80 mg tablet 9/6/2019 at Unknown time Self No Yes   Sig: Take 1 Tab by mouth daily.       Facility-Administered Medications: None       Hospital Medications:  Current Facility-Administered Medications   Medication Dose Route Frequency    sodium chloride (NS) flush 5-40 mL  5-40 mL IntraVENous Q8H    sodium chloride (NS) flush 5-40 mL  5-40 mL IntraVENous PRN    acetaminophen (TYLENOL) tablet 650 mg  650 mg Oral Q4H PRN    ondansetron (ZOFRAN) injection 4 mg  4 mg IntraVENous Q4H PRN    fentaNYL citrate (PF) injection 25 mcg  25 mcg IntraVENous Q4H PRN    traMADol (ULTRAM) tablet 50 mg  50 mg Oral Q6H PRN    0.9% sodium chloride infusion  125 mL/hr IntraVENous CONTINUOUS    insulin lispro (HUMALOG) injection   SubCUTAneous AC&HS    glucose chewable tablet 16 g  4 Tab Oral PRN    glucagon (GLUCAGEN) injection 1 mg  1 mg IntraMUSCular PRN    dextrose 10% infusion 0-250 mL  0-250 mL IntraVENous PRN       Review of Systems: Admission ROS by Darius Pederson MD from 9/13/2019 were reviewed with the patient and changes (other than per HPI) include: none      Objective:     Physical Exam:  Visit Vitals  BP (!) 164/108 (BP 1 Location: Left arm, BP Patient Position: At rest)   Pulse 83   Temp 97.7 °F (36.5 °C)   Resp 18   Ht 5' 2\" (1.575 m)   Wt 79.8 kg (176 lb)   SpO2 96%   Breastfeeding? No   BMI 32.19 kg/m²     SpO2 Readings from Last 6 Encounters:   09/14/19 96%   09/11/19 98%   08/22/19 97%   08/15/19 93%            Intake/Output Summary (Last 24 hours) at 9/14/2019 1383  Last data filed at 9/14/2019 0437  Gross per 24 hour   Intake 1050 ml   Output    Net 1050 ml      General: no distress, comfortable  Skin:  No rash or palpable dermatologic mass lesions  HEENT: Pupils equal, sclera anicteric, oropharynx with no gross lesions  Cardiovascular: No abnormal audible heart sounds, well perfused, no edema  Respiratory:  No abnormal audible breath sounds, normal respiratory effort, no throacic deformity  GI:  Abdomen nondistended, nontender, no mass, no free fluid, no rebound or guarding. Musculoskeletal:  No skeletal deformity nor acute arthritis noted. Neurological:  Motor and sensory function intact in upper extremeties  Psychiatric:  Normal affect, memory intact, appears to have insight into current illness  Lymphatic:  No cervical, supraclavicular, or periumbilic lymphadenopathy    Laboratory:    Recent Results (from the past 24 hour(s))   CBC WITH AUTOMATED DIFF    Collection Time: 09/13/19  6:07 PM   Result Value Ref Range    WBC 8.0 3.6 - 11.0 K/uL    RBC 4.94 3.80 - 5.20 M/uL    HGB 14.0 11.5 - 16.0 g/dL    HCT 42.4 35.0 - 47.0 %    MCV 85.8 80.0 - 99.0 FL    MCH 28.3 26.0 - 34.0 PG    MCHC 33.0 30.0 - 36.5 g/dL    RDW 14.1 11.5 - 14.5 %    PLATELET 661 956 - 242 K/uL    MPV 11.3 8.9 - 12.9 FL    NRBC 0.0 0  WBC    ABSOLUTE NRBC 0.00 0.00 - 0.01 K/uL    NEUTROPHILS 70 32 - 75 %    LYMPHOCYTES 18 12 - 49 %    MONOCYTES 9 5 - 13 %    EOSINOPHILS 1 0 - 7 %    BASOPHILS 1 0 - 1 %    IMMATURE GRANULOCYTES 1 (H) 0.0 - 0.5 %    ABS. NEUTROPHILS 5.6 1.8 - 8.0 K/UL    ABS. LYMPHOCYTES 1.5 0.8 - 3.5 K/UL    ABS. MONOCYTES 0.7 0.0 - 1.0 K/UL    ABS. EOSINOPHILS 0.1 0.0 - 0.4 K/UL    ABS. BASOPHILS 0.1 0.0 - 0.1 K/UL    ABS. IMM. GRANS. 0.1 (H) 0.00 - 0.04 K/UL    DF AUTOMATED     METABOLIC PANEL, COMPREHENSIVE    Collection Time: 09/13/19  6:07 PM   Result Value Ref Range    Sodium 136 136 - 145 mmol/L    Potassium 3.5 3.5 - 5.1 mmol/L    Chloride 96 (L) 97 - 108 mmol/L    CO2 31 21 - 32 mmol/L    Anion gap 9 5 - 15 mmol/L    Glucose 155 (H) 65 - 100 mg/dL    BUN 10 6 - 20 MG/DL    Creatinine 0.71 0.55 - 1.02 MG/DL    BUN/Creatinine ratio 14 12 - 20      GFR est AA >60 >60 ml/min/1.73m2    GFR est non-AA >60 >60 ml/min/1.73m2    Calcium 9.1 8.5 - 10.1 MG/DL    Bilirubin, total 1.8 (H) 0.2 - 1.0 MG/DL    ALT (SGPT) 188 (H) 12 - 78 U/L    AST (SGOT) 96 (H) 15 - 37 U/L    Alk. phosphatase 459 (H) 45 - 117 U/L    Protein, total 7.8 6.4 - 8.2 g/dL    Albumin 3.0 (L) 3.5 - 5.0 g/dL    Globulin 4.8 (H) 2.0 - 4.0 g/dL    A-G Ratio 0.6 (L) 1.1 - 2.2     LIPASE    Collection Time: 09/13/19  6:07 PM   Result Value Ref Range    Lipase 133 73 - 393 U/L   URINALYSIS W/ REFLEX CULTURE    Collection Time: 09/13/19  6:07 PM   Result Value Ref Range    Color YELLOW/STRAW      Appearance CLEAR CLEAR      Specific gravity 1.005 1.003 - 1.030      pH (UA) 6.0 5.0 - 8.0      Protein NEGATIVE  NEG mg/dL    Glucose NEGATIVE  NEG mg/dL    Ketone TRACE (A) NEG mg/dL    Bilirubin NEGATIVE  NEG      Blood TRACE (A) NEG      Urobilinogen 1.0 0.2 - 1.0 EU/dL    Nitrites NEGATIVE  NEG      Leukocyte Esterase NEGATIVE  NEG      WBC 0-4 0 - 4 /hpf    RBC 0-5 0 - 5 /hpf    Epithelial cells FEW FEW /lpf    Bacteria NEGATIVE  NEG /hpf    UA:UC IF INDICATED CULTURE NOT INDICATED BY UA RESULT CNI      Hyaline cast 0-2 0 - 5 /lpf   SAMPLES BEING HELD    Collection Time: 09/13/19  6:07 PM   Result Value Ref Range    SAMPLES BEING HELD RED,BLUE,GOLD     COMMENT        Add-on orders for these samples will be processed based on acceptable specimen integrity and analyte stability, which may vary by analyte.    GLUCOSE, POC    Collection Time: 09/13/19  9:16 PM   Result Value Ref Range Glucose (POC) 120 (H) 65 - 100 mg/dL    Performed by Nayeli Rice  PCT    GLUCOSE, POC    Collection Time: 09/14/19  7:02 AM   Result Value Ref Range    Glucose (POC) 113 (H) 65 - 100 mg/dL    Performed by Nayeli Rice  PCT          Assessment/Plan:     Principal Problem:    Choledocholithiasis (9/13/2019)         See above narrative for full detail.

## 2019-09-14 NOTE — ED NOTES
TRANSFER - OUT REPORT:    Verbal report given to MARIA ELENA Reilly (name) on Jeanie Singh  being transferred to 5th floor (unit) for routine progression of care       Report consisted of patients Situation, Background, Assessment and   Recommendations(SBAR). Information from the following report(s) SBAR, ED Summary and Intake/Output was reviewed with the receiving nurse. Lines:       Opportunity for questions and clarification was provided.       Patient transported with:   [employer]

## 2019-09-14 NOTE — ED NOTES
Pt Throughput: Charge Nurse on 5th floor  made aware of patient's bed assignment, room 506. Taz Tang RN  Emergency Dept Charge RN.

## 2019-09-14 NOTE — PROGRESS NOTES
Hospitalist Progress Note      NAME: Ally Hamilton   :  1960  MRM:  [id number]    Date/Time: 2019  9:00 AM       Assessment / Plan:   61 y.o lady with type 2 DM who is s/p laparoscopic cholecystectomy on 19 presents from her surgeon's office to the ER with an abnormal CT scan of the abdomen, which showed a common bile duct stone. Choledocholithiasis:  -consult GI for ERCP, likely on 9/15  -IV fluids  -general surgery c/s, recs noted     Elevated LFTs: due to the above  -monitor; expect improvement post-ERCP     Type 2 DM:  -hold metformin  -SSI/POC checks    HTN  No hx, likely situational  - coreg for now       DVT ppx: SCDs until ERCP  Code status: full  Disposition: home when ready       Subjective:     Chief Complaint:  CBD gallstone        ROS:  (bold if positive, if negative)      Tolerating PT  Tolerating Diet          Objective:       Vitals:          Last 24hrs VS reviewed since prior progress note. Most recent are:    Visit Vitals  BP (!) 164/108 (BP 1 Location: Left arm, BP Patient Position: At rest)   Pulse 83   Temp 97.7 °F (36.5 °C)   Resp 18   Ht 5' 2\" (1.575 m)   Wt 79.8 kg (176 lb)   SpO2 96%   Breastfeeding?  No   BMI 32.19 kg/m²     SpO2 Readings from Last 6 Encounters:   19 96%   19 98%   19 97%   08/15/19 93%            Intake/Output Summary (Last 24 hours) at 2019 0900  Last data filed at 2019 0437  Gross per 24 hour   Intake 1050 ml   Output    Net 1050 ml          Exam:     Physical Exam:    Gen:  Well-developed, well-nourished, in no acute distress  HEENT:  Pink conjunctivae, PERRL, hearing intact to voice, moist mucous membranes  Neck:  Supple, without masses, thyroid non-tender  Resp:  No accessory muscle use, clear breath sounds without wheezes, rales or rhonchi  Card:  No murmurs, normal S1, S2 without thrills, bruits or peripheral edema  Abd:  Soft, non-tender, non-distended, normoactive bowel sounds are present  Musc:  No cyanosis, clubbing or edema  Skin:  No rashes or ulcers, skin turgor is good  Neuro:  Cranial nerves 3-12 are grossly intact, follows commands appropriately  Psych:  Good insight, oriented to person, place and time, alert       Telemetry reviewed:   normal sinus rhythm    Medications Reviewed: (see below)    Lab Data Reviewed: (see below)    ______________________________________________________________________    Medications:     Current Facility-Administered Medications   Medication Dose Route Frequency    sodium chloride (NS) flush 5-40 mL  5-40 mL IntraVENous Q8H    sodium chloride (NS) flush 5-40 mL  5-40 mL IntraVENous PRN    acetaminophen (TYLENOL) tablet 650 mg  650 mg Oral Q4H PRN    ondansetron (ZOFRAN) injection 4 mg  4 mg IntraVENous Q4H PRN    fentaNYL citrate (PF) injection 25 mcg  25 mcg IntraVENous Q4H PRN    traMADol (ULTRAM) tablet 50 mg  50 mg Oral Q6H PRN    0.9% sodium chloride infusion  125 mL/hr IntraVENous CONTINUOUS    insulin lispro (HUMALOG) injection   SubCUTAneous AC&HS    glucose chewable tablet 16 g  4 Tab Oral PRN    glucagon (GLUCAGEN) injection 1 mg  1 mg IntraMUSCular PRN    dextrose 10% infusion 0-250 mL  0-250 mL IntraVENous PRN            Lab Review:     Recent Labs     09/13/19  1807 09/11/19  1002   WBC 8.0 11.3*   HGB 14.0 14.7   HCT 42.4 45.1    241     Recent Labs     09/13/19  1807 09/11/19  1002    136   K 3.5 4.2   CL 96* 92*   CO2 31 26   * 202*   BUN 10 10   CREA 0.71 0.92   CA 9.1 9.6   ALB 3.0* 4.0   TBILI 1.8* 2.7*   SGOT 96* 148*   * 236*     Lab Results   Component Value Date/Time    Glucose (POC) 113 (H) 09/14/2019 07:02 AM    Glucose (POC) 120 (H) 09/13/2019 09:16 PM    Glucose (POC) 197 (H) 08/15/2019 10:56 AM    Glucose (POC) 141 (H) 08/15/2019 06:48 AM    Glucose (POC) 159 (H) 08/14/2019 09:34 PM         Total time spent with patient: 30 Minutes                  Care Plan discussed with: Patient    Code Status: Full    Prophylaxis: SCD's    Disposition:  Home w/Family           ___________________________________________________    Attending Physician: Eliseo Zayas MD

## 2019-09-14 NOTE — PROGRESS NOTES
Bedside and Verbal shift change report given to Jean Marie Kennedy RN (oncoming nurse) by Kerri Mccarty RN (offgoing nurse). Report included the following information SBAR, Kardex, Intake/Output, MAR and Recent Results.

## 2019-09-14 NOTE — PROGRESS NOTES
General Surgery Progress Note      S: ERCP scheduled for 9/15/19 at 8 AM. Denies abdominal pain or nausea. Patient Vitals for the past 24 hrs:   Temp Pulse Resp BP SpO2   09/14/19 0750 97.7 °F (36.5 °C) 83 18 (!) 164/108 96 %   09/14/19 0305 98 °F (36.7 °C) 83 18 144/76 99 %   09/13/19 2305 97.9 °F (36.6 °C) 89 19 161/79 98 %   09/13/19 2113 97.9 °F (36.6 °C) 97 18 187/49 96 %   09/13/19 2030 98.6 °F (37 °C) 90 18 145/87 93 %   09/13/19 1717 98.6 °F (37 °C) (!) 107 19 188/79 95 %         Date 09/13/19 0700 - 09/14/19 0659 09/14/19 0700 - 09/15/19 0659   Shift 3653-3539 2795-1309 24 Hour Total 4830-5177 9049-2481 24 Hour Total   INTAKE   P.O.  50 50        P. O.  50 50      I. V.(mL/kg/hr)  1000(1) 1000(0.5)        Volume (sodium chloride 0.9 % bolus infusion 1,000 mL)  1000 1000      Shift Total(mL/kg)  1050(13.2) 5753(02.7)      OUTPUT   Urine(mL/kg/hr)           Urine Occurrence(s)  1 x 1 x      Shift Total(mL/kg)         NET  1050 1050      Weight (kg) 79.8 79.8 79.8 79.8 79.8 79.8           Physical Exam:      General: NAD,A&Ox3  Skin: no jaundice  Resp: CTAB  CV: RRR  Abdomen: soft, non-tender, non-distended, well healed laparoscopic incisions without hernia  Extremity: warm, no edema    Lab Results   Component Value Date/Time    WBC 8.0 09/13/2019 06:07 PM    HGB 14.0 09/13/2019 06:07 PM    HCT 42.4 09/13/2019 06:07 PM    PLATELET 512 24/46/2392 06:07 PM    MCV 85.8 09/13/2019 06:07 PM       Lab Results   Component Value Date/Time    Sodium 136 09/13/2019 06:07 PM    Potassium 3.5 09/13/2019 06:07 PM    Chloride 96 (L) 09/13/2019 06:07 PM    CO2 31 09/13/2019 06:07 PM    Anion gap 9 09/13/2019 06:07 PM    Glucose 155 (H) 09/13/2019 06:07 PM    BUN 10 09/13/2019 06:07 PM    Creatinine 0.71 09/13/2019 06:07 PM    BUN/Creatinine ratio 14 09/13/2019 06:07 PM    GFR est AA >60 09/13/2019 06:07 PM    GFR est non-AA >60 09/13/2019 06:07 PM    Calcium 9.1 09/13/2019 06:07 PM        No results found for: INR, PTMR, PTP, PT1, PT2        A/P:  Retained common bile duct stone    Trend CMP  ERCP 9/15/19  Diet per GI  Phillip Loomis MD

## 2019-09-14 NOTE — PROGRESS NOTES
TRANSFER - IN REPORT:    Verbal report received from [place], RN(name) on Lane Flores  being received from ED(unit) for routine progression of care      Report consisted of patients Situation, Background, Assessment and   Recommendations(SBAR). Information from the following report(s) SBAR, Kardex, ED Summary, Intake/Output, MAR and Recent Results was reviewed with the receiving nurse. Opportunity for questions and clarification was provided. Assessment completed upon patients arrival to unit and care assumed.

## 2019-09-15 ENCOUNTER — APPOINTMENT (OUTPATIENT)
Dept: GENERAL RADIOLOGY | Age: 59
DRG: 446 | End: 2019-09-15
Attending: SPECIALIST
Payer: COMMERCIAL

## 2019-09-15 ENCOUNTER — ANESTHESIA (OUTPATIENT)
Dept: SURGERY | Age: 59
DRG: 446 | End: 2019-09-15
Payer: COMMERCIAL

## 2019-09-15 VITALS
HEIGHT: 62 IN | OXYGEN SATURATION: 96 % | SYSTOLIC BLOOD PRESSURE: 132 MMHG | RESPIRATION RATE: 16 BRPM | BODY MASS INDEX: 32.39 KG/M2 | HEART RATE: 69 BPM | DIASTOLIC BLOOD PRESSURE: 68 MMHG | TEMPERATURE: 97.6 F | WEIGHT: 176 LBS

## 2019-09-15 PROBLEM — E11.9 DM (DIABETES MELLITUS), TYPE 2 (HCC): Status: ACTIVE | Noted: 2019-09-15

## 2019-09-15 LAB
GLUCOSE BLD STRIP.AUTO-MCNC: 131 MG/DL (ref 65–100)
GLUCOSE BLD STRIP.AUTO-MCNC: 214 MG/DL (ref 65–100)
SERVICE CMNT-IMP: ABNORMAL
SERVICE CMNT-IMP: ABNORMAL

## 2019-09-15 PROCEDURE — 99218 HC RM OBSERVATION: CPT

## 2019-09-15 PROCEDURE — 77030026438 HC STYL ET INTUB CARD -A: Performed by: NURSE ANESTHETIST, CERTIFIED REGISTERED

## 2019-09-15 PROCEDURE — 74011250636 HC RX REV CODE- 250/636: Performed by: NURSE ANESTHETIST, CERTIFIED REGISTERED

## 2019-09-15 PROCEDURE — 77030018846 HC SOL IRR STRL H20 ICUM -A: Performed by: SPECIALIST

## 2019-09-15 PROCEDURE — 74011636637 HC RX REV CODE- 636/637: Performed by: HOSPITALIST

## 2019-09-15 PROCEDURE — 76210000006 HC OR PH I REC 0.5 TO 1 HR: Performed by: SPECIALIST

## 2019-09-15 PROCEDURE — 77030010104 HC SEAL PRT ENDOSC BYRN -B: Performed by: SPECIALIST

## 2019-09-15 PROCEDURE — 74011250636 HC RX REV CODE- 250/636: Performed by: ANESTHESIOLOGY

## 2019-09-15 PROCEDURE — 77030007288 HC DEV LOK BILI BSC -A: Performed by: SPECIALIST

## 2019-09-15 PROCEDURE — 74011250636 HC RX REV CODE- 250/636

## 2019-09-15 PROCEDURE — C2625 STENT, NON-COR, TEM W/DEL SY: HCPCS | Performed by: SPECIALIST

## 2019-09-15 PROCEDURE — 74011250637 HC RX REV CODE- 250/637: Performed by: INTERNAL MEDICINE

## 2019-09-15 PROCEDURE — 76000 FLUOROSCOPY <1 HR PHYS/QHP: CPT

## 2019-09-15 PROCEDURE — 0F798DZ DILATION OF COMMON BILE DUCT WITH INTRALUMINAL DEVICE, VIA NATURAL OR ARTIFICIAL OPENING ENDOSCOPIC: ICD-10-PCS | Performed by: SPECIALIST

## 2019-09-15 PROCEDURE — 76010000138 HC OR TIME 0.5 TO 1 HR: Performed by: SPECIALIST

## 2019-09-15 PROCEDURE — 77030010603 HC BLN DEV INFL BSC -B: Performed by: SPECIALIST

## 2019-09-15 PROCEDURE — 74011000250 HC RX REV CODE- 250: Performed by: NURSE ANESTHETIST, CERTIFIED REGISTERED

## 2019-09-15 PROCEDURE — 77030012596 HC SPHNTOM BILI BSC -E: Performed by: SPECIALIST

## 2019-09-15 PROCEDURE — C1769 GUIDE WIRE: HCPCS | Performed by: SPECIALIST

## 2019-09-15 PROCEDURE — 77030008684 HC TU ET CUF COVD -B: Performed by: NURSE ANESTHETIST, CERTIFIED REGISTERED

## 2019-09-15 PROCEDURE — 76060000032 HC ANESTHESIA 0.5 TO 1 HR: Performed by: SPECIALIST

## 2019-09-15 PROCEDURE — 82962 GLUCOSE BLOOD TEST: CPT

## 2019-09-15 DEVICE — BILIARY STENT WITH NAVIFLEXTM RX DELIVERY SYSTEM
Type: IMPLANTABLE DEVICE | Site: BILE DUCT | Status: FUNCTIONAL
Brand: ADVANIX™ BILIARY

## 2019-09-15 RX ORDER — SODIUM CHLORIDE, SODIUM LACTATE, POTASSIUM CHLORIDE, CALCIUM CHLORIDE 600; 310; 30; 20 MG/100ML; MG/100ML; MG/100ML; MG/100ML
INJECTION, SOLUTION INTRAVENOUS
Status: DISCONTINUED | OUTPATIENT
Start: 2019-09-15 | End: 2019-09-15 | Stop reason: HOSPADM

## 2019-09-15 RX ORDER — FENTANYL CITRATE 50 UG/ML
INJECTION, SOLUTION INTRAMUSCULAR; INTRAVENOUS AS NEEDED
Status: DISCONTINUED | OUTPATIENT
Start: 2019-09-15 | End: 2019-09-15 | Stop reason: HOSPADM

## 2019-09-15 RX ORDER — FENTANYL CITRATE 50 UG/ML
25 INJECTION, SOLUTION INTRAMUSCULAR; INTRAVENOUS AS NEEDED
Status: DISCONTINUED | OUTPATIENT
Start: 2019-09-15 | End: 2019-09-15 | Stop reason: HOSPADM

## 2019-09-15 RX ORDER — PROPOFOL 10 MG/ML
INJECTION, EMULSION INTRAVENOUS AS NEEDED
Status: DISCONTINUED | OUTPATIENT
Start: 2019-09-15 | End: 2019-09-15 | Stop reason: HOSPADM

## 2019-09-15 RX ORDER — DEXTROMETHORPHAN/PSEUDOEPHED 2.5-7.5/.8
1.2 DROPS ORAL
Status: DISCONTINUED | OUTPATIENT
Start: 2019-09-15 | End: 2019-09-15 | Stop reason: HOSPADM

## 2019-09-15 RX ORDER — SODIUM CHLORIDE 9 MG/ML
50 INJECTION, SOLUTION INTRAVENOUS CONTINUOUS
Status: DISPENSED | OUTPATIENT
Start: 2019-09-15 | End: 2019-09-15

## 2019-09-15 RX ORDER — HYDROMORPHONE HYDROCHLORIDE 1 MG/ML
.25-1 INJECTION, SOLUTION INTRAMUSCULAR; INTRAVENOUS; SUBCUTANEOUS
Status: DISCONTINUED | OUTPATIENT
Start: 2019-09-15 | End: 2019-09-15 | Stop reason: HOSPADM

## 2019-09-15 RX ORDER — SODIUM CHLORIDE, SODIUM LACTATE, POTASSIUM CHLORIDE, CALCIUM CHLORIDE 600; 310; 30; 20 MG/100ML; MG/100ML; MG/100ML; MG/100ML
100 INJECTION, SOLUTION INTRAVENOUS CONTINUOUS
Status: DISCONTINUED | OUTPATIENT
Start: 2019-09-15 | End: 2019-09-15 | Stop reason: HOSPADM

## 2019-09-15 RX ORDER — LIDOCAINE HYDROCHLORIDE 20 MG/ML
INJECTION, SOLUTION EPIDURAL; INFILTRATION; INTRACAUDAL; PERINEURAL AS NEEDED
Status: DISCONTINUED | OUTPATIENT
Start: 2019-09-15 | End: 2019-09-15 | Stop reason: HOSPADM

## 2019-09-15 RX ORDER — ONDANSETRON 2 MG/ML
INJECTION INTRAMUSCULAR; INTRAVENOUS AS NEEDED
Status: DISCONTINUED | OUTPATIENT
Start: 2019-09-15 | End: 2019-09-15 | Stop reason: HOSPADM

## 2019-09-15 RX ORDER — SUCCINYLCHOLINE CHLORIDE 20 MG/ML
INJECTION INTRAMUSCULAR; INTRAVENOUS AS NEEDED
Status: DISCONTINUED | OUTPATIENT
Start: 2019-09-15 | End: 2019-09-15 | Stop reason: HOSPADM

## 2019-09-15 RX ORDER — MIDAZOLAM HYDROCHLORIDE 1 MG/ML
INJECTION, SOLUTION INTRAMUSCULAR; INTRAVENOUS AS NEEDED
Status: DISCONTINUED | OUTPATIENT
Start: 2019-09-15 | End: 2019-09-15 | Stop reason: HOSPADM

## 2019-09-15 RX ORDER — TRAMADOL HYDROCHLORIDE 50 MG/1
50 TABLET ORAL
Qty: 15 TAB | Refills: 0 | Status: SHIPPED | OUTPATIENT
Start: 2019-09-15 | End: 2019-09-20

## 2019-09-15 RX ORDER — NALOXONE HYDROCHLORIDE 0.4 MG/ML
0.4 INJECTION, SOLUTION INTRAMUSCULAR; INTRAVENOUS; SUBCUTANEOUS
Status: ACTIVE | OUTPATIENT
Start: 2019-09-15 | End: 2019-09-15

## 2019-09-15 RX ORDER — FLUMAZENIL 0.1 MG/ML
0.2 INJECTION INTRAVENOUS
Status: ACTIVE | OUTPATIENT
Start: 2019-09-15 | End: 2019-09-15

## 2019-09-15 RX ORDER — DEXAMETHASONE SODIUM PHOSPHATE 4 MG/ML
INJECTION, SOLUTION INTRA-ARTICULAR; INTRALESIONAL; INTRAMUSCULAR; INTRAVENOUS; SOFT TISSUE AS NEEDED
Status: DISCONTINUED | OUTPATIENT
Start: 2019-09-15 | End: 2019-09-15 | Stop reason: HOSPADM

## 2019-09-15 RX ORDER — ROCURONIUM BROMIDE 10 MG/ML
INJECTION, SOLUTION INTRAVENOUS AS NEEDED
Status: DISCONTINUED | OUTPATIENT
Start: 2019-09-15 | End: 2019-09-15 | Stop reason: HOSPADM

## 2019-09-15 RX ORDER — MIDAZOLAM HYDROCHLORIDE 1 MG/ML
.25-5 INJECTION, SOLUTION INTRAMUSCULAR; INTRAVENOUS AS NEEDED
Status: DISCONTINUED | OUTPATIENT
Start: 2019-09-15 | End: 2019-09-15 | Stop reason: HOSPADM

## 2019-09-15 RX ADMIN — LIDOCAINE HYDROCHLORIDE 60 MG: 20 INJECTION, SOLUTION INTRAVENOUS at 08:15

## 2019-09-15 RX ADMIN — PROPOFOL 180 MG: 10 INJECTION, EMULSION INTRAVENOUS at 08:15

## 2019-09-15 RX ADMIN — SODIUM CHLORIDE, SODIUM LACTATE, POTASSIUM CHLORIDE, AND CALCIUM CHLORIDE 100 ML/HR: 600; 310; 30; 20 INJECTION, SOLUTION INTRAVENOUS at 10:44

## 2019-09-15 RX ADMIN — DEXAMETHASONE SODIUM PHOSPHATE 4 MG: 4 INJECTION, SOLUTION INTRAMUSCULAR; INTRAVENOUS at 08:25

## 2019-09-15 RX ADMIN — Medication 10 ML: at 05:41

## 2019-09-15 RX ADMIN — ONDANSETRON 4 MG: 2 INJECTION INTRAMUSCULAR; INTRAVENOUS at 08:25

## 2019-09-15 RX ADMIN — MIDAZOLAM HYDROCHLORIDE 2 MG: 1 INJECTION, SOLUTION INTRAMUSCULAR; INTRAVENOUS at 08:09

## 2019-09-15 RX ADMIN — INSULIN LISPRO 3 UNITS: 100 INJECTION, SOLUTION INTRAVENOUS; SUBCUTANEOUS at 12:20

## 2019-09-15 RX ADMIN — CARVEDILOL 6.25 MG: 6.25 TABLET, FILM COATED ORAL at 08:00

## 2019-09-15 RX ADMIN — FENTANYL CITRATE 100 MCG: 50 INJECTION, SOLUTION INTRAMUSCULAR; INTRAVENOUS at 08:15

## 2019-09-15 RX ADMIN — SODIUM CHLORIDE, POTASSIUM CHLORIDE, SODIUM LACTATE AND CALCIUM CHLORIDE: 600; 310; 30; 20 INJECTION, SOLUTION INTRAVENOUS at 08:06

## 2019-09-15 RX ADMIN — ROCURONIUM BROMIDE 5 MG: 50 INJECTION, SOLUTION INTRAVENOUS at 08:15

## 2019-09-15 RX ADMIN — SUCCINYLCHOLINE CHLORIDE 100 MG: 20 INJECTION, SOLUTION INTRAMUSCULAR; INTRAVENOUS; PARENTERAL at 08:15

## 2019-09-15 NOTE — PROCEDURES
[address] ALISTAIR Sabillon MD  [phone]      September 15, 2019    Endoscopic Retrograde CholangioPancreatography (ERCP) Procedure Note  Steven Singh  : 1960  [facility] Medical Record Number: [id number]      Indications:   CT shows CBD stone 7mm and dilated CBD after cholecystectomy with symptoms of biliary obstruction and elevated bilirubin and liver enzymes. PCP:  Luzmaria Valenzuela MD  Anesthesia/Sedation: General endotracheal anesthesia -- see notes  Endoscopist:  Dr. Hanna Mcfarland  Complications:  None  Estimated Blood Loss:  None     Permit:  The indications, risks, benefits and alternatives were reviewed with the patient or their decision maker who was provided an opportunity to ask questions and all questions were answered. The specific risks of endoscopic retrograde cholangiopancreatography with conscious sedation were reviewed, including but not limited to anesthetic complication, bleeding, adverse drug reaction, missed lesion, infection, IV site reactions, intestinal perforation which would lead to the need for surgical repair, failed cannulation, and post-ERCP pancreatitis. Specific mention was made of the incidence of post-ERCP pancreatitis, estimated at 5% or 1 out of 20. The patient was advised that although most who develop post-ERCP pancreatitis have mild interstitial pancreatitis, some patients can develop severe necrotizing pancreatitis which could lead to the need for prolonged hospitalization, the need for surgery, the need for antibiotics, the need for blood products, or the potential for subsequent sepsis, multiorgan failure, or even death. The alternatives to ERCP including observation without testing, magnetic resonance cholangiopancreatography, surgery, or percutaneous cholangiopancreatography were reviewed as well as the benefits and limitations of each of the above alternatives.   After considering the options and having all their questions answered, the patient or their decision maker provided both verbal and written consent to proceed. Procedure in Detail:  After obtaining informed consent, positioning of the patient prone, and conduction of a pre-procedure pause or \"time out\" the endoscope was introduced into the mouth and advanced to the duodenum to visualize and instrument the ampullary opening. There is a periampullary diverticulum within which is located the ampulla. We were able to selectively cannulate the common bile duct and perform cholangiography. I could not find a filling defect although the bile duct is dilated and the contrast fills the dilated CBD down the ampulla which was not draining well. I performed endoscopic sphincterotomy and even after this, the duct was not decompressing contrast.  We swept with a 9mm and 12mm balloon and no stones removed but even after balloon sweep the biliary tree did not drain well. I hypothesize either there is edema from a stone which has already passed or the diverticulum/sphincter interaction is preventing good drainage of the biliary tree. In either case I suspect she would have ongoing problems with biliary drainage as the column of contrast would not flow from the duct. I placed a 10Fr wide 7cm long plastic biliary stent and with this in place we see good decompression of the biliary tree. The procedure was considered complete. Specimens: none           Recommendations: Recover from anesthesia. If pain free at 1200 offer low fat diet. If pain free at 1400 consider discharge. I will arrange repeat procedure in 8 weeks for stent removal and re-examination, giving time for any inflammation from stone passage or remodeling of the sphincter pathway through the diverticulum to occur so that the stent may no longer be required. Thank you for entrusting me with this patient's care.   Please do not hesitate to contact me with any questions or if I can be of assistance with any of your other patients' GI needs. Signed By: Diana Streeter MD                        September 15, 2019     Surgical assistant none. Implants none unless specified.

## 2019-09-15 NOTE — PROGRESS NOTES
General Surgery Progress Note      S: ERCP today showed periampullary diverticulum, no stone, distal CBD stricture, underwent placement biliary stent. Denies abdominal pain or nausea.        Patient Vitals for the past 24 hrs:   Temp Pulse Resp BP SpO2   09/15/19 0919  70 16 149/79 96 %   09/15/19 0910  67 19 148/71 97 %   09/15/19 0905  73 15 146/56 96 %   09/15/19 0902 98.6 °F (37 °C) 75 16 130/72 96 %   09/15/19 0900  75 14 (!) 145/98 97 %   09/15/19 0856    130/72    09/15/19 0730 97.3 °F (36.3 °C)  22 172/86 96 %   09/15/19 0350 97.5 °F (36.4 °C) 65 17 143/85 95 %   09/14/19 2321 97.9 °F (36.6 °C) 79 17 144/89 95 %   09/14/19 2004 97.6 °F (36.4 °C) 77 18 145/85 91 %   09/14/19 1614 97.3 °F (36.3 °C) 77 18 150/84 98 %   09/14/19 1137 98 °F (36.7 °C) 78 18 (!) 167/96 94 %         Date 09/14/19 0700 - 09/15/19 0659 09/15/19 0700 - 09/16/19 0659   Shift 8999-5869 5880-5987 24 Hour Total 6778-8120 7453-5673 24 Hour Total   INTAKE   I.V.(mL/kg/hr)    600  600     Volume (lactated Ringers infusion)    600  600   Shift Total(mL/kg)    600(7.5)  600(7.5)   OUTPUT   Shift Total(mL/kg)         NET    600  600   Weight (kg) 79.8 79.8 79.8 79.8 79.8 79.8           Physical Exam:      General: NAD,A&Ox3  Skin: no jaundice  Resp: CTAB  CV: RRR  Abdomen: soft, non-tender, non-distended, well healed laparoscopic incisions without hernia  Extremity: warm, no edema    Lab Results   Component Value Date/Time    WBC 8.0 09/13/2019 06:07 PM    HGB 14.0 09/13/2019 06:07 PM    HCT 42.4 09/13/2019 06:07 PM    PLATELET 696 00/73/0728 06:07 PM    MCV 85.8 09/13/2019 06:07 PM       Lab Results   Component Value Date/Time    Sodium 136 09/13/2019 06:07 PM    Potassium 3.5 09/13/2019 06:07 PM    Chloride 96 (L) 09/13/2019 06:07 PM    CO2 31 09/13/2019 06:07 PM    Anion gap 9 09/13/2019 06:07 PM    Glucose 155 (H) 09/13/2019 06:07 PM    BUN 10 09/13/2019 06:07 PM    Creatinine 0.71 09/13/2019 06:07 PM    BUN/Creatinine ratio 14 09/13/2019 06:07 PM    GFR est AA >60 09/13/2019 06:07 PM    GFR est non-AA >60 09/13/2019 06:07 PM    Calcium 9.1 09/13/2019 06:07 PM        No results found for: INR, PTMR, PTP, PT1, PT2        A/P:  Retained common bile duct stone s/p ERCP with placement biliary stent 9/15/19    Diet per GI  Mobilize  No outpatient surgical follow-up needed.       Joey Partida MD

## 2019-09-15 NOTE — DISCHARGE SUMMARY
Marcelo Wu [facility] [address], [place], [address] [facility]  Tel: [phone]    Physician Discharge Summary    Patient ID:    Odalys Torres  Age:              61 y.o.    : 1960  MRN:             [id number]     PCP: Brianda Villalobos MD     Admit date: 2019    Discharge date: 9/15/2019    Principal admission Diagnosis:   Choledocholithiasis [K80.50]    Discharge Diagnoses:  Principal Problem:    Choledocholithiasis (2019)    Hypertension (2019)    S/P cholecystectomy (2019)    DM (diabetes mellitus), type 2 ([address] (9/15/2019)    Consults: GI and General Surgery    Imaging: The patient is status post cholecystectomy with stranding and inflammatory change in the gallbladder fossa. There is a calculus within the common bile duct measuring 0.7 cm in the head of the pancreas. The common bile duct is minimally distended at 0.9 cm. Hospital Course:     Ms. Brooke Hendricks is a 61 y.o. admitted to [facility] and treated for the following:    Choledocholithiasis POA: seen by gen surgery and GI and she is sp ERSP with an endoscopic sphincterotomy and a 10Fr wide 7cm long plastic biliary stent placement. She has done well post procedure, tolerated diet well without symptoms and cleared by GI. She will be discharged in stable condition with follow up as instructed with GI    Elevated LFTs: due to the above. Trending down. Clinically stable. Type 2 DM: resume home metformin and a DM low fat diet at discharge    HTN: BP stable. Resume Losartan    Discharge Exam:    Visit Vitals  /81 (BP 1 Location: Right arm, BP Patient Position: At rest)   Pulse 65   Temp 97.6 °F (36.4 °C)   Resp 18   Ht 5' 2\" (1.575 m)   Wt 79.8 kg (176 lb)   SpO2 97%   Breastfeeding?  No   BMI 32.19 kg/m²      General: well looking and stable patient in no acute distress  Pulm: clear breath sounds without wheezes  Card: no murmurs, normal S1, S2 without thrills, bruits   Abd:    soft, non-tender, normoactive bowel sounds  Skin: no rashes or ulcers, skin turgor is good  Neuro: awake, alert and has a non focal     Activity: Activity as tolerated    Diet: Diabetic Diet and Low fat, Low cholesterol    Current Discharge Medication List      START taking these medications    Details   traMADol (ULTRAM) 50 mg tablet Take 1 Tab by mouth every six (6) hours as needed for Pain for up to 15 doses. Max Daily Amount: 200 mg. Qty: 15 Tab, Refills: 0    Associated Diagnoses: Retained gallstones following laparoscopic cholecystectomy         CONTINUE these medications which have NOT CHANGED    Details   metFORMIN (GLUCOPHAGE) 500 mg tablet Take 1 Tab by mouth two (2) times daily (with meals). Qty: 60 Tab, Refills: 2    Associated Diagnoses: Type 2 diabetes mellitus with complication, without long-term current use of insulin ([facility])      valsartan (DIOVAN) 80 mg tablet Take 1 Tab by mouth daily. Qty: 30 Tab, Refills: 3    Associated Diagnoses: Essential hypertension      ONE TOUCH DELICA 33 gauge misc CHECK FASTING BLOOD SUGARS DAILY  Refills: 1      lancets misc Check fasting blood sugars daily. Qty: 1 Each, Refills: 11    Associated Diagnoses: Type 2 diabetes mellitus with complication, without long-term current use of insulin ([facility])      glucose blood VI test strips (ASCENSIA AUTODISC VI, ONE TOUCH ULTRA TEST VI) strip Check fasting blood sugars daily. Contour Next one.   Qty: 100 Strip, Refills: 11    Associated Diagnoses: Type 2 diabetes mellitus with complication, without long-term current use of insulin ([facility]      Michael Croak monitoring kit use as directed  Qty: 1 Kit, Refills: 0             Follow-up Information     Follow up With Specialties Details Why Contact Info    Tani Arrieta MD Gastroenterology Call to confirm follow up and review [address] Viry Amador MD Internal Medicine  as scheduled [address] [facility]  [phone]            Follow-up tests or labs: None    Discharge Condition: Stable    Disposition: home    Time taken to arrange discharge:  35 minutes.     Signed:  Dwight Menendez MD     [facility]  9/15/2019   12:35 PM

## 2019-09-15 NOTE — PROGRESS NOTES
Bedside and Verbal shift change report given to MARIA ELENA Mancia (oncoming nurse) by Khloe Siddiqi (offgoing nurse). Report included the following information SBAR, Kardex, Intake/Output, MAR and Recent Results.

## 2019-09-15 NOTE — PROGRESS NOTES
Pt left for her ERCP at this time after having maintained NPO status. IV site was clean, dry, and intact. No acute distress noted.

## 2019-09-15 NOTE — ROUTINE PROCESS
TRANSFER - OUT REPORT:    Verbal report given to Aayush Cook RN on Katie Marti  being transferred to  for routine post - op       Report consisted of patients Situation, Background, Assessment and   Recommendations(SBAR). Information from the following report(s) SBAR and OR Summary was reviewed with the receiving nurse. Lines:   Peripheral IV 09/15/19 Left Antecubital (Active)   Site Assessment Clean, dry, & intact 9/15/2019  9:00 AM   Phlebitis Assessment 0 9/15/2019  9:00 AM   Infiltration Assessment 0 9/15/2019  9:00 AM   Dressing Status Clean, dry, & intact 9/15/2019  9:00 AM   Dressing Type Tape;Transparent 9/15/2019  9:00 AM   Hub Color/Line Status Pink;Capped 9/15/2019  9:00 AM   Alcohol Cap Used Yes 9/15/2019  7:55 AM       Peripheral IV 09/15/19 Left Hand (Active)   Site Assessment Clean, dry, & intact 9/15/2019  9:00 AM   Phlebitis Assessment 0 9/15/2019  9:00 AM   Infiltration Assessment 0 9/15/2019  9:00 AM   Dressing Status Clean, dry, & intact 9/15/2019  9:00 AM   Dressing Type Tape;Transparent 9/15/2019  9:00 AM   Hub Color/Line Status Pink; Infusing 9/15/2019  9:00 AM        Opportunity for questions and clarification was provided.       Patient transported with:   Registered Nurse  Tech

## 2019-09-15 NOTE — ANESTHESIA POSTPROCEDURE EVALUATION
Procedure(s):  ENDOSCOPIC RETROGRADE CHOLANGIOPANCREATOGRAPHY (ERCP). general    Anesthesia Post Evaluation      Multimodal analgesia: multimodal analgesia not used between 6 hours prior to anesthesia start to PACU discharge  Patient location during evaluation: PACU  Patient participation: complete - patient participated  Level of consciousness: awake  Pain management: adequate  Airway patency: patent  Anesthetic complications: no  Cardiovascular status: acceptable, blood pressure returned to baseline and hemodynamically stable  Respiratory status: acceptable  Hydration status: acceptable  Post anesthesia nausea and vomiting:  controlled      Vitals Value Taken Time   /91 9/15/2019  9:15 AM   Temp 37 °C (98.6 °F) 9/15/2019  9:02 AM   Pulse 65 9/15/2019  9:18 AM   Resp 12 9/15/2019  9:18 AM   SpO2 94 % 9/15/2019  9:18 AM   Vitals shown include unvalidated device data.

## 2019-09-15 NOTE — PERIOP NOTES
Right wrist IV- unable to flush, infiltrated.   IV cath removed intact, drsg applied and bleeding controlled with 2x2 gauze

## 2019-09-15 NOTE — ANESTHESIA PREPROCEDURE EVALUATION
Anesthetic History   No history of anesthetic complications            Review of Systems / Medical History  Patient summary reviewed and pertinent labs reviewed    Pulmonary  Within defined limits                 Neuro/Psych   Within defined limits           Cardiovascular    Hypertension              Exercise tolerance: >4 METS     GI/Hepatic/Renal  Within defined limits             Comments: Acute cholecystitis Endo/Other    Diabetes: well controlled, type 2    Obesity  Pertinent negatives: No morbid obesity   Other Findings              Physical Exam    Airway  Mallampati: II    Neck ROM: normal range of motion   Mouth opening: Normal     Cardiovascular    Rhythm: regular  Rate: normal         Dental  No notable dental hx       Pulmonary  Breath sounds clear to auscultation               Abdominal  GI exam deferred       Other Findings            Anesthetic Plan    ASA: 2  Anesthesia type: general          Induction: Intravenous  Anesthetic plan and risks discussed with: Patient

## 2019-09-15 NOTE — DISCHARGE INSTRUCTIONS
HOSPITALIST DISCHARGE INSTRUCTIONS    NAME: Odalys Torres   :  1960   MRN:  [id number]     Date:     9/15/2019    ADMIT DATE: 2019     DISCHARGE DATE: 9/15/2019     PRINCIPAL ADMITTING DIAGNOSIS:  Choledocholithiasis [K80.50]    DISCHARGE DIAGNOSES:  Principal Problem:    Choledocholithiasis (2019)    Hypertension (2019)    S/P cholecystectomy (2019)    DM (diabetes mellitus), type 2 ([facility] (9/15/2019)    MEDICATIONS:    · It is important that medications are taken exactly as they are prescribed on the discharge medication instructions and keep them your  in the bottles provided by the pharmacist.   · Keep a list of the medication names, dosages, and times to be taken at all times. · Do not take other medications without consulting your doctor. Recommended diet:  Diabetic Diet and Low fat, Low cholesterol    Recommended activity: Activity as tolerated    Post discharge care:    Notify follow up health care provider or return to the emergency department if you cannot get hold of your doctor if you feel worse or experience symptoms similar to those that brought you to hospital    Follow-up Information     Follow up With Specialties Details Why Contact Info    Yogi Gonsales MD Gastroenterology Call to confirm follow up and review Pending sale to [facility] [address] Phongedelmira Villalobos MD Internal Medicine  as scheduled [address]  [address] [facility]  [phone]            Information obtained by :  I understand that if any problems occur once I am at home I am to contact my physician and I understand and acknowledge receipt of the instructions indicated above.                                                                                                                                            Physician's or R.N.'s Signature                                                                  Date/Time Patient or Representative Signature                                                          Date/Time

## 2019-09-15 NOTE — PROGRESS NOTES
Bedside and Verbal shift change report given to Aurora Montes De Oca RN (oncoming nurse) by Meri Shetty RN (offgoing nurse). Report included the following information SBAR, Kardex, Intake/Output, MAR and Recent Results.

## 2019-09-18 DIAGNOSIS — R79.89 ELEVATED LFTS: Primary | ICD-10-CM

## 2019-10-04 LAB
ALBUMIN SERPL-MCNC: 4 G/DL (ref 3.5–5.5)
ALBUMIN/GLOB SERPL: 1.5 {RATIO} (ref 1.2–2.2)
ALP SERPL-CCNC: 125 IU/L (ref 39–117)
ALT SERPL-CCNC: 40 IU/L (ref 0–32)
AST SERPL-CCNC: 34 IU/L (ref 0–40)
BILIRUB SERPL-MCNC: 0.8 MG/DL (ref 0–1.2)
BUN SERPL-MCNC: 9 MG/DL (ref 6–24)
BUN/CREAT SERPL: 13 (ref 9–23)
CALCIUM SERPL-MCNC: 9.3 MG/DL (ref 8.7–10.2)
CHLORIDE SERPL-SCNC: 104 MMOL/L (ref 96–106)
CO2 SERPL-SCNC: 24 MMOL/L (ref 20–29)
CREAT SERPL-MCNC: 0.7 MG/DL (ref 0.57–1)
GLOBULIN SER CALC-MCNC: 2.6 G/DL (ref 1.5–4.5)
GLUCOSE SERPL-MCNC: 128 MG/DL (ref 65–99)
POTASSIUM SERPL-SCNC: 4.2 MMOL/L (ref 3.5–5.2)
PROT SERPL-MCNC: 6.6 G/DL (ref 6–8.5)
SODIUM SERPL-SCNC: 143 MMOL/L (ref 134–144)

## 2019-10-07 ENCOUNTER — OFFICE VISIT (OUTPATIENT)
Dept: INTERNAL MEDICINE CLINIC | Age: 59
End: 2019-10-07

## 2019-10-07 VITALS
SYSTOLIC BLOOD PRESSURE: 145 MMHG | OXYGEN SATURATION: 98 % | RESPIRATION RATE: 16 BRPM | TEMPERATURE: 98.4 F | HEIGHT: 62 IN | WEIGHT: 184 LBS | HEART RATE: 110 BPM | BODY MASS INDEX: 33.86 KG/M2 | DIASTOLIC BLOOD PRESSURE: 95 MMHG

## 2019-10-07 DIAGNOSIS — I10 ESSENTIAL HYPERTENSION: ICD-10-CM

## 2019-10-07 DIAGNOSIS — Z23 ENCOUNTER FOR IMMUNIZATION: ICD-10-CM

## 2019-10-07 DIAGNOSIS — E78.5 HYPERLIPIDEMIA, UNSPECIFIED HYPERLIPIDEMIA TYPE: ICD-10-CM

## 2019-10-07 DIAGNOSIS — E11.69 TYPE 2 DIABETES MELLITUS WITH OTHER SPECIFIED COMPLICATION, WITHOUT LONG-TERM CURRENT USE OF INSULIN (HCC): ICD-10-CM

## 2019-10-07 DIAGNOSIS — K80.50 CHOLEDOCHOLITHIASIS: Primary | ICD-10-CM

## 2019-10-07 NOTE — PROGRESS NOTES
Yanique Garcia is a 61 y.o. female who presents today for Transitions Of Care  . She has a history of   Patient Active Problem List   Diagnosis Code    Acute cholecystitis due to biliary calculus K80.00    Hypertension I10    S/P cholecystectomy Z90.49    Acute cholecystitis K81.0    Class 1 obesity in adult E66.9    Choledocholithiasis K80.50    DM (diabetes mellitus), type 2 ([facility] E11.9   . Today patient is here for EFFIE. Hospitalization: Patient was hospitalized from the 13th to the 15th of this month due to a retained very stone. Patient underwent ERCP with Dr. Aram West on the 15th. She also had a biliary stent placed. She has a scheduled procedure on 13 November to remove the biliary stent. Since being home patient notes that she feels well. No more nausea or vomiting. Negrito Hall Hypertension -not currently taking valsartan. She notes she is not taking this due to starting metformin. Hypertension ROS: taking medications as instructed, no medication side effects noted, no TIA's, no chest pain on exertion, no dyspnea on exertion, no swelling of ankles     reports that she has never smoked. She has never used smokeless tobacco.    reports that she drinks about 1.0 standard drinks of alcohol per week. BP Readings from Last 2 Encounters:   10/07/19 (!) 145/95   09/15/19 132/68     Diabetes Mellitus follow-up - BG is 104-140. Last hemoglobin a1c   Lab Results   Component Value Date/Time    Hemoglobin A1c 8.1 (H) 08/13/2019 08:12 PM     No components found for: POCA1C, HBA1C POC  medication compliance: compliant all of the time. Diabetic diet compliance: compliant most of the time. Home glucose monitoring: fasting values range 104-140   Further diabetic ROS: no polyuria or polydipsia, no chest pain, dyspnea or TIA's, no numbness, tingling or pain in extremities.    Microalbuminuria:   Lab Results   Component Value Date/Time    Microalb/Creat ratio (ug/mg creat.) 44.4 (H) 09/11/2019 10:02 AM ROS  Review of Systems   Constitutional: Negative for chills, fever and weight loss. HENT: Negative for congestion and sore throat. Eyes: Negative for blurred vision, double vision and photophobia. Respiratory: Negative for cough and shortness of breath. Cardiovascular: Negative for chest pain, palpitations and leg swelling. Gastrointestinal: Negative for abdominal pain, constipation, diarrhea, heartburn, nausea and vomiting. Genitourinary: Negative for dysuria, frequency and urgency. Musculoskeletal: Negative for joint pain and myalgias. Skin: Negative for rash. Neurological: Negative. Negative for headaches. Endo/Heme/Allergies: Does not bruise/bleed easily. Psychiatric/Behavioral: Negative for memory loss and suicidal ideas. Visit Vitals  BP (!) 145/95   Pulse (!) 110   Temp 98.4 °F (36.9 °C) (Oral)   Resp 16   Ht 5' 2\" (1.575 m)   Wt 184 lb (83.5 kg)   SpO2 98%   BMI 33.65 kg/m²       Physical Exam   Constitutional: She is oriented to person, place, and time. She appears well-developed and well-nourished. No distress. HENT:   Head: Normocephalic and atraumatic. Neck: Normal range of motion. Neck supple. No thyromegaly present. Cardiovascular: Normal rate and regular rhythm. No murmur heard. Pulmonary/Chest: Effort normal and breath sounds normal. She has no wheezes. Abdominal: Soft. Bowel sounds are normal. She exhibits no distension. Musculoskeletal: She exhibits no edema. Neurological: She is alert and oriented to person, place, and time. No cranial nerve deficit. Skin: Skin is warm and dry. Psychiatric: She has a normal mood and affect. Her behavior is normal.         Current Outpatient Medications   Medication Sig    ONE TOUCH DELICA 33 gauge misc CHECK FASTING BLOOD SUGARS DAILY    metFORMIN (GLUCOPHAGE) 500 mg tablet Take 1 Tab by mouth two (2) times daily (with meals).  lancets misc Check fasting blood sugars daily.     valsartan (DIOVAN) 80 mg tablet Take 1 Tab by mouth daily.  glucose blood VI test strips (ASCENSIA AUTODISC VI, ONE TOUCH ULTRA TEST VI) strip Check fasting blood sugars daily. Contour Next one.  ONETOUCH ULTRAMINI monitoring kit use as directed     No current facility-administered medications for this visit. Past Medical History:   Diagnosis Date    Type 2 diabetes mellitus ([facility]       Past Surgical History:   Procedure Laterality Date    HX CHOLECYSTECTOMY      HX LAP CHOLECYSTECTOMY  08/11/2019      Social History     Tobacco Use    Smoking status: Never Smoker    Smokeless tobacco: Never Used   Substance Use Topics    Alcohol use: Yes     Alcohol/week: 1.0 standard drinks     Types: 1 Glasses of wine per week     Comment: 1-3 drinks per month      Family History   Problem Relation Age of Onset    Diabetes Mother         type 2    Hypertension Mother     Diabetes Sister     Congenital heart defect Maternal Grandmother         No Known Allergies     Assessment/Plan  Diagnoses and all orders for this visit:    1. Choledocholithiasis -status post ERCP with stent placement. Patient feeling much better. She is scheduled to have this removed in November. 2. Encounter for immunization  -     INFLUENZA VIRUS VAC QUAD,SPLIT,PRESV FREE SYRINGE IM  -     AK IMMUNIZ ADMIN,1 SINGLE/COMB VAC/TOXOID    3. Essential hypertension -has not resumed ARB. She will start this today and let us know what her blood pressure looks like in 2 weeks. -     METABOLIC PANEL, COMPREHENSIVE; Future    4. Type 2 diabetes mellitus with other specified complication, without long-term current use of insulin (HCC) -her blood sugars are controlled  -     HEMOGLOBIN A1C WITH EAG; Future    5. Hyperlipidemia, unspecified hyperlipidemia type -we will repeat this in future labs  -     LIPID PANEL; Future        Follow-up and Dispositions    · Return in about 3 months (around 1/7/2020) for CPE.          Lauryn Roman MD  10/7/2019    This note was created with the help of speech recognition software (Dragon) and may contain some 'sound alike' errors.

## 2019-11-03 DIAGNOSIS — I10 ESSENTIAL HYPERTENSION: ICD-10-CM

## 2019-11-03 RX ORDER — VALSARTAN AND HYDROCHLOROTHIAZIDE 160; 12.5 MG/1; MG/1
1 TABLET, FILM COATED ORAL DAILY
Qty: 30 TAB | Refills: 3 | Status: SHIPPED | OUTPATIENT
Start: 2019-11-03 | End: 2020-03-02 | Stop reason: SDUPTHER

## 2019-11-07 NOTE — PERIOP NOTES
[address] [place] [address] Dr Greenwood Preprocedure Instructions      1. On the day of your surgery, please report to registration located on the 2nd floor of the  [facility]. yes    2. You must have a responsible adult to drive you to the hospital, stay at the hospital during your procedure and drive you home. If they leave your procedure will not be started (no exceptions). yes    3. Do not have anything to eat or drink (including water, gum, mints, coffee, and juice) after midnight. This does not apply to the medications you were instructed to take by your physician. yesIf you are currently taking Plavix, Coumadin, Aspirin, or other blood-thinning agents, contact your physician for special instructions. yes,    4. If you are having a procedure that requires bowel prep: We recommend that you have only clear liquids the day before your procedure and begin your bowel prep by 5:00 pm.  You may continue to drink clear liquids until midnight. If for any reason you are not able to complete your prep please notify your physician immediately. yes    5. Have a list of all current medications, including vitamins, herbal supplements and any other over the counter medications. yes    6. If you wear glasses, contacts, dentures and/or hearing aids, they may be removed prior to procedure, please bring a case to store them in. yes    7. You should understand that if you do not follow these instructions your procedure may be cancelled. If your physical condition changes (I.e. fever, cold or flu) please contact your doctor as soon as possible. 8. It is important that you be on time.   If for any reason you are unable to keep your appointment please call )- the day of or your physicians office prior to your scheduled procedure

## 2019-11-12 ENCOUNTER — ANESTHESIA EVENT (OUTPATIENT)
Dept: ENDOSCOPY | Age: 59
End: 2019-11-12
Payer: COMMERCIAL

## 2019-11-12 NOTE — H&P
61 y.o. female for repeat ERCP. Last done 2 months ago as CT suggested choledocholithiasis. No stone seen or swept from the duct but poor biliary emptying was noted at primary ERCP so plastic stent placed. Procedure will be repeated for stent removal, cholangiogram and evaluation for any persistent stricture or filling defect. Additional data for completion of the targeted pre-endoscopy H&P will be provided under 'H&P interval notes'. Please see that document which will be attached to this.   Jonna Hayden MD

## 2019-11-13 ENCOUNTER — ANESTHESIA (OUTPATIENT)
Dept: ENDOSCOPY | Age: 59
End: 2019-11-13
Payer: COMMERCIAL

## 2019-11-13 ENCOUNTER — APPOINTMENT (OUTPATIENT)
Dept: GENERAL RADIOLOGY | Age: 59
End: 2019-11-13
Attending: SPECIALIST
Payer: COMMERCIAL

## 2019-11-13 ENCOUNTER — HOSPITAL ENCOUNTER (OUTPATIENT)
Age: 59
Setting detail: OUTPATIENT SURGERY
Discharge: HOME | End: 2019-11-13
Attending: SPECIALIST | Admitting: SPECIALIST
Payer: COMMERCIAL

## 2019-11-13 VITALS
OXYGEN SATURATION: 98 % | RESPIRATION RATE: 22 BRPM | HEIGHT: 62 IN | HEART RATE: 84 BPM | SYSTOLIC BLOOD PRESSURE: 141 MMHG | BODY MASS INDEX: 33.71 KG/M2 | DIASTOLIC BLOOD PRESSURE: 78 MMHG | TEMPERATURE: 98 F | WEIGHT: 183.2 LBS

## 2019-11-13 PROCEDURE — 77030010603 HC BLN DEV INFL BSC -B: Performed by: SPECIALIST

## 2019-11-13 PROCEDURE — 76040000007: Performed by: SPECIALIST

## 2019-11-13 PROCEDURE — 77030010104 HC SEAL PRT ENDOSC BYRN -B: Performed by: SPECIALIST

## 2019-11-13 PROCEDURE — 74011636320 HC RX REV CODE- 636/320: Performed by: SPECIALIST

## 2019-11-13 PROCEDURE — 74011250636 HC RX REV CODE- 250/636: Performed by: NURSE ANESTHETIST, CERTIFIED REGISTERED

## 2019-11-13 PROCEDURE — 76060000032 HC ANESTHESIA 0.5 TO 1 HR: Performed by: SPECIALIST

## 2019-11-13 PROCEDURE — 77030013992 HC SNR POLYP ENDOSC BSC -B: Performed by: SPECIALIST

## 2019-11-13 PROCEDURE — 74011000250 HC RX REV CODE- 250: Performed by: NURSE ANESTHETIST, CERTIFIED REGISTERED

## 2019-11-13 PROCEDURE — 77030009038 HC CATH BILI STN RTVR BSC -C: Performed by: SPECIALIST

## 2019-11-13 PROCEDURE — 77030012596 HC SPHNTOM BILI BSC -E: Performed by: SPECIALIST

## 2019-11-13 PROCEDURE — C1726 CATH, BAL DIL, NON-VASCULAR: HCPCS | Performed by: SPECIALIST

## 2019-11-13 PROCEDURE — 77030007288 HC DEV LOK BILI BSC -A: Performed by: SPECIALIST

## 2019-11-13 RX ORDER — PROPOFOL 10 MG/ML
INJECTION, EMULSION INTRAVENOUS AS NEEDED
Status: DISCONTINUED | OUTPATIENT
Start: 2019-11-13 | End: 2019-11-13 | Stop reason: HOSPADM

## 2019-11-13 RX ORDER — SODIUM CHLORIDE 9 MG/ML
50 INJECTION, SOLUTION INTRAVENOUS CONTINUOUS
Status: DISCONTINUED | OUTPATIENT
Start: 2019-11-13 | End: 2019-11-13 | Stop reason: HOSPADM

## 2019-11-13 RX ORDER — FLUMAZENIL 0.1 MG/ML
0.2 INJECTION INTRAVENOUS
Status: DISCONTINUED | OUTPATIENT
Start: 2019-11-13 | End: 2019-11-13 | Stop reason: HOSPADM

## 2019-11-13 RX ORDER — FENTANYL CITRATE 50 UG/ML
INJECTION, SOLUTION INTRAMUSCULAR; INTRAVENOUS AS NEEDED
Status: DISCONTINUED | OUTPATIENT
Start: 2019-11-13 | End: 2019-11-13 | Stop reason: HOSPADM

## 2019-11-13 RX ORDER — PROPOFOL 10 MG/ML
INJECTION, EMULSION INTRAVENOUS
Status: DISCONTINUED | OUTPATIENT
Start: 2019-11-13 | End: 2019-11-13 | Stop reason: HOSPADM

## 2019-11-13 RX ORDER — SUCCINYLCHOLINE CHLORIDE 20 MG/ML
INJECTION INTRAMUSCULAR; INTRAVENOUS AS NEEDED
Status: DISCONTINUED | OUTPATIENT
Start: 2019-11-13 | End: 2019-11-13 | Stop reason: HOSPADM

## 2019-11-13 RX ORDER — LIDOCAINE HYDROCHLORIDE 20 MG/ML
INJECTION, SOLUTION EPIDURAL; INFILTRATION; INTRACAUDAL; PERINEURAL AS NEEDED
Status: DISCONTINUED | OUTPATIENT
Start: 2019-11-13 | End: 2019-11-13 | Stop reason: HOSPADM

## 2019-11-13 RX ORDER — DEXTROMETHORPHAN/PSEUDOEPHED 2.5-7.5/.8
1.2 DROPS ORAL
Status: DISCONTINUED | OUTPATIENT
Start: 2019-11-13 | End: 2019-11-13 | Stop reason: HOSPADM

## 2019-11-13 RX ORDER — ROCURONIUM BROMIDE 10 MG/ML
INJECTION, SOLUTION INTRAVENOUS AS NEEDED
Status: DISCONTINUED | OUTPATIENT
Start: 2019-11-13 | End: 2019-11-13 | Stop reason: HOSPADM

## 2019-11-13 RX ORDER — NALOXONE HYDROCHLORIDE 0.4 MG/ML
0.4 INJECTION, SOLUTION INTRAMUSCULAR; INTRAVENOUS; SUBCUTANEOUS
Status: DISCONTINUED | OUTPATIENT
Start: 2019-11-13 | End: 2019-11-13 | Stop reason: HOSPADM

## 2019-11-13 RX ORDER — MIDAZOLAM HYDROCHLORIDE 1 MG/ML
.25-5 INJECTION, SOLUTION INTRAMUSCULAR; INTRAVENOUS AS NEEDED
Status: DISCONTINUED | OUTPATIENT
Start: 2019-11-13 | End: 2019-11-13 | Stop reason: HOSPADM

## 2019-11-13 RX ORDER — SODIUM CHLORIDE, SODIUM LACTATE, POTASSIUM CHLORIDE, CALCIUM CHLORIDE 600; 310; 30; 20 MG/100ML; MG/100ML; MG/100ML; MG/100ML
INJECTION, SOLUTION INTRAVENOUS
Status: DISCONTINUED | OUTPATIENT
Start: 2019-11-13 | End: 2019-11-13 | Stop reason: HOSPADM

## 2019-11-13 RX ORDER — FENTANYL CITRATE 50 UG/ML
25 INJECTION, SOLUTION INTRAMUSCULAR; INTRAVENOUS AS NEEDED
Status: DISCONTINUED | OUTPATIENT
Start: 2019-11-13 | End: 2019-11-13 | Stop reason: HOSPADM

## 2019-11-13 RX ADMIN — PROPOFOL 150 MG: 10 INJECTION, EMULSION INTRAVENOUS at 07:55

## 2019-11-13 RX ADMIN — PROPOFOL 150 MCG/KG/MIN: 10 INJECTION, EMULSION INTRAVENOUS at 07:55

## 2019-11-13 RX ADMIN — FENTANYL CITRATE 100 MCG: 50 INJECTION, SOLUTION INTRAMUSCULAR; INTRAVENOUS at 07:55

## 2019-11-13 RX ADMIN — SODIUM CHLORIDE, POTASSIUM CHLORIDE, SODIUM LACTATE AND CALCIUM CHLORIDE: 600; 310; 30; 20 INJECTION, SOLUTION INTRAVENOUS at 07:40

## 2019-11-13 RX ADMIN — ROCURONIUM BROMIDE 10 MG: 50 INJECTION, SOLUTION INTRAVENOUS at 07:55

## 2019-11-13 RX ADMIN — IOPAMIDOL 20 ML: 612 INJECTION, SOLUTION INTRAVENOUS at 08:40

## 2019-11-13 RX ADMIN — LIDOCAINE HYDROCHLORIDE 100 MG: 20 INJECTION, SOLUTION INTRAVENOUS at 07:55

## 2019-11-13 RX ADMIN — SUCCINYLCHOLINE CHLORIDE 100 MG: 20 INJECTION, SOLUTION INTRAMUSCULAR; INTRAVENOUS; PARENTERAL at 07:55

## 2019-11-13 NOTE — PROCEDURES
[address] ALISTAIR Chapman MD  [phone]      2019    Endoscopic Retrograde CholangioPancreatography (ERCP) Procedure Note  Robbie Cornejo  : 1960  Pedro Diane Medical Record Number: [id number]      Indications:   Choledocholithiasis, last ERCP with poor biliary drainage despite balloon sweep so stent placed. Today's procedure for stent removal.  PCP:  Crescencio Gibbons MD  Anesthesia/Sedation: General endotracheal anesthesia -- see notes  Endoscopist:  Dr. Lucía Berg  Complications:  None  Estimated Blood Loss:  None     Permit:  The indications, risks, benefits and alternatives were reviewed with the patient or their decision maker who was provided an opportunity to ask questions and all questions were answered. The specific risks of endoscopic retrograde cholangiopancreatography with conscious sedation were reviewed, including but not limited to anesthetic complication, bleeding, adverse drug reaction, missed lesion, infection, IV site reactions, intestinal perforation which would lead to the need for surgical repair, failed cannulation, and post-ERCP pancreatitis. Specific mention was made of the incidence of post-ERCP pancreatitis, estimated at 5% or 1 out of 20. The patient was advised that although most who develop post-ERCP pancreatitis have mild interstitial pancreatitis, some patients can develop severe necrotizing pancreatitis which could lead to the need for prolonged hospitalization, the need for surgery, the need for antibiotics, the need for blood products, or the potential for subsequent sepsis, multiorgan failure, or even death. The alternatives to ERCP including observation without testing, magnetic resonance cholangiopancreatography, surgery, or percutaneous cholangiopancreatography were reviewed as well as the benefits and limitations of each of the above alternatives.   After considering the options and having all their questions answered, the patient or their decision maker provided both verbal and written consent to proceed. Procedure in Detail:  After obtaining informed consent, positioning of the patient prone, and conduction of a pre-procedure pause or \"time out\" the endoscope was introduced into the mouth and advanced to the duodenum to visualize and instrument the ampullary opening. The ampulla is contained within a diverticulum. There is previous sphincterotomy noted. We found a plastic stent in position, it was removed. We cannulated with a balloon and injected contrast and we saw a filling defect in the middle portion of the common hepatic. The cystic stump does not fill. No leak appreciated. We used a 12mm balloon and 9mm balloon to try to sweep the bile duct - we could not get the balloon to exit the ampulla even if the stone is not engaged. I hypothesized that the sphincterotomy is not big enough to allow the balloon to pass. I engaged with a sphincterotome to try to enlarge the sphincterotomy but despite my efforts we could only get an additional 2-3mm of cut because beyond that the cutting wire would heavily engage the side wall of the duodenal diverticulum and I feared energy here would lead to perforation. I re-engaged with the stone removal balloon but again we could not get the balloon to pass through the ampulla even without the stone engaged. I elected to perform biliary sphincter dilation, accepting the theoretically increased risks for pancreatitis in favor of the benefits of stone removal without duodenal perforation from further attempts at extending the sphincterotomy. An 8mm balloon was used and we inflated just until the waist disappeared and removed the balloon. After this, we were able to sweep out the stone with a 12mm balloon. We watched and contrast flowed from the biliary tree.   We swept x2 more and performed occlusive cholangiography without additional filling defects seen. At this point the procedure was judged complete and all instruments removed  OF note, I interpreted the fluoroscopic imagery in real time without a radiologist in suite. Specimens: none           Recommendations: Recover, discharge if pain free. Follow up PRN. Thank you for entrusting me with this patient's care. Please do not hesitate to contact me with any questions or if I can be of assistance with any of your other patients' GI needs. Signed By: Jackson Vazquez MD                        November 13, 2019     Surgical assistant none. Implants none unless specified.

## 2019-11-13 NOTE — ANESTHESIA POSTPROCEDURE EVALUATION
Procedure(s):  ENDOSCOPIC RETROGRADE CHOLANGIOPANCREATOGRAPHY (ERCP) fluoro   ENDOSCOPY WITH PROSTHESIS OR STENT REMOVAL  ENDOSCOPIC SPHINCTEROTOMY  ENDOSCOPIC STONE EXTRACTION/BALLOON SWEEP. general    Anesthesia Post Evaluation      Multimodal analgesia: multimodal analgesia not used between 6 hours prior to anesthesia start to PACU discharge  Patient location during evaluation: PACU  Patient participation: complete - patient participated  Level of consciousness: awake  Pain management: adequate  Airway patency: patent  Anesthetic complications: no  Cardiovascular status: acceptable, blood pressure returned to baseline and hemodynamically stable  Respiratory status: acceptable  Hydration status: acceptable  Post anesthesia nausea and vomiting:  controlled      Vitals Value Taken Time   /106 11/13/2019  9:08 AM   Temp 36.7 °C (98 °F) 11/13/2019  8:53 AM   Pulse 76 11/13/2019  9:12 AM   Resp 15 11/13/2019  9:12 AM   SpO2 99 % 11/13/2019  9:12 AM   Vitals shown include unvalidated device data.

## 2019-11-13 NOTE — ROUTINE PROCESS
Cat Graf  1960  [id number]    Situation:  Verbal report received from: Zayda Mcdermott RN  Procedure: Procedure(s):  ENDOSCOPIC RETROGRADE CHOLANGIOPANCREATOGRAPHY (ERCP) fluoro   ENDOSCOPY WITH PROSTHESIS OR STENT REMOVAL  ENDOSCOPIC SPHINCTEROTOMY  ENDOSCOPIC STONE EXTRACTION/BALLOON SWEEP    Background:    Preoperative diagnosis: CHOLELITHIASIS WITH OBSTRUCTION  Postoperative diagnosis: Choledocolithiasis    :  Dr. Joelle Salmeron  Assistant(s): Endoscopy Technician-1: Kadeem Olsen  Endoscopy RN-1: Victoriano Zamora RN    Specimens: * No specimens in log *  H. Pylori  no    Assessment:  Intra-procedure medications   Anesthesia gave intra-procedure sedation and medications, see anesthesia flow sheet yes    Intravenous fluids: NS@ KVO     Vital signs stable     Abdominal assessment: round and soft     Recommendation:  Discharge patient per MD order.   Family or Friend   Permission to share finding with family or friend yes

## 2019-11-13 NOTE — ANESTHESIA PREPROCEDURE EVALUATION
Anesthetic History   No history of anesthetic complications            Review of Systems / Medical History  Patient summary reviewed and pertinent labs reviewed    Pulmonary  Within defined limits                 Neuro/Psych   Within defined limits           Cardiovascular    Hypertension              Exercise tolerance: >4 METS     GI/Hepatic/Renal  Within defined limits             Comments: Had biliary stent now here for removal Endo/Other    Diabetes: well controlled, type 2    Obesity     Other Findings            Physical Exam    Airway  Mallampati: II    Neck ROM: normal range of motion   Mouth opening: Normal     Cardiovascular    Rhythm: regular  Rate: normal         Dental  No notable dental hx       Pulmonary  Breath sounds clear to auscultation               Abdominal  GI exam deferred       Other Findings            Anesthetic Plan    ASA: 2  Anesthesia type: general          Induction: Intravenous  Anesthetic plan and risks discussed with: Patient

## 2019-11-13 NOTE — PERIOP NOTES
Report from [facility], CRNA, see anesthesia record. ABD remains soft and non-tender post procedure. Pt has no complaints at this time and tolerated the procedure well. Endoscope was pre-cleaned at bedside immediately following procedure by Man Luna.

## 2019-11-13 NOTE — DISCHARGE INSTRUCTIONS
[address] [facility] ALISTAIR Colon MD  [phone]      November 13, 2019     Mark Rahman  YOB: 1960    ENDOSCOPY DISCHARGE INSTRUCTIONS    If there is redness at IV site you should apply warm compress to area. If redness or soreness persist contact Dr. Cyrus Colon' or your primary care doctor. Gaseous discomfort may develop, but walking, belching will help relieve this. You may not operate a vehicle for 12 hours  You may not operate machinery or dangerous appliances for rest of today  You may not drink alcoholic beverages for 12 hours  Avoid making any critical decisions for 24 hours    DIET:  You may resume your normal diet, but some patients find that heavy or large meals may lead to indigestion or vomiting. I suggest a light meal as first food intake. MEDICATIONS:  The use of some over-the-counter pain medication may lead to bleeding after biopsies or other procedures you may have had done. Tylenol (also called acetaminophen) is safe to take and will not lead to bleeding. Based on the procedure you had today you may not safely take aspirin or aspirin-like products for the next ten (10) days. ACTIVITY:  You may resume your normal household activities, but it is recommended that you spend the remainder of the day resting -  avoid any strenuous activity. CALL DR. Cadence Mancera' OFFICE IF:  Increasing pain, nausea, vomiting  Abdominal distension (swelling)  Significant new or increased bleeding (oral or rectal)  Fever/Chills  Chest pain/shortness of breath                   Additional instructions:   No aspirin 10 days. We removed the stent and discovered a stone. Not sure if I missed it last time or perhaps it came out of the remnant of the gallbladder but we were able to clean it out and the bile duct is flowing properly. You can see me again as needed.   Call or go to ER if there is any significant abdominal pain after today's procedure. It was an honor to be your doctor today. Please let me or my office staff know if you have any feedback about today's procedure.     Birgit Turpin MD

## 2019-11-13 NOTE — PERIOP NOTES
Compression sequential device and sleeves applied per physician order. Patient has been educated and procedure has been explained. Sleeves applied to Bilat lower extremeties.

## 2019-11-13 NOTE — INTERVAL H&P NOTE
Pre-Endoscopy H&P Update  Chief complaint/HPI/ROS:  The indication for the procedure, the patient's history and the patient's current medications are reviewed prior to the procedure and that data is reported on the H&P to which this document is attached. Any significant complaints with regard to organ systems will be noted, and if not mentioned then a review of systems is not contributory. Past Medical History:   Diagnosis Date    Hypertension     Type 2 diabetes mellitus ([facility] [address]       Past Surgical History:   Procedure Laterality Date    HX CHOLECYSTECTOMY      HX ERCP      HX LAP CHOLECYSTECTOMY  08/11/2019     Social   Social History     Tobacco Use    Smoking status: Never Smoker    Smokeless tobacco: Never Used   Substance Use Topics    Alcohol use: Yes     Alcohol/week: 1.0 standard drinks     Types: 1 Glasses of wine per week     Comment: 1-3 drinks per month      Family History   Problem Relation Age of Onset    Diabetes Mother         type 2    Hypertension Mother     Diabetes Sister     Congenital heart defect Maternal Grandmother       No Known Allergies   Prior to Admission Medications   Prescriptions Last Dose Informant Patient Reported? Taking? ONE TOUCH DELICA 33 gauge [facility] 62/22/8912 at Unknown time Self Yes Yes   Sig: CHECK FASTING BLOOD SUGARS DAILY   ONETOUCH ULTRAMINI monitoring kit 11/12/2019 at Unknown time Self No Yes   Sig: use as directed   glucose blood VI test strips (ASCENSIA AUTODISC VI, ONE TOUCH ULTRA TEST VI) strip 11/12/2019 at Unknown time Self No Yes   Sig: Check fasting blood sugars daily. Contour Next one.   lancets misc 11/12/2019 at Unknown time Self No Yes   Sig: Check fasting blood sugars daily. metFORMIN (GLUCOPHAGE) 500 mg tablet 11/12/2019 at Unknown time Self No Yes   Sig: Take 1 Tab by mouth two (2) times daily (with meals).    valsartan-hydroCHLOROthiazide (DIOVAN-HCT) 160-12.5 mg per tablet 11/12/2019 at Unknown time  No Yes   Sig: Take 1 Tab by mouth daily. Facility-Administered Medications: None       PHYSICAL EXAM:  The patient is examined immediately prior to the procedure. Visit Vitals  /87   Pulse 89   Temp 98.4 °F (36.9 °C)   Resp 18   Ht 5' 2\" (1.575 m)   Wt 83.1 kg (183 lb 3.2 oz)   SpO2 97%   Breastfeeding? No   BMI 33.51 kg/m²     Gen: Appears comfortable, no distress. Pulm: comfortable respirations with no abnormal audible breath sounds  HEART: well perfused, no abnormal audible heart sounds  GI: abdomen flat. PLAN:  Informed consent discussion held, patient afforded an opportunity to ask questions and all questions answered. After being advised of the risks, benefits, and alternatives, the patient requested that we proceed and indicated so on a written consent form. Will proceed with procedure as planned.   Rashida Keene MD

## 2019-12-20 DIAGNOSIS — E11.8 TYPE 2 DIABETES MELLITUS WITH COMPLICATION, WITHOUT LONG-TERM CURRENT USE OF INSULIN (HCC): ICD-10-CM

## 2019-12-20 RX ORDER — METFORMIN HYDROCHLORIDE 500 MG/1
500 TABLET ORAL 2 TIMES DAILY WITH MEALS
Qty: 180 TAB | Refills: 0 | Status: SHIPPED | OUTPATIENT
Start: 2019-12-20 | End: 2020-03-12 | Stop reason: SDUPTHER

## 2020-01-07 DIAGNOSIS — I10 ESSENTIAL HYPERTENSION: ICD-10-CM

## 2020-01-07 DIAGNOSIS — E78.5 HYPERLIPIDEMIA, UNSPECIFIED HYPERLIPIDEMIA TYPE: ICD-10-CM

## 2020-01-07 DIAGNOSIS — E11.69 TYPE 2 DIABETES MELLITUS WITH OTHER SPECIFIED COMPLICATION, WITHOUT LONG-TERM CURRENT USE OF INSULIN (HCC): ICD-10-CM

## 2020-01-08 ENCOUNTER — OFFICE VISIT (OUTPATIENT)
Dept: INTERNAL MEDICINE CLINIC | Age: 60
End: 2020-01-08

## 2020-01-08 ENCOUNTER — HOSPITAL ENCOUNTER (OUTPATIENT)
Dept: LAB | Age: 60
Discharge: HOME | End: 2020-01-08

## 2020-01-08 VITALS
SYSTOLIC BLOOD PRESSURE: 132 MMHG | HEIGHT: 62 IN | BODY MASS INDEX: 34.04 KG/M2 | DIASTOLIC BLOOD PRESSURE: 92 MMHG | HEART RATE: 100 BPM | RESPIRATION RATE: 16 BRPM | OXYGEN SATURATION: 97 % | WEIGHT: 185 LBS | TEMPERATURE: 98.1 F

## 2020-01-08 DIAGNOSIS — E11.21 TYPE 2 DIABETES WITH NEPHROPATHY (HCC): ICD-10-CM

## 2020-01-08 DIAGNOSIS — E66.9 CLASS 1 OBESITY IN ADULT, UNSPECIFIED BMI, UNSPECIFIED OBESITY TYPE, UNSPECIFIED WHETHER SERIOUS COMORBIDITY PRESENT: ICD-10-CM

## 2020-01-08 DIAGNOSIS — Z11.59 ENCOUNTER FOR HEPATITIS C SCREENING TEST FOR LOW RISK PATIENT: ICD-10-CM

## 2020-01-08 DIAGNOSIS — I10 ESSENTIAL HYPERTENSION: ICD-10-CM

## 2020-01-08 DIAGNOSIS — Z00.00 WELLNESS EXAMINATION: Primary | ICD-10-CM

## 2020-01-08 LAB
ALBUMIN SERPL-MCNC: 4.2 G/DL (ref 3.5–5)
ALBUMIN/GLOB SERPL: 1.2 {RATIO} (ref 1.1–2.2)
ALP SERPL-CCNC: 87 U/L (ref 45–117)
ALT SERPL-CCNC: 54 U/L (ref 12–78)
ANION GAP SERPL CALC-SCNC: 8 MMOL/L (ref 5–15)
AST SERPL-CCNC: 40 U/L (ref 15–37)
BILIRUB SERPL-MCNC: 0.9 MG/DL (ref 0.2–1)
BUN SERPL-MCNC: 15 MG/DL (ref 6–20)
BUN/CREAT SERPL: 18 (ref 12–20)
CALCIUM SERPL-MCNC: 9.2 MG/DL (ref 8.5–10.1)
CHLORIDE SERPL-SCNC: 104 MMOL/L (ref 97–108)
CHOLEST SERPL-MCNC: 205 MG/DL
CO2 SERPL-SCNC: 26 MMOL/L (ref 21–32)
CREAT SERPL-MCNC: 0.83 MG/DL (ref 0.55–1.02)
EST. AVERAGE GLUCOSE BLD GHB EST-MCNC: 143 MG/DL
GLOBULIN SER CALC-MCNC: 3.5 G/DL (ref 2–4)
GLUCOSE SERPL-MCNC: 119 MG/DL (ref 65–100)
HBA1C MFR BLD: 6.6 % (ref 4–5.6)
HCV AB SERPL QL IA: NONREACTIVE
HCV COMMENT,HCGAC: NORMAL
HDLC SERPL-MCNC: 54 MG/DL
HDLC SERPL: 3.8 {RATIO} (ref 0–5)
LDLC SERPL CALC-MCNC: 125.4 MG/DL (ref 0–100)
LIPID PROFILE,FLP: ABNORMAL
POTASSIUM SERPL-SCNC: 3.9 MMOL/L (ref 3.5–5.1)
PROT SERPL-MCNC: 7.7 G/DL (ref 6.4–8.2)
SODIUM SERPL-SCNC: 138 MMOL/L (ref 136–145)
TRIGL SERPL-MCNC: 128 MG/DL (ref ?–150)
VLDLC SERPL CALC-MCNC: 25.6 MG/DL

## 2020-01-08 NOTE — PROGRESS NOTES
Eye exams: [facility].      GYN: not established anywhere     Shingles vaccine: pt has been educated on new shingrix and where to obtain inj.

## 2020-01-08 NOTE — PROGRESS NOTES
Zay Cabrales is a 61 y.o. female who presents today for Complete Physical  .      She has a history of   Patient Active Problem List   Diagnosis Code    Acute cholecystitis due to biliary calculus K80.00    Hypertension I10    S/P cholecystectomy Z90.49    Acute cholecystitis K81.0    Class 1 obesity in adult E66.9    Choledocholithiasis K80.50    DM (diabetes mellitus), type 2 ([facility] [address] E11.9    Type 2 diabetes with nephropathy ([facility] [address] E11.21   . Today patient is here for complete physical exam..   she does not have other concerns. Hypertension - home readings better. Diastolic occasionally elevated  Hypertension ROS: taking medications as instructed, no medication side effects noted, no TIA's, no chest pain on exertion, no dyspnea on exertion, no swelling of ankles     reports that she has never smoked. She has never used smokeless tobacco.    reports current alcohol use of about 1.0 standard drinks of alcohol per week. BP Readings from Last 2 Encounters:   01/08/20 (!) 132/92   11/13/19 141/78     Diabetes Mellitus [id number] she notes she feels great. Last hemoglobin a1c   Lab Results   Component Value Date/Time    Hemoglobin A1c 8.1 (H) 08/13/2019 08:12 PM     No components found for: POCA1C, HBA1C POC  medication compliance: compliant all of the time. Diabetic diet compliance: compliant most of the time. Home glucose monitoring: fasting values range 90's to 170. [place] Hypoglycemic episodes:  none. Further diabetic ROS: no polyuria or polydipsia, no chest pain, dyspnea or TIA's, no numbness, tingling or pain in extremities. Diabetes Complications: Proteinuria. Microalbuminuria:   Lab Results   Component Value Date/Time    Microalb/Creat ratio (ug/mg creat.) 44.4 (H) 09/11/2019 10:02 AM     Hyperlipidemia -deferred starting a statin.   Will recheck lipids  Lab Results   Component Value Date/Time    Cholesterol, total 185 09/11/2019 10:02 AM    HDL Cholesterol 40 09/11/2019 10:02 AM    LDL, calculated 127 (H) 09/11/2019 10:02 AM    VLDL, calculated 18 09/11/2019 10:02 AM    Triglyceride 92 09/11/2019 10:02 AM         Health maintenance hx includes:  Exercise: moderately active. Form of exercise: Walking. 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 Lives at home with . 4 children. 6 Grandchildren. Screening:    Colon cancer screening:  Last Colonoscopy: 2015 and   was abnormal: 5 years   Breast cancer screening: last mammogram 2019 and   was normal   Cervical cancer screening: last PAP/Pelvic exam: [facility]. Dr. Buck. We will get these records. Osteoporosis screening:  N/A      Immunizations:     Immunization History   Administered Date(s) Administered    Influenza Vaccine (Quad) PF 10/07/2019    Tdap 08/22/2019    Zoster Recombinant 01/08/2020      Immunization status: up to date and documented. ROS  Review of Systems   Constitutional: Negative for chills, fever and weight loss. HENT: Negative for congestion and sore throat. Eyes: Negative for blurred vision, double vision and photophobia. Respiratory: Negative for cough and shortness of breath. Cardiovascular: Negative for chest pain, palpitations and leg swelling. Gastrointestinal: Negative for abdominal pain, constipation, diarrhea, heartburn, nausea and vomiting. Genitourinary: Negative for dysuria, frequency and urgency. Musculoskeletal: Negative for joint pain and myalgias. Skin: Negative for rash. Neurological: Negative. Negative for headaches. Endo/Heme/Allergies: Does not bruise/bleed easily. Psychiatric/Behavioral: Negative for memory loss and suicidal ideas.        Visit Vitals  BP (!) 132/92   Pulse 100   Temp 98.1 °F (36.7 °C) (Oral)   Resp 16   Ht 5' 2\" (1.575 m)   Wt 185 lb (83.9 kg)   SpO2 97%   BMI 33.84 kg/m² Physical Exam  Constitutional:       General: She is not in acute distress. Appearance: She is well-developed. HENT:      Head: Normocephalic and atraumatic. Neck:      Musculoskeletal: Normal range of motion and neck supple. Thyroid: No thyromegaly. Cardiovascular:      Rate and Rhythm: Normal rate and regular rhythm. Heart sounds: No murmur. Pulmonary:      Effort: Pulmonary effort is normal.      Breath sounds: Normal breath sounds. No wheezing. Abdominal:      General: Bowel sounds are normal. There is no distension. Palpations: Abdomen is soft. Skin:     General: Skin is warm and dry. Neurological:      Mental Status: She is alert and oriented to person, place, and time. Cranial Nerves: No cranial nerve deficit. Psychiatric:         Behavior: Behavior normal.           Current Outpatient Medications   Medication Sig    metFORMIN (GLUCOPHAGE) 500 mg tablet Take 1 Tab by mouth two (2) times daily (with meals).  valsartan-hydroCHLOROthiazide (DIOVAN-HCT) 160-12.5 mg per tablet Take 1 Tab by mouth daily.  ONE TOUCH DELICA 33 gauge misc CHECK FASTING BLOOD SUGARS DAILY    lancets misc Check fasting blood sugars daily.  glucose blood VI test strips (ASCENSIA AUTODISC VI, ONE TOUCH ULTRA TEST VI) strip Check fasting blood sugars daily. Contour Next one.  ONETOUCH ULTRAMINI monitoring kit use as directed     No current facility-administered medications for this visit. Past Medical History:   Diagnosis Date    Hypertension     Type 2 diabetes mellitus ([address]       Past Surgical History:   Procedure Laterality Date    HX CHOLECYSTECTOMY      HX ERCP      HX LAP CHOLECYSTECTOMY  08/11/2019      Social History     Tobacco Use    Smoking status: Never Smoker    Smokeless tobacco: Never Used   Substance Use Topics    Alcohol use:  Yes     Alcohol/week: 1.0 standard drinks     Types: 1 Glasses of wine per week     Comment: 1-3 drinks per month Family History   Problem Relation Age of Onset    Diabetes Mother         type 2    Hypertension Mother     Diabetes Sister     Congenital heart defect Maternal Grandmother         No Known Allergies     Assessment/Plan  Diagnoses and all orders for this visit:    1. Wellness examination -we will try to obtain old GYN records Elsy Hernandez was counseled on age-appropriate/ guideline-based risk prevention behaviors and screening for a 61y.o. year old   female . We also discussed adjustments in screening based on family history if necessary. Printed instructions for preventative screening guidelines and healthy behaviors given to patient with after visit summary. 2. Essential hypertension -borderline blood pressure. Planning on working on diet and exercise. She will let us know the next 2 to 3 months what her blood pressure does. If diastolic remains elevated will increase ARB dose    3. Type 2 diabetes with nephropathy (HCC) -repeat A1c. Fasting blood sugars seem to be near goal  -     HEMOGLOBIN A1C WITH EAG; Future  -     METABOLIC PANEL, COMPREHENSIVE; Future  -     LIPID PANEL; Future    4. Class 1 obesity in adult, unspecified BMI, unspecified obesity type, unspecified whether serious comorbidity present -patient has lost a bit of weight has maintained this through the holidays. Continue to work on diet and exercise. 5. Encounter for hepatitis C screening test for low risk patient  -     HEPATITIS C AB; Future        Follow-up and Dispositions    · Return in about 6 months (around 7/8/2020). Jesse Lai MD  1/8/2020    This note was created with the help of speech recognition software Nettie Bustamante) and may contain some 'sound alike' errors.

## 2020-02-10 ENCOUNTER — DOCUMENTATION ONLY (OUTPATIENT)
Dept: INTERNAL MEDICINE CLINIC | Age: 60
End: 2020-02-10

## 2020-02-10 NOTE — PROGRESS NOTES
Received medical records from [facility]. Records have been placed on Dr Souza's desk for review.

## 2020-02-24 DIAGNOSIS — E11.8 TYPE 2 DIABETES MELLITUS WITH COMPLICATION, WITHOUT LONG-TERM CURRENT USE OF INSULIN (HCC): ICD-10-CM

## 2020-03-03 ENCOUNTER — TELEPHONE (OUTPATIENT)
Dept: INTERNAL MEDICINE CLINIC | Age: 60
End: 2020-03-03

## 2020-03-03 RX ORDER — VALSARTAN AND HYDROCHLOROTHIAZIDE 160; 12.5 MG/1; MG/1
1 TABLET, FILM COATED ORAL DAILY
Qty: 30 TAB | Refills: 3 | Status: SHIPPED | OUTPATIENT
Start: 2020-03-03 | End: 2020-06-19

## 2020-03-03 NOTE — TELEPHONE ENCOUNTER
Dr. Gertrudis Dewitt: Today   Message Contents   Emiliano Lisa sent to [facility]  Phone Number: [phone]         Caller (if not patient): N/A   Best contact number(s): [phone]   Name of medication and dosage if known: Losartan HCL 12.5 mg   Is patient out of this medication (yes/no): Yes. Pharmacy name: [facility]. Pharmacy listed in chart? (yes/no): Yes.    Pharmacy phone number: N/A   Date of last visit: Wednesday, January 08, 20   Details to clarify the request: N/A

## 2020-03-04 NOTE — TELEPHONE ENCOUNTER
R/C to Pt and confirmed she has been taking valsartan-HCTZ, not losartan. Advised Pt Rx has been sent to pharmacy on 3/3/20.

## 2020-03-12 DIAGNOSIS — E11.8 TYPE 2 DIABETES MELLITUS WITH COMPLICATION, WITHOUT LONG-TERM CURRENT USE OF INSULIN (HCC): ICD-10-CM

## 2020-03-12 RX ORDER — METFORMIN HYDROCHLORIDE 500 MG/1
500 TABLET ORAL 2 TIMES DAILY WITH MEALS
Qty: 180 TAB | Refills: 0 | Status: SHIPPED | OUTPATIENT
Start: 2020-03-12 | End: 2020-06-05

## 2020-04-06 ENCOUNTER — TELEPHONE (OUTPATIENT)
Dept: INTERNAL MEDICINE CLINIC | Age: 60
End: 2020-04-06

## 2020-04-06 NOTE — TELEPHONE ENCOUNTER
----- Message from Caitlin Cobb sent at 4/3/2020  5:16 PM EDT -----  Regarding: Dr. Sona Souza: [phone]  Appointment Request From: Raquel Velasco    With Provider: Mary Magana MD [-Primary Care Physician-]    Preferred Date Range: Any date 4/3/2020 or later    Preferred Times: Any    Reason: To address the following health maintenance concerns. Shingrix Vaccine Age 50>    Comments: This was taken care of March 10th At the [facility]. Please update with them my record. Mejia Blevins you for your assistance with this.

## 2020-06-05 DIAGNOSIS — E11.8 TYPE 2 DIABETES MELLITUS WITH COMPLICATION, WITHOUT LONG-TERM CURRENT USE OF INSULIN (HCC): ICD-10-CM

## 2020-06-05 RX ORDER — METFORMIN HYDROCHLORIDE 500 MG/1
TABLET ORAL
Qty: 180 TAB | Refills: 0 | Status: SHIPPED | OUTPATIENT
Start: 2020-06-05 | End: 2020-09-01

## 2020-06-19 RX ORDER — VALSARTAN AND HYDROCHLOROTHIAZIDE 160; 12.5 MG/1; MG/1
TABLET, FILM COATED ORAL
Qty: 30 TAB | Refills: 3 | Status: SHIPPED | OUTPATIENT
Start: 2020-06-19 | End: 2020-10-24

## 2020-10-24 RX ORDER — VALSARTAN AND HYDROCHLOROTHIAZIDE 160; 12.5 MG/1; MG/1
TABLET, FILM COATED ORAL
Qty: 30 TAB | Refills: 3 | Status: SHIPPED | OUTPATIENT
Start: 2020-10-24 | End: 2021-02-20

## 2021-02-20 DIAGNOSIS — E11.8 TYPE 2 DIABETES MELLITUS WITH COMPLICATION, WITHOUT LONG-TERM CURRENT USE OF INSULIN (HCC): ICD-10-CM

## 2021-02-20 RX ORDER — METFORMIN HYDROCHLORIDE 500 MG/1
TABLET ORAL
Qty: 60 TAB | Refills: 0 | Status: SHIPPED | OUTPATIENT
Start: 2021-02-20 | End: 2021-04-14 | Stop reason: SDUPTHER

## 2021-02-20 RX ORDER — VALSARTAN AND HYDROCHLOROTHIAZIDE 160; 12.5 MG/1; MG/1
TABLET, FILM COATED ORAL
Qty: 30 TAB | Refills: 0 | Status: SHIPPED | OUTPATIENT
Start: 2021-02-20 | End: 2021-04-14 | Stop reason: SDUPTHER

## 2021-02-22 ENCOUNTER — PATIENT MESSAGE (OUTPATIENT)
Dept: INTERNAL MEDICINE CLINIC | Age: 61
End: 2021-02-22

## 2021-02-22 NOTE — TELEPHONE ENCOUNTER
VM left for patient along with My Chart message to schedule an appt before any additional refills can be processed.

## 2021-04-14 ENCOUNTER — OFFICE VISIT (OUTPATIENT)
Dept: INTERNAL MEDICINE CLINIC | Age: 61
End: 2021-04-14
Payer: COMMERCIAL

## 2021-04-14 VITALS
WEIGHT: 178 LBS | TEMPERATURE: 98.8 F | SYSTOLIC BLOOD PRESSURE: 132 MMHG | OXYGEN SATURATION: 98 % | BODY MASS INDEX: 32.76 KG/M2 | RESPIRATION RATE: 18 BRPM | HEART RATE: 96 BPM | DIASTOLIC BLOOD PRESSURE: 82 MMHG | HEIGHT: 62 IN

## 2021-04-14 DIAGNOSIS — E78.5 HYPERLIPIDEMIA, UNSPECIFIED HYPERLIPIDEMIA TYPE: ICD-10-CM

## 2021-04-14 DIAGNOSIS — I10 ESSENTIAL HYPERTENSION: ICD-10-CM

## 2021-04-14 DIAGNOSIS — E11.8 TYPE 2 DIABETES MELLITUS WITH COMPLICATION, WITHOUT LONG-TERM CURRENT USE OF INSULIN (HCC): ICD-10-CM

## 2021-04-14 DIAGNOSIS — Z00.00 WELLNESS EXAMINATION: Primary | ICD-10-CM

## 2021-04-14 DIAGNOSIS — E55.9 VITAMIN D DEFICIENCY: ICD-10-CM

## 2021-04-14 DIAGNOSIS — Z01.419 WELL WOMAN EXAM: ICD-10-CM

## 2021-04-14 PROCEDURE — 99396 PREV VISIT EST AGE 40-64: CPT | Performed by: INTERNAL MEDICINE

## 2021-04-14 RX ORDER — VALSARTAN AND HYDROCHLOROTHIAZIDE 160; 12.5 MG/1; MG/1
TABLET, FILM COATED ORAL
Qty: 90 TAB | Refills: 1 | Status: SHIPPED | OUTPATIENT
Start: 2021-04-14 | End: 2021-10-11

## 2021-04-14 RX ORDER — METFORMIN HYDROCHLORIDE 500 MG/1
TABLET ORAL
Qty: 180 TAB | Refills: 2 | Status: SHIPPED | OUTPATIENT
Start: 2021-04-14 | End: 2022-01-09

## 2021-04-14 NOTE — PROGRESS NOTES
Nahun Oswald is a 61 y.o. female who presents today for Complete Physical  .      She has a history of   Patient Active Problem List   Diagnosis Code    Acute cholecystitis due to biliary calculus K80.00    Hypertension I10    S/P cholecystectomy Z90.49    Acute cholecystitis K81.0    Class 1 obesity in adult E66.9    Choledocholithiasis K80.50    DM (diabetes mellitus), type 2 ([facility] [address] E11.9    Type 2 diabetes with nephropathy ([facility] [address] E11.21   . Today patient is here for complete physical exam.. Spent more time in [place] due to moving her mother from there. Hypertension- on combo. Hypertension ROS: taking medications as instructed, no medication side effects noted, no TIA's, no chest pain on exertion, no dyspnea on exertion, no swelling of ankles     reports that she has never smoked. She has never used smokeless tobacco.    reports current alcohol use of about 1.0 standard drinks of alcohol per week. BP Readings from Last 2 Encounters:   04/14/21 132/82   01/08/20 (!) 132/92     Diabetes Mellitus follow-up- BG are pretty well controlled. Last hemoglobin a1c   Lab Results   Component Value Date/Time    Hemoglobin A1c 6.6 (H) 01/08/2020 11:19 AM     No components found for: POCA1C, HBA1C POC  medication compliance: compliant all of the time. Diabetic diet compliance: compliant most of the time. Home glucose monitoring: fasting values range 100-120. Hypoglycemic episodes:  none. Further diabetic ROS: no polyuria or polydipsia, no chest pain, dyspnea or TIA's, no numbness, tingling or pain in extremities. Microalbuminuria:   Lab Results   Component Value Date/Time    Microalb/Creat ratio (ug/mg creat.) 44.4 (H) 09/11/2019 10:02 AM       Health maintenance hx includes:  Exercise: moderately active. Form of exercise: Walking. 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                 Lives at home with .                4 children.                6 Grandchildren. Screening:    Colon cancer screening:  Last Colonoscopy: 2015 and   was abnormal: Had 2 polyps but I do not see referral request for 5 or 10-year follow-up. Done with Dr. Chelsi Oakley   Breast cancer screening: last mammogram 2019 and   was normal   Cervical cancer screening: last PAP/Pelvic exam: 2015   and was normal.   Osteoporosis screening:  Last BMD: N/A      Immunizations:     Immunization History   Administered Date(s) Administered    COVID-19, PFIZER, MRNA, LNP-S, PF, 30MCG/0.3ML DOSE 04/03/2021    Influenza Vaccine VerticalResponse) PF (>6 Mo Flulaval, Fluarix, and >3 Yrs Afluria, Fluzone 94244) 10/07/2019, 12/08/2020    Pneumococcal Polysaccharide (PPSV-23) 03/13/2020    Tdap 08/22/2019    Zoster Recombinant 01/08/2020, 04/03/2020      Immunization status: UTD  . ROS  ROS    Visit Vitals  /82   Pulse 96   Temp 98.8 °F (37.1 °C) (Oral)   Resp 18   Ht 5' 2\" (1.575 m)   Wt 178 lb (80.7 kg)   SpO2 98%   BMI 32.56 kg/m²       Physical Exam  Exam conducted with a chaperone present. Constitutional:       General: She is not in acute distress. Appearance: She is well-developed. HENT:      Head: Normocephalic and atraumatic. Neck:      Musculoskeletal: Normal range of motion and neck supple. Thyroid: No thyromegaly. Cardiovascular:      Rate and Rhythm: Normal rate and regular rhythm. Heart sounds: No murmur. Pulmonary:      Effort: Pulmonary effort is normal.      Breath sounds: Normal breath sounds. No wheezing. Abdominal:      General: Bowel sounds are normal. There is no distension. Palpations: Abdomen is soft. Genitourinary:     Exam position: Supine. Labia:         Right: No rash. Left: No rash. Vagina: Normal.      Cervix: Normal.      Adnexa: Right adnexa normal and left adnexa normal.   Skin:     General: Skin is warm and dry.    Neurological:      Mental Status: She is alert and oriented to person, place, and time. Cranial Nerves: No cranial nerve deficit. Psychiatric:         Behavior: Behavior normal.           Current Outpatient Medications   Medication Sig    valsartan-hydroCHLOROthiazide (DIOVAN-HCT) 160-12.5 mg per tablet take 1 tablet by mouth once daily    metFORMIN (GLUCOPHAGE) 500 mg tablet take 1 tablet by mouth with meals twice a day    OneTouch Delica Plus Lancet 33 gauge misc CHECK FASTING BLOOD SUGARS DAILY    glucose blood VI test strips (ASCENSIA AUTODISC VI, ONE TOUCH ULTRA TEST VI) strip Check fasting blood sugars daily. Contour Next one.  lancets misc Check fasting blood sugars daily.  ONETOUCH ULTRAMINI monitoring kit use as directed     No current facility-administered medications for this visit. Past Medical History:   Diagnosis Date    Hypertension     Type 2 diabetes mellitus ([facility] [address]       Past Surgical History:   Procedure Laterality Date    HX CHOLECYSTECTOMY      HX ERCP      HX LAP CHOLECYSTECTOMY  08/11/2019      Social History     Tobacco Use    Smoking status: Never Smoker    Smokeless tobacco: Never Used   Substance Use Topics    Alcohol use: Yes     Alcohol/week: 1.0 standard drinks     Types: 1 Glasses of wine per week     Comment: 1-3 drinks per month      Family History   Problem Relation Age of Onset    Diabetes Mother         type 2    Hypertension Mother     Diabetes Sister     Congenital heart defect Maternal Grandmother         No Known Allergies     Assessment/Plan  Diagnoses and all orders for this visit:    1. Wellness examination- Edwina Parents was counseled on age-appropriate/ guideline-based risk prevention behaviors and screening for a 61y.o. year old   female . We also discussed adjustments in screening based on family history if necessary.    Printed instructions for preventative screening guidelines and healthy behaviors given to patient with after visit summary.    -     REFERRAL TO GASTROENTEROLOGY  - [facility] MAMMO BI SCREENING INCL CAD; Future    2. Type 2 diabetes mellitus with complication, without long-term current use of insulin (HCC) -we discussed stopping Metformin if her A1c is below 6.5.  -     HEMOGLOBIN A1C WITH EAG; Future  -     MICROALBUMIN, UR, RAND W/ MICROALB/CREAT RATIO; Future  -     metFORMIN (GLUCOPHAGE) 500 mg tablet; take 1 tablet by mouth with meals twice a day    3. Essential hypertension-well-controlled. -     METABOLIC PANEL, COMPREHENSIVE; Future  -     valsartan-hydroCHLOROthiazide (DIOVAN-HCT) 160-12.5 mg per tablet; take 1 tablet by mouth once daily    4. Hyperlipidemia, unspecified hyperlipidemia type-given weight loss, cholesterol likely improved. -     LIPID PANEL; Future    5. Vitamin D deficiency  -     VITAMIN D, 25 HYDROXY; Future    6. Well woman exam  -     PAP IG, APTIMA HPV AND RFX 16/18,45 ([id number]); Future            Deanna Bentley MD  4/14/2021    This note was created with the help of speech recognition software Jordy Blake) and may contain some 'sound alike' errors.

## 2021-04-14 NOTE — PATIENT INSTRUCTIONS

## 2021-04-15 LAB
25(OH)D3+25(OH)D2 SERPL-MCNC: 22.7 NG/ML (ref 30–100)
ALBUMIN SERPL-MCNC: 4.5 G/DL (ref 3.8–4.9)
ALBUMIN/CREAT UR: 6 MG/G CREAT (ref 0–29)
ALBUMIN/GLOB SERPL: 1.7 {RATIO} (ref 1.2–2.2)
ALP SERPL-CCNC: 73 IU/L (ref 39–117)
ALT SERPL-CCNC: 40 IU/L (ref 0–32)
AST SERPL-CCNC: 36 IU/L (ref 0–40)
BILIRUB SERPL-MCNC: 1 MG/DL (ref 0–1.2)
BUN SERPL-MCNC: 16 MG/DL (ref 8–27)
BUN/CREAT SERPL: 20 (ref 12–28)
CALCIUM SERPL-MCNC: 9.5 MG/DL (ref 8.7–10.3)
CHLORIDE SERPL-SCNC: 101 MMOL/L (ref 96–106)
CHOLEST SERPL-MCNC: 185 MG/DL (ref 100–199)
CO2 SERPL-SCNC: 23 MMOL/L (ref 20–29)
CREAT SERPL-MCNC: 0.81 MG/DL (ref 0.57–1)
CREAT UR-MCNC: 53.5 MG/DL
EST. AVERAGE GLUCOSE BLD GHB EST-MCNC: 131 MG/DL
GLOBULIN SER CALC-MCNC: 2.6 G/DL (ref 1.5–4.5)
GLUCOSE SERPL-MCNC: 116 MG/DL (ref 65–99)
HBA1C MFR BLD: 6.2 % (ref 4.8–5.6)
HDLC SERPL-MCNC: 53 MG/DL
IMP & REVIEW OF LAB RESULTS: NORMAL
LDLC SERPL CALC-MCNC: 108 MG/DL (ref 0–99)
MICROALBUMIN UR-MCNC: 3.2 UG/ML
POTASSIUM SERPL-SCNC: 3.6 MMOL/L (ref 3.5–5.2)
PROT SERPL-MCNC: 7.1 G/DL (ref 6–8.5)
SODIUM SERPL-SCNC: 142 MMOL/L (ref 134–144)
TRIGL SERPL-MCNC: 136 MG/DL (ref 0–149)
VLDLC SERPL CALC-MCNC: 24 MG/DL (ref 5–40)

## 2021-04-19 ENCOUNTER — TELEPHONE (OUTPATIENT)
Dept: INTERNAL MEDICINE CLINIC | Age: 61
End: 2021-04-19

## 2021-04-19 LAB
CYTOLOGIST CVX/VAG CYTO: NORMAL
CYTOLOGY CVX/VAG DOC CYTO: NORMAL
CYTOLOGY CVX/VAG DOC THIN PREP: NORMAL
DX ICD CODE: NORMAL
HPV I/H RISK 4 DNA CVX QL PROBE+SIG AMP: NEGATIVE
Lab: NORMAL
OTHER STN SPEC: NORMAL
STAT OF ADQ CVX/VAG CYTO-IMP: NORMAL

## 2021-04-19 NOTE — TELEPHONE ENCOUNTER
Patient states referral was done incorrectly. It was billed to the wrong insurance as she has [employer]. Is also concerned her future referrals will be filed incorrectly as well. Patient's referral says michelle bolaños.

## 2021-04-19 NOTE — TELEPHONE ENCOUNTER
Sent patient a [place] message asking for clarification as we do have her insurance listed as [employer] as her card shows and her policy does not require a referral approved from insurance nor has one been obtained or requested.

## 2021-05-14 DIAGNOSIS — E11.8 TYPE 2 DIABETES MELLITUS WITH COMPLICATION, WITHOUT LONG-TERM CURRENT USE OF INSULIN (HCC): ICD-10-CM

## 2021-09-30 RX ORDER — LANCETS 33 GAUGE
EACH MISCELLANEOUS
Qty: 900 LANCET | Refills: 1 | Status: SHIPPED | OUTPATIENT
Start: 2021-09-30

## 2021-10-11 DIAGNOSIS — I10 ESSENTIAL HYPERTENSION: ICD-10-CM

## 2021-10-11 RX ORDER — VALSARTAN AND HYDROCHLOROTHIAZIDE 160; 12.5 MG/1; MG/1
TABLET, FILM COATED ORAL
Qty: 90 TABLET | Refills: 1 | Status: SHIPPED | OUTPATIENT
Start: 2021-10-11 | End: 2022-04-08

## 2021-11-27 ENCOUNTER — HOSPITAL ENCOUNTER (INPATIENT)
Dept: HOSPITAL 12 - ER | Age: 61
LOS: 2 days | Discharge: NURSING, REHAB OR LONG-TERM CARE | DRG: 202 | End: 2021-11-29
Attending: INTERNAL MEDICINE | Admitting: INTERNAL MEDICINE
Payer: MEDICARE

## 2021-11-27 VITALS — SYSTOLIC BLOOD PRESSURE: 157 MMHG | DIASTOLIC BLOOD PRESSURE: 82 MMHG

## 2021-11-27 VITALS — BODY MASS INDEX: 24.11 KG/M2 | WEIGHT: 150 LBS | HEIGHT: 66 IN

## 2021-11-27 VITALS — DIASTOLIC BLOOD PRESSURE: 78 MMHG | SYSTOLIC BLOOD PRESSURE: 170 MMHG

## 2021-11-27 VITALS — DIASTOLIC BLOOD PRESSURE: 56 MMHG | SYSTOLIC BLOOD PRESSURE: 128 MMHG

## 2021-11-27 DIAGNOSIS — Z88.5: ICD-10-CM

## 2021-11-27 DIAGNOSIS — Z79.899: ICD-10-CM

## 2021-11-27 DIAGNOSIS — E87.6: ICD-10-CM

## 2021-11-27 DIAGNOSIS — Z20.822: ICD-10-CM
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
## 2021-11-27 DIAGNOSIS — Z95.0: ICD-10-CM
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
## 2021-11-27 DIAGNOSIS — J20.9: Primary | ICD-10-CM

## 2021-11-27 DIAGNOSIS — F25.1: ICD-10-CM

## 2021-11-27 DIAGNOSIS — B85.2: ICD-10-CM

## 2021-11-27 DIAGNOSIS — S22.32XA: ICD-10-CM

## 2021-11-27 DIAGNOSIS — I10: ICD-10-CM

## 2021-11-27 DIAGNOSIS — Z91.041: ICD-10-CM

## 2021-11-27 DIAGNOSIS — F17.210: ICD-10-CM

## 2021-11-27 LAB
ALP SERPL-CCNC: 98 U/L (ref 50–136)
ALT SERPL W/O P-5'-P-CCNC: 19 U/L (ref 14–59)
AST SERPL-CCNC: 8 U/L (ref 15–37)
BILIRUB SERPL-MCNC: 0.2 MG/DL (ref 0.2–1)
BUN SERPL-MCNC: 11 MG/DL (ref 7–18)
CHLORIDE SERPL-SCNC: 103 MMOL/L (ref 98–107)
CO2 SERPL-SCNC: 28 MMOL/L (ref 21–32)
CREAT SERPL-MCNC: 0.9 MG/DL (ref 0.6–1.3)
GLUCOSE SERPL-MCNC: 107 MG/DL (ref 74–106)
HCT VFR BLD AUTO: 39.2 % (ref 31.2–41.9)
MCH RBC QN AUTO: 31.2 UUG (ref 24.7–32.8)
MCV RBC AUTO: 90.2 FL (ref 75.5–95.3)
PLATELET # BLD AUTO: 225 K/UL (ref 179–408)
POTASSIUM SERPL-SCNC: 3.7 MMOL/L (ref 3.5–5.1)
WS STN SPEC: 6.5 G/DL (ref 6.4–8.2)

## 2021-11-27 PROCEDURE — G0378 HOSPITAL OBSERVATION PER HR: HCPCS

## 2021-11-27 PROCEDURE — A4663 DIALYSIS BLOOD PRESSURE CUFF: HCPCS

## 2021-11-27 PROCEDURE — A9540 TC99M MAA: HCPCS

## 2021-11-27 RX ADMIN — AMLODIPINE BESYLATE SCH MG: 10 TABLET ORAL at 13:46

## 2021-11-27 RX ADMIN — DOCUSATE SODIUM SCH MG: 100 CAPSULE, LIQUID FILLED ORAL at 21:12

## 2021-11-27 RX ADMIN — MIRTAZAPINE SCH MG: 15 TABLET, FILM COATED ORAL at 21:00

## 2021-11-27 NOTE — NUR
Received patient in bed. AAO x4, able to make needs know. Patient upset that she has not 
received her medications. Requests to have Risperdal and Norvasc. Dr. Gasca paged and 
provided orders for Norvasc 10mg times once and Risperdal 1mg times once this evening. 
Patient denies any further hemoptysis, no SOB, on RA. Continues on Tele, NSR 60's HR. 
Remains on contact isolation for bed bugs, no  beds noted at this time. Denies any itching. 
All needs attended, call light within reach.

## 2021-11-27 NOTE — NUR
Received pt from ER onto the floor at 0930. Pt admitted for hemoptysis, coughing up red 
small amount of blood into tissue. Pt is a/o x 4, came from [facility] boarding 
and rehab. Pt is ambulatory with [facility] with standby assist, has a history of falls/ hip 
fracture in February. Pt is on room air saturating 97%, / 78 upon arrival. Pt is on 
contact isolation, bugs that appear like bed bugs found in personal belongings, ID and ER 
notified. Per pt statement, her residency is infested with bed bugs. Pt refused to have 
personal belongings thrown out, educated on risk of further infesting and contaminating. Pt 
stated that she would leave the hospital if we threw her things away. Double bagged in 
biohazard bag and placed in drawer. Pt's rapid COVID was negative, she is vaccinated x 2 as 
well as the booster. Lung sounds clear throughout upon auscultation. Call light within 
reach, Will continue to monitor pt.

## 2021-11-27 NOTE — NUR
Pt taken to 315 via gurney. Pt ambulated without assistance from gurney to bed. 
Hand-off to Domonique.

## 2021-11-28 VITALS — DIASTOLIC BLOOD PRESSURE: 75 MMHG | SYSTOLIC BLOOD PRESSURE: 157 MMHG

## 2021-11-28 VITALS — DIASTOLIC BLOOD PRESSURE: 72 MMHG | SYSTOLIC BLOOD PRESSURE: 147 MMHG

## 2021-11-28 VITALS — DIASTOLIC BLOOD PRESSURE: 49 MMHG | SYSTOLIC BLOOD PRESSURE: 138 MMHG

## 2021-11-28 VITALS — DIASTOLIC BLOOD PRESSURE: 63 MMHG | SYSTOLIC BLOOD PRESSURE: 145 MMHG

## 2021-11-28 VITALS — SYSTOLIC BLOOD PRESSURE: 166 MMHG | DIASTOLIC BLOOD PRESSURE: 75 MMHG

## 2021-11-28 LAB
ALP SERPL-CCNC: 96 U/L (ref 50–136)
ALT SERPL W/O P-5'-P-CCNC: 19 U/L (ref 14–59)
AST SERPL-CCNC: 7 U/L (ref 15–37)
BILIRUB SERPL-MCNC: 0.4 MG/DL (ref 0.2–1)
BUN SERPL-MCNC: 11 MG/DL (ref 7–18)
CHLORIDE SERPL-SCNC: 105 MMOL/L (ref 98–107)
CHOLEST SERPL-MCNC: 168 MG/DL (ref ?–200)
CO2 SERPL-SCNC: 27 MMOL/L (ref 21–32)
CREAT SERPL-MCNC: 0.8 MG/DL (ref 0.6–1.3)
GLUCOSE SERPL-MCNC: 121 MG/DL (ref 74–106)
HCT VFR BLD AUTO: 39.5 % (ref 31.2–41.9)
HDLC SERPL-MCNC: 47 MG/DL (ref 40–60)
IRON SERPL-MCNC: 45 UG/DL (ref 50–175)
MAGNESIUM SERPL-MCNC: 2.3 MG/DL (ref 1.8–2.4)
MCH RBC QN AUTO: 31.1 UUG (ref 24.7–32.8)
MCV RBC AUTO: 90.3 FL (ref 75.5–95.3)
PHOSPHATE SERPL-MCNC: 3.8 MG/DL (ref 2.5–4.9)
PLATELET # BLD AUTO: 238 K/UL (ref 179–408)
POTASSIUM SERPL-SCNC: 3.1 MMOL/L (ref 3.5–5.1)
TRIGL SERPL-MCNC: 164 MG/DL (ref 30–150)
TSH SERPL DL<=0.005 MIU/L-ACNC: 1.95 MIU/ML (ref 0.36–3.74)
WS STN SPEC: 6.3 G/DL (ref 6.4–8.2)

## 2021-11-28 RX ADMIN — DOCUSATE SODIUM SCH MG: 100 CAPSULE, LIQUID FILLED ORAL at 20:01

## 2021-11-28 RX ADMIN — PANTOPRAZOLE SODIUM SCH MG: 40 TABLET, DELAYED RELEASE ORAL at 06:13

## 2021-11-28 RX ADMIN — MIRTAZAPINE SCH MG: 15 TABLET, FILM COATED ORAL at 20:01

## 2021-11-28 RX ADMIN — SULFAMETHOXAZOLE AND TRIMETHOPRIM SCH TAB: 800; 160 TABLET ORAL at 13:58

## 2021-11-28 RX ADMIN — SULFAMETHOXAZOLE AND TRIMETHOPRIM SCH TAB: 800; 160 TABLET ORAL at 20:01

## 2021-11-28 RX ADMIN — CLONAZEPAM PRN MG: 1 TABLET ORAL at 16:10

## 2021-11-28 RX ADMIN — AMLODIPINE BESYLATE SCH MG: 10 TABLET ORAL at 08:53

## 2021-11-28 NOTE — NUR
resting in bed. no sob or pain. sr on monitor. on bactrim atb no adverse/allergic reaction 
noted. needs attended. safety precautions in place.

## 2021-11-28 NOTE — NUR
PT ASKED IF SHE WANTS TO TAKE A SHOWER. PT PREFER BED BATH. BED BATH DONE AND CHANGED 
COMPLETE LINEN.

## 2021-11-28 NOTE — NUR
Pt verbalized she feels anxious and wants something to calm her down. Informed dr. lee with 
order for klonopin 1mg q8h prn noted and carried out.

## 2021-11-28 NOTE — NUR
received awake and responsive. no resp distress. denies pain. slept well as verbalized. 
safety precautions in place. cont to monitor.

## 2021-11-28 NOTE — NUR
Patient verbalized still feeling anxious and request Dr. lee to increase dose. Per Dr. lee, give Klonopin 2mg po now x1  and order for psych consult noted and carried. Patient 
aware and agreed.

## 2021-11-28 NOTE — NUR
RECEIVED PT AWAKE, ALERT AND ORIENTEDX4. PT IN NO ACUTE DISTRESS. IV INTACT. SAFETY AND 
COMFORT PROVIDED. PT CAN MAKE HER NEEDS KNOWN. WILL CONTINUE TO MONITOR.

## 2021-11-28 NOTE — NUR
Slept well all night. Easily arousable, denies any pain or SOB during this shift. Still 
noted with occasional cough, non productive. On Telemetry, NSR with HR 63 , occasionally 
noted with non sustained bradycardia when in deep sleep. Safety measures continued, call 
light within reach.

## 2021-11-29 VITALS — DIASTOLIC BLOOD PRESSURE: 73 MMHG | SYSTOLIC BLOOD PRESSURE: 141 MMHG

## 2021-11-29 VITALS — SYSTOLIC BLOOD PRESSURE: 146 MMHG | DIASTOLIC BLOOD PRESSURE: 78 MMHG

## 2021-11-29 VITALS — SYSTOLIC BLOOD PRESSURE: 126 MMHG | DIASTOLIC BLOOD PRESSURE: 56 MMHG

## 2021-11-29 VITALS — DIASTOLIC BLOOD PRESSURE: 75 MMHG | SYSTOLIC BLOOD PRESSURE: 135 MMHG

## 2021-11-29 RX ADMIN — CLONAZEPAM PRN MG: 1 TABLET ORAL at 09:03

## 2021-11-29 RX ADMIN — SULFAMETHOXAZOLE AND TRIMETHOPRIM SCH TAB: 800; 160 TABLET ORAL at 09:02

## 2021-11-29 RX ADMIN — PANTOPRAZOLE SODIUM SCH MG: 40 TABLET, DELAYED RELEASE ORAL at 06:05

## 2021-11-29 RX ADMIN — CLONAZEPAM PRN MG: 1 TABLET ORAL at 16:38

## 2021-11-29 RX ADMIN — AMLODIPINE BESYLATE SCH MG: 10 TABLET ORAL at 09:03

## 2021-11-29 NOTE — NUR
received asleep resting comfortably. no acute distress. sr on monitor. no s/sx of pain or 
sob. kept comfortable. call light in reach.

## 2021-11-29 NOTE — NUR
pt is being discharged back to assisted living facility and agreed. she stated she has all 
her medications at home. per dr. lee to follow up with primary md and psych md relayed to 
pt and she verbalized understanding. no acute distress. sr on monitor throughout shift.

## 2021-11-29 NOTE — NUR
received call from [facility]. transportation set up and assisted living is aware. per carie, no 
need to give report.

## 2021-11-29 NOTE — NUR
picked up by 2 emts from [facility] ambulance. denies pain or sob. sr on monitor throughout the 
shift. iv removed no bleeding noted. pt eager to go home. verbalized understanding of 
discharge instructions. left via lew in stable condition.

-------------------------------------------------------------------------------

Addendum: 11/29/21 at 1753 by SANDI BOCANEGRA RN

-------------------------------------------------------------------------------

addendum: slight coughing but no hemoptysis throughout the shift.

## 2021-11-29 NOTE — NUR
PT SLEPT COMFORTABLY. PT IN NO ACUTE DISTRESS. PRESCRIBED MEDICATION GIVEN AND PT TOLERATED 
IT WELL. PT STABLE. VITAL SIGNS WITHIN NORMAL LIMIT. SAFETY AND COMFORT PROVIDED. WILL 
ENDORSE TO INCOMING NURSE FOR CONTINUITY OF CARE.

## 2022-01-09 DIAGNOSIS — E11.8 TYPE 2 DIABETES MELLITUS WITH COMPLICATION, WITHOUT LONG-TERM CURRENT USE OF INSULIN (HCC): ICD-10-CM

## 2022-01-09 RX ORDER — METFORMIN HYDROCHLORIDE 500 MG/1
TABLET ORAL
Qty: 180 TABLET | Refills: 2 | Status: SHIPPED | OUTPATIENT
Start: 2022-01-09 | End: 2022-10-11

## 2022-03-18 PROBLEM — E66.9 CLASS 1 OBESITY IN ADULT: Status: ACTIVE | Noted: 2019-08-22

## 2022-03-18 PROBLEM — Z90.49 S/P CHOLECYSTECTOMY: Status: ACTIVE | Noted: 2019-08-13

## 2022-03-18 PROBLEM — E11.21 TYPE 2 DIABETES WITH NEPHROPATHY (HCC): Status: ACTIVE | Noted: 2020-01-08

## 2022-03-18 PROBLEM — E66.811 CLASS 1 OBESITY IN ADULT: Status: ACTIVE | Noted: 2019-08-22

## 2022-03-19 PROBLEM — I10 HYPERTENSION: Status: ACTIVE | Noted: 2019-08-13

## 2022-03-19 PROBLEM — K81.0 ACUTE CHOLECYSTITIS: Status: ACTIVE | Noted: 2019-08-14

## 2022-03-19 PROBLEM — E11.9 DM (DIABETES MELLITUS), TYPE 2 (HCC): Status: ACTIVE | Noted: 2019-09-15

## 2022-03-19 PROBLEM — K80.50 CHOLEDOCHOLITHIASIS: Status: ACTIVE | Noted: 2019-09-13

## 2022-03-20 PROBLEM — K80.00 ACUTE CHOLECYSTITIS DUE TO BILIARY CALCULUS: Status: ACTIVE | Noted: 2019-08-11

## 2022-04-08 DIAGNOSIS — I10 ESSENTIAL HYPERTENSION: ICD-10-CM

## 2022-04-08 RX ORDER — VALSARTAN AND HYDROCHLOROTHIAZIDE 160; 12.5 MG/1; MG/1
TABLET, FILM COATED ORAL
Qty: 90 TABLET | Refills: 1 | Status: SHIPPED | OUTPATIENT
Start: 2022-04-08 | End: 2022-10-11

## 2022-06-13 DIAGNOSIS — E11.8 TYPE 2 DIABETES MELLITUS WITH COMPLICATION, WITHOUT LONG-TERM CURRENT USE OF INSULIN (HCC): ICD-10-CM

## 2022-06-14 RX ORDER — BLOOD SUGAR DIAGNOSTIC
STRIP MISCELLANEOUS
Qty: 100 STRIP | Refills: 11 | Status: SHIPPED | OUTPATIENT
Start: 2022-06-14

## 2022-10-11 DIAGNOSIS — I10 ESSENTIAL HYPERTENSION: ICD-10-CM

## 2022-10-11 DIAGNOSIS — E11.8 TYPE 2 DIABETES MELLITUS WITH COMPLICATION, WITHOUT LONG-TERM CURRENT USE OF INSULIN (HCC): ICD-10-CM

## 2022-10-11 RX ORDER — METFORMIN HYDROCHLORIDE 500 MG/1
TABLET ORAL
Qty: 180 TABLET | Refills: 2 | Status: SHIPPED | OUTPATIENT
Start: 2022-10-11

## 2022-10-11 RX ORDER — VALSARTAN AND HYDROCHLOROTHIAZIDE 160; 12.5 MG/1; MG/1
TABLET, FILM COATED ORAL
Qty: 90 TABLET | Refills: 1 | Status: SHIPPED | OUTPATIENT
Start: 2022-10-11

## 2022-12-13 RX ORDER — LANCETS 33 GAUGE
EACH MISCELLANEOUS
Qty: 900 LANCET | Refills: 1 | Status: SHIPPED | OUTPATIENT
Start: 2022-12-13

## 2023-01-19 ENCOUNTER — APPOINTMENT (OUTPATIENT)
Dept: CT IMAGING | Age: 63
DRG: 166 | End: 2023-01-19
Attending: EMERGENCY MEDICINE
Payer: COMMERCIAL

## 2023-01-19 ENCOUNTER — HOSPITAL ENCOUNTER (INPATIENT)
Age: 63
LOS: 7 days | Discharge: HOME | DRG: 166 | End: 2023-01-26
Attending: EMERGENCY MEDICINE | Admitting: INTERNAL MEDICINE
Payer: COMMERCIAL

## 2023-01-19 ENCOUNTER — APPOINTMENT (OUTPATIENT)
Dept: GENERAL RADIOLOGY | Age: 63
DRG: 166 | End: 2023-01-19
Attending: EMERGENCY MEDICINE
Payer: COMMERCIAL

## 2023-01-19 DIAGNOSIS — E87.6 HYPOKALEMIA: ICD-10-CM

## 2023-01-19 DIAGNOSIS — A41.9 SEPSIS, DUE TO UNSPECIFIED ORGANISM, UNSPECIFIED WHETHER ACUTE ORGAN DYSFUNCTION PRESENT (HCC): ICD-10-CM

## 2023-01-19 DIAGNOSIS — J18.9 PNEUMONIA OF RIGHT LOWER LOBE DUE TO INFECTIOUS ORGANISM: ICD-10-CM

## 2023-01-19 DIAGNOSIS — R73.9 HYPERGLYCEMIA: ICD-10-CM

## 2023-01-19 DIAGNOSIS — R00.0 TACHYCARDIA: Primary | ICD-10-CM

## 2023-01-19 DIAGNOSIS — J90 PLEURAL EFFUSION: ICD-10-CM

## 2023-01-19 LAB
ALBUMIN SERPL-MCNC: 3 G/DL (ref 3.5–5)
ALBUMIN/GLOB SERPL: 0.6 (ref 1.1–2.2)
ALP SERPL-CCNC: 94 U/L (ref 45–117)
ALT SERPL-CCNC: 28 U/L (ref 12–78)
ANION GAP SERPL CALC-SCNC: 11 MMOL/L (ref 5–15)
AST SERPL-CCNC: 19 U/L (ref 15–37)
BASOPHILS # BLD: 0.1 K/UL (ref 0–0.1)
BASOPHILS NFR BLD: 1 % (ref 0–1)
BILIRUB SERPL-MCNC: 1 MG/DL (ref 0.2–1)
BNP SERPL-MCNC: 26 PG/ML
BUN SERPL-MCNC: 13 MG/DL (ref 6–20)
BUN/CREAT SERPL: 14 (ref 12–20)
CALCIUM SERPL-MCNC: 9.3 MG/DL (ref 8.5–10.1)
CHLORIDE SERPL-SCNC: 88 MMOL/L (ref 97–108)
CO2 SERPL-SCNC: 29 MMOL/L (ref 21–32)
COMMENT, HOLDF: NORMAL
COVID-19 RAPID TEST, COVR: NOT DETECTED
CREAT SERPL-MCNC: 0.9 MG/DL (ref 0.55–1.02)
DIFFERENTIAL METHOD BLD: ABNORMAL
EOSINOPHIL # BLD: 0.1 K/UL (ref 0–0.4)
EOSINOPHIL NFR BLD: 1 % (ref 0–7)
ERYTHROCYTE [DISTWIDTH] IN BLOOD BY AUTOMATED COUNT: 13 % (ref 11.5–14.5)
ETHANOL SERPL-MCNC: <10 MG/DL
FLUAV AG NPH QL IA: NEGATIVE
FLUBV AG NOSE QL IA: NEGATIVE
GLOBULIN SER CALC-MCNC: 4.8 G/DL (ref 2–4)
GLUCOSE SERPL-MCNC: 395 MG/DL (ref 65–100)
HCT VFR BLD AUTO: 42.4 % (ref 35–47)
HGB BLD-MCNC: 14.5 G/DL (ref 11.5–16)
IMM GRANULOCYTES # BLD AUTO: 0.1 K/UL (ref 0–0.04)
IMM GRANULOCYTES NFR BLD AUTO: 1 % (ref 0–0.5)
LACTATE BLD-SCNC: 1.11 MMOL/L (ref 0.4–2)
LACTATE SERPL-SCNC: 2.1 MMOL/L (ref 0.4–2)
LYMPHOCYTES # BLD: 1.3 K/UL (ref 0.8–3.5)
LYMPHOCYTES NFR BLD: 10 % (ref 12–49)
MAGNESIUM SERPL-MCNC: 1.7 MG/DL (ref 1.6–2.4)
MCH RBC QN AUTO: 28.4 PG (ref 26–34)
MCHC RBC AUTO-ENTMCNC: 34.2 G/DL (ref 30–36.5)
MCV RBC AUTO: 83 FL (ref 80–99)
MONOCYTES # BLD: 0.9 K/UL (ref 0–1)
MONOCYTES NFR BLD: 7 % (ref 5–13)
NEUTS SEG # BLD: 10.2 K/UL (ref 1.8–8)
NEUTS SEG NFR BLD: 80 % (ref 32–75)
NRBC # BLD: 0 K/UL (ref 0–0.01)
NRBC BLD-RTO: 0 PER 100 WBC
PLATELET # BLD AUTO: 296 K/UL (ref 150–400)
PMV BLD AUTO: 11 FL (ref 8.9–12.9)
POTASSIUM SERPL-SCNC: 2.9 MMOL/L (ref 3.5–5.1)
PROCALCITONIN SERPL-MCNC: 0.69 NG/ML
PROT SERPL-MCNC: 7.8 G/DL (ref 6.4–8.2)
RBC # BLD AUTO: 5.11 M/UL (ref 3.8–5.2)
SAMPLES BEING HELD,HOLD: NORMAL
SODIUM SERPL-SCNC: 128 MMOL/L (ref 136–145)
SOURCE, COVRS: NORMAL
TROPONIN-HIGH SENSITIVITY: 6 NG/L (ref 0–51)
WBC # BLD AUTO: 12.6 K/UL (ref 3.6–11)

## 2023-01-19 PROCEDURE — 74011000250 HC RX REV CODE- 250: Performed by: INTERNAL MEDICINE

## 2023-01-19 PROCEDURE — 0W9940Z DRAINAGE OF RIGHT PLEURAL CAVITY WITH DRAINAGE DEVICE, PERCUTANEOUS ENDOSCOPIC APPROACH: ICD-10-PCS

## 2023-01-19 PROCEDURE — 85025 COMPLETE CBC W/AUTO DIFF WBC: CPT

## 2023-01-19 PROCEDURE — 3E03317 INTRODUCTION OF OTHER THROMBOLYTIC INTO PERIPHERAL VEIN, PERCUTANEOUS APPROACH: ICD-10-PCS

## 2023-01-19 PROCEDURE — 0W9940Z DRAINAGE OF RIGHT PLEURAL CAVITY WITH DRAINAGE DEVICE, PERCUTANEOUS ENDOSCOPIC APPROACH: ICD-10-PCS | Performed by: INTERNAL MEDICINE

## 2023-01-19 PROCEDURE — 83605 ASSAY OF LACTIC ACID: CPT

## 2023-01-19 PROCEDURE — 82077 ASSAY SPEC XCP UR&BREATH IA: CPT

## 2023-01-19 PROCEDURE — 83735 ASSAY OF MAGNESIUM: CPT

## 2023-01-19 PROCEDURE — 87635 SARS-COV-2 COVID-19 AMP PRB: CPT

## 2023-01-19 PROCEDURE — 96375 TX/PRO/DX INJ NEW DRUG ADDON: CPT

## 2023-01-19 PROCEDURE — 84145 PROCALCITONIN (PCT): CPT

## 2023-01-19 PROCEDURE — 80053 COMPREHEN METABOLIC PANEL: CPT

## 2023-01-19 PROCEDURE — 74011250637 HC RX REV CODE- 250/637: Performed by: EMERGENCY MEDICINE

## 2023-01-19 PROCEDURE — 99285 EMERGENCY DEPT VISIT HI MDM: CPT

## 2023-01-19 PROCEDURE — 93005 ELECTROCARDIOGRAM TRACING: CPT

## 2023-01-19 PROCEDURE — 3E03317 INTRODUCTION OF OTHER THROMBOLYTIC INTO PERIPHERAL VEIN, PERCUTANEOUS APPROACH: ICD-10-PCS | Performed by: INTERNAL MEDICINE

## 2023-01-19 PROCEDURE — 71046 X-RAY EXAM CHEST 2 VIEWS: CPT

## 2023-01-19 PROCEDURE — 74011250636 HC RX REV CODE- 250/636: Performed by: EMERGENCY MEDICINE

## 2023-01-19 PROCEDURE — 65270000029 HC RM PRIVATE

## 2023-01-19 PROCEDURE — 71275 CT ANGIOGRAPHY CHEST: CPT

## 2023-01-19 PROCEDURE — 84484 ASSAY OF TROPONIN QUANT: CPT

## 2023-01-19 PROCEDURE — 87804 INFLUENZA ASSAY W/OPTIC: CPT

## 2023-01-19 PROCEDURE — 74011000636 HC RX REV CODE- 636: Performed by: EMERGENCY MEDICINE

## 2023-01-19 PROCEDURE — 36415 COLL VENOUS BLD VENIPUNCTURE: CPT

## 2023-01-19 PROCEDURE — 74011000258 HC RX REV CODE- 258: Performed by: EMERGENCY MEDICINE

## 2023-01-19 PROCEDURE — 83880 ASSAY OF NATRIURETIC PEPTIDE: CPT

## 2023-01-19 PROCEDURE — 96365 THER/PROPH/DIAG IV INF INIT: CPT

## 2023-01-19 PROCEDURE — 87040 BLOOD CULTURE FOR BACTERIA: CPT

## 2023-01-19 RX ORDER — ONDANSETRON 4 MG/1
4 TABLET, ORALLY DISINTEGRATING ORAL
Status: DISCONTINUED | OUTPATIENT
Start: 2023-01-19 | End: 2023-01-26 | Stop reason: HOSPADM

## 2023-01-19 RX ORDER — ACETAMINOPHEN 325 MG/1
650 TABLET ORAL
Status: DISCONTINUED | OUTPATIENT
Start: 2023-01-19 | End: 2023-01-26 | Stop reason: HOSPADM

## 2023-01-19 RX ORDER — POTASSIUM CHLORIDE 750 MG/1
40 TABLET, FILM COATED, EXTENDED RELEASE ORAL
Status: COMPLETED | OUTPATIENT
Start: 2023-01-19 | End: 2023-01-19

## 2023-01-19 RX ORDER — BENZONATATE 100 MG/1
100 CAPSULE ORAL 3 TIMES DAILY
Status: DISCONTINUED | OUTPATIENT
Start: 2023-01-19 | End: 2023-01-26 | Stop reason: HOSPADM

## 2023-01-19 RX ORDER — DEXTROSE MONOHYDRATE 100 MG/ML
0-250 INJECTION, SOLUTION INTRAVENOUS AS NEEDED
Status: DISCONTINUED | OUTPATIENT
Start: 2023-01-19 | End: 2023-01-26 | Stop reason: HOSPADM

## 2023-01-19 RX ORDER — IBUPROFEN 200 MG
4 TABLET ORAL AS NEEDED
Status: DISCONTINUED | OUTPATIENT
Start: 2023-01-19 | End: 2023-01-26 | Stop reason: HOSPADM

## 2023-01-19 RX ORDER — GUAIFENESIN 600 MG/1
1200 TABLET, EXTENDED RELEASE ORAL 2 TIMES DAILY
Status: DISCONTINUED | OUTPATIENT
Start: 2023-01-19 | End: 2023-01-26 | Stop reason: HOSPADM

## 2023-01-19 RX ORDER — ACETAMINOPHEN 650 MG/1
650 SUPPOSITORY RECTAL
Status: DISCONTINUED | OUTPATIENT
Start: 2023-01-19 | End: 2023-01-26 | Stop reason: HOSPADM

## 2023-01-19 RX ORDER — ENOXAPARIN SODIUM 100 MG/ML
40 INJECTION SUBCUTANEOUS DAILY
Status: DISCONTINUED | OUTPATIENT
Start: 2023-01-20 | End: 2023-01-26 | Stop reason: HOSPADM

## 2023-01-19 RX ORDER — ONDANSETRON 2 MG/ML
4 INJECTION INTRAMUSCULAR; INTRAVENOUS
Status: DISCONTINUED | OUTPATIENT
Start: 2023-01-19 | End: 2023-01-26 | Stop reason: HOSPADM

## 2023-01-19 RX ORDER — INSULIN LISPRO 100 [IU]/ML
INJECTION, SOLUTION INTRAVENOUS; SUBCUTANEOUS
Status: DISCONTINUED | OUTPATIENT
Start: 2023-01-19 | End: 2023-01-26 | Stop reason: HOSPADM

## 2023-01-19 RX ORDER — POLYETHYLENE GLYCOL 3350 17 G/17G
17 POWDER, FOR SOLUTION ORAL DAILY PRN
Status: DISCONTINUED | OUTPATIENT
Start: 2023-01-19 | End: 2023-01-26 | Stop reason: HOSPADM

## 2023-01-19 RX ORDER — SODIUM CHLORIDE, SODIUM LACTATE, POTASSIUM CHLORIDE, CALCIUM CHLORIDE 600; 310; 30; 20 MG/100ML; MG/100ML; MG/100ML; MG/100ML
75 INJECTION, SOLUTION INTRAVENOUS CONTINUOUS
Status: DISCONTINUED | OUTPATIENT
Start: 2023-01-19 | End: 2023-01-25

## 2023-01-19 RX ORDER — SODIUM CHLORIDE 0.9 % (FLUSH) 0.9 %
5-40 SYRINGE (ML) INJECTION AS NEEDED
Status: DISCONTINUED | OUTPATIENT
Start: 2023-01-19 | End: 2023-01-26 | Stop reason: HOSPADM

## 2023-01-19 RX ORDER — SODIUM CHLORIDE 0.9 % (FLUSH) 0.9 %
5-40 SYRINGE (ML) INJECTION EVERY 8 HOURS
Status: DISCONTINUED | OUTPATIENT
Start: 2023-01-19 | End: 2023-01-26 | Stop reason: HOSPADM

## 2023-01-19 RX ORDER — POTASSIUM CHLORIDE 750 MG/1
40 TABLET, FILM COATED, EXTENDED RELEASE ORAL
Status: DISPENSED | OUTPATIENT
Start: 2023-01-19 | End: 2023-01-20

## 2023-01-19 RX ORDER — DEXAMETHASONE SODIUM PHOSPHATE 10 MG/ML
6 INJECTION INTRAMUSCULAR; INTRAVENOUS
Status: COMPLETED | OUTPATIENT
Start: 2023-01-19 | End: 2023-01-19

## 2023-01-19 RX ADMIN — IOPAMIDOL 100 ML: 755 INJECTION, SOLUTION INTRAVENOUS at 19:37

## 2023-01-19 RX ADMIN — POTASSIUM CHLORIDE 40 MEQ: 750 TABLET, FILM COATED, EXTENDED RELEASE ORAL at 20:14

## 2023-01-19 RX ADMIN — Medication 10 ML: at 22:12

## 2023-01-19 RX ADMIN — SODIUM CHLORIDE 1000 ML: 9 INJECTION, SOLUTION INTRAVENOUS at 18:01

## 2023-01-19 RX ADMIN — CEFEPIME 2 G: 2 INJECTION, POWDER, FOR SOLUTION INTRAVENOUS at 20:14

## 2023-01-19 RX ADMIN — DEXAMETHASONE SODIUM PHOSPHATE 6 MG: 10 INJECTION, SOLUTION INTRAMUSCULAR; INTRAVENOUS at 18:01

## 2023-01-19 RX ADMIN — VANCOMYCIN HYDROCHLORIDE 2250 MG: 10 INJECTION, POWDER, LYOPHILIZED, FOR SOLUTION INTRAVENOUS at 21:57

## 2023-01-19 NOTE — ED TRIAGE NOTES
Patient c/o shortness of breath and tightness on right side of ribs and shoulder x 1 week worsening today.      Patient placed on 2 L NC in triage     PMH HTN

## 2023-01-20 ENCOUNTER — APPOINTMENT (OUTPATIENT)
Dept: ULTRASOUND IMAGING | Age: 63
DRG: 166 | End: 2023-01-20
Attending: INTERNAL MEDICINE
Payer: COMMERCIAL

## 2023-01-20 ENCOUNTER — APPOINTMENT (OUTPATIENT)
Dept: GENERAL RADIOLOGY | Age: 63
DRG: 166 | End: 2023-01-20
Attending: STUDENT IN AN ORGANIZED HEALTH CARE EDUCATION/TRAINING PROGRAM
Payer: COMMERCIAL

## 2023-01-20 LAB
ANION GAP SERPL CALC-SCNC: 8 MMOL/L (ref 5–15)
APPEARANCE FLD: ABNORMAL
ATRIAL RATE: 129 BPM
ATRIAL RATE: 131 BPM
BODY FLD TYPE: NORMAL
BUN SERPL-MCNC: 12 MG/DL (ref 6–20)
BUN/CREAT SERPL: 17 (ref 12–20)
CALCIUM SERPL-MCNC: 8.3 MG/DL (ref 8.5–10.1)
CALCULATED P AXIS, ECG09: 40 DEGREES
CALCULATED P AXIS, ECG09: 48 DEGREES
CALCULATED R AXIS, ECG10: -16 DEGREES
CALCULATED R AXIS, ECG10: -27 DEGREES
CALCULATED T AXIS, ECG11: -12 DEGREES
CALCULATED T AXIS, ECG11: 1 DEGREES
CHLORIDE SERPL-SCNC: 100 MMOL/L (ref 97–108)
CO2 SERPL-SCNC: 27 MMOL/L (ref 21–32)
COLOR FLD: ABNORMAL
CREAT SERPL-MCNC: 0.71 MG/DL (ref 0.55–1.02)
DIAGNOSIS, 93000: NORMAL
DIAGNOSIS, 93000: NORMAL
ERYTHROCYTE [DISTWIDTH] IN BLOOD BY AUTOMATED COUNT: 13.5 % (ref 11.5–14.5)
GLUCOSE BLD STRIP.AUTO-MCNC: 312 MG/DL (ref 65–117)
GLUCOSE BLD STRIP.AUTO-MCNC: 331 MG/DL (ref 65–117)
GLUCOSE BLD STRIP.AUTO-MCNC: 343 MG/DL (ref 65–117)
GLUCOSE BLD STRIP.AUTO-MCNC: 383 MG/DL (ref 65–117)
GLUCOSE BLD STRIP.AUTO-MCNC: 404 MG/DL (ref 65–117)
GLUCOSE FLD-MCNC: 389 MG/DL
GLUCOSE SERPL-MCNC: 414 MG/DL (ref 65–100)
HCT VFR BLD AUTO: 34.1 % (ref 35–47)
HGB BLD-MCNC: 11.3 G/DL (ref 11.5–16)
LDH FLD L TO P-CCNC: 1204 U/L
LYMPHOCYTES NFR FLD: 16 %
MCH RBC QN AUTO: 28.1 PG (ref 26–34)
MCHC RBC AUTO-ENTMCNC: 33.1 G/DL (ref 30–36.5)
MCV RBC AUTO: 84.8 FL (ref 80–99)
MONOS+MACROS NFR FLD: 14 %
NEUTROPHILS NFR FLD: 70 %
NRBC # BLD: 0 K/UL (ref 0–0.01)
NRBC BLD-RTO: 0 PER 100 WBC
NUC CELL # FLD: ABNORMAL /CU MM
P-R INTERVAL, ECG05: 126 MS
P-R INTERVAL, ECG05: 130 MS
PH FLD: 6
PLATELET # BLD AUTO: 221 K/UL (ref 150–400)
PMV BLD AUTO: 11.3 FL (ref 8.9–12.9)
POTASSIUM SERPL-SCNC: 4.2 MMOL/L (ref 3.5–5.1)
PROT FLD-MCNC: 4.4 G/DL
Q-T INTERVAL, ECG07: 338 MS
Q-T INTERVAL, ECG07: 360 MS
QRS DURATION, ECG06: 128 MS
QRS DURATION, ECG06: 128 MS
QTC CALCULATION (BEZET), ECG08: 495 MS
QTC CALCULATION (BEZET), ECG08: 531 MS
RBC # BLD AUTO: 4.02 M/UL (ref 3.8–5.2)
RBC # FLD: >100 /CU MM
SERVICE CMNT-IMP: ABNORMAL
SODIUM SERPL-SCNC: 135 MMOL/L (ref 136–145)
SPECIMEN SOURCE FLD: ABNORMAL
SPECIMEN SOURCE FLD: NORMAL
VANCOMYCIN SERPL-MCNC: 7.7 UG/ML
VENTRICULAR RATE, ECG03: 129 BPM
VENTRICULAR RATE, ECG03: 131 BPM
WBC # BLD AUTO: 11.5 K/UL (ref 3.6–11)

## 2023-01-20 PROCEDURE — C1819 TISSUE LOCALIZATION-EXCISION: HCPCS

## 2023-01-20 PROCEDURE — 80202 ASSAY OF VANCOMYCIN: CPT

## 2023-01-20 PROCEDURE — 88305 TISSUE EXAM BY PATHOLOGIST: CPT

## 2023-01-20 PROCEDURE — 82565 ASSAY OF CREATININE: CPT

## 2023-01-20 PROCEDURE — 74011250636 HC RX REV CODE- 250/636: Performed by: INTERNAL MEDICINE

## 2023-01-20 PROCEDURE — 74011250637 HC RX REV CODE- 250/637: Performed by: INTERNAL MEDICINE

## 2023-01-20 PROCEDURE — 74011636637 HC RX REV CODE- 636/637: Performed by: INTERNAL MEDICINE

## 2023-01-20 PROCEDURE — 80048 BASIC METABOLIC PNL TOTAL CA: CPT

## 2023-01-20 PROCEDURE — 32557 INSERT CATH PLEURA W/ IMAGE: CPT

## 2023-01-20 PROCEDURE — 87077 CULTURE AEROBIC IDENTIFY: CPT

## 2023-01-20 PROCEDURE — 74011000258 HC RX REV CODE- 258: Performed by: INTERNAL MEDICINE

## 2023-01-20 PROCEDURE — 82945 GLUCOSE OTHER FLUID: CPT

## 2023-01-20 PROCEDURE — 74011250636 HC RX REV CODE- 250/636: Performed by: STUDENT IN AN ORGANIZED HEALTH CARE EDUCATION/TRAINING PROGRAM

## 2023-01-20 PROCEDURE — 74011000250 HC RX REV CODE- 250: Performed by: INTERNAL MEDICINE

## 2023-01-20 PROCEDURE — 89050 BODY FLUID CELL COUNT: CPT

## 2023-01-20 PROCEDURE — 85027 COMPLETE CBC AUTOMATED: CPT

## 2023-01-20 PROCEDURE — 83986 ASSAY PH BODY FLUID NOS: CPT

## 2023-01-20 PROCEDURE — 87186 SC STD MICRODIL/AGAR DIL: CPT

## 2023-01-20 PROCEDURE — 65270000029 HC RM PRIVATE

## 2023-01-20 PROCEDURE — 87102 FUNGUS ISOLATION CULTURE: CPT

## 2023-01-20 PROCEDURE — 87205 SMEAR GRAM STAIN: CPT

## 2023-01-20 PROCEDURE — 82962 GLUCOSE BLOOD TEST: CPT

## 2023-01-20 PROCEDURE — 36415 COLL VENOUS BLD VENIPUNCTURE: CPT

## 2023-01-20 PROCEDURE — 83615 LACTATE (LD) (LDH) ENZYME: CPT

## 2023-01-20 PROCEDURE — 71045 X-RAY EXAM CHEST 1 VIEW: CPT

## 2023-01-20 PROCEDURE — 84157 ASSAY OF PROTEIN OTHER: CPT

## 2023-01-20 PROCEDURE — 88112 CYTOPATH CELL ENHANCE TECH: CPT

## 2023-01-20 RX ORDER — FENTANYL CITRATE 50 UG/ML
100 INJECTION, SOLUTION INTRAMUSCULAR; INTRAVENOUS
Status: DISCONTINUED | OUTPATIENT
Start: 2023-01-20 | End: 2023-01-20

## 2023-01-20 RX ORDER — ASCORBIC ACID 500 MG
1000 TABLET ORAL DAILY
COMMUNITY

## 2023-01-20 RX ORDER — LIDOCAINE HYDROCHLORIDE 10 MG/ML
10 INJECTION, SOLUTION EPIDURAL; INFILTRATION; INTRACAUDAL; PERINEURAL
Status: COMPLETED | OUTPATIENT
Start: 2023-01-20 | End: 2023-01-20

## 2023-01-20 RX ORDER — GLUCOSAMINE SULFATE 1500 MG
1000 POWDER IN PACKET (EA) ORAL DAILY
COMMUNITY

## 2023-01-20 RX ORDER — METRONIDAZOLE 500 MG/100ML
500 INJECTION, SOLUTION INTRAVENOUS EVERY 12 HOURS
Status: DISCONTINUED | OUTPATIENT
Start: 2023-01-20 | End: 2023-01-24

## 2023-01-20 RX ORDER — INSULIN LISPRO 100 [IU]/ML
18 INJECTION, SOLUTION INTRAVENOUS; SUBCUTANEOUS ONCE
Status: COMPLETED | OUTPATIENT
Start: 2023-01-20 | End: 2023-01-20

## 2023-01-20 RX ADMIN — VALSARTAN: 80 TABLET ORAL at 08:59

## 2023-01-20 RX ADMIN — SODIUM CHLORIDE, POTASSIUM CHLORIDE, SODIUM LACTATE AND CALCIUM CHLORIDE 125 ML/HR: 600; 310; 30; 20 INJECTION, SOLUTION INTRAVENOUS at 14:27

## 2023-01-20 RX ADMIN — FENTANYL CITRATE 25 MCG: 50 INJECTION, SOLUTION INTRAMUSCULAR; INTRAVENOUS at 10:59

## 2023-01-20 RX ADMIN — CEFEPIME 2 G: 20 INJECTION, POWDER, FOR SOLUTION INTRAVENOUS at 04:31

## 2023-01-20 RX ADMIN — INSULIN LISPRO 7 UNITS: 100 INJECTION, SOLUTION INTRAVENOUS; SUBCUTANEOUS at 17:28

## 2023-01-20 RX ADMIN — GUAIFENESIN 1200 MG: 600 TABLET ORAL at 08:59

## 2023-01-20 RX ADMIN — METRONIDAZOLE 500 MG: 500 INJECTION, SOLUTION INTRAVENOUS at 09:00

## 2023-01-20 RX ADMIN — SODIUM CHLORIDE, POTASSIUM CHLORIDE, SODIUM LACTATE AND CALCIUM CHLORIDE 125 ML/HR: 600; 310; 30; 20 INJECTION, SOLUTION INTRAVENOUS at 00:48

## 2023-01-20 RX ADMIN — BENZONATATE 100 MG: 100 CAPSULE ORAL at 00:47

## 2023-01-20 RX ADMIN — BENZONATATE 100 MG: 100 CAPSULE ORAL at 16:42

## 2023-01-20 RX ADMIN — INSULIN LISPRO 2 UNITS: 100 INJECTION, SOLUTION INTRAVENOUS; SUBCUTANEOUS at 22:51

## 2023-01-20 RX ADMIN — ENOXAPARIN SODIUM 40 MG: 100 INJECTION SUBCUTANEOUS at 09:00

## 2023-01-20 RX ADMIN — VANCOMYCIN HYDROCHLORIDE 1000 MG: 1 INJECTION, POWDER, LYOPHILIZED, FOR SOLUTION INTRAVENOUS at 12:00

## 2023-01-20 RX ADMIN — INSULIN LISPRO 10 UNITS: 100 INJECTION, SOLUTION INTRAVENOUS; SUBCUTANEOUS at 00:47

## 2023-01-20 RX ADMIN — POTASSIUM CHLORIDE 40 MEQ: 750 TABLET, FILM COATED, EXTENDED RELEASE ORAL at 00:47

## 2023-01-20 RX ADMIN — Medication 10 ML: at 21:02

## 2023-01-20 RX ADMIN — GUAIFENESIN 1200 MG: 600 TABLET ORAL at 00:47

## 2023-01-20 RX ADMIN — GUAIFENESIN 1200 MG: 600 TABLET ORAL at 20:58

## 2023-01-20 RX ADMIN — INSULIN LISPRO 7 UNITS: 100 INJECTION, SOLUTION INTRAVENOUS; SUBCUTANEOUS at 12:00

## 2023-01-20 RX ADMIN — BENZONATATE 100 MG: 100 CAPSULE ORAL at 21:02

## 2023-01-20 RX ADMIN — METRONIDAZOLE 500 MG: 500 INJECTION, SOLUTION INTRAVENOUS at 20:58

## 2023-01-20 RX ADMIN — BENZONATATE 100 MG: 100 CAPSULE ORAL at 08:59

## 2023-01-20 RX ADMIN — LIDOCAINE HYDROCHLORIDE 10 ML: 10 INJECTION, SOLUTION EPIDURAL; INFILTRATION; INTRACAUDAL; PERINEURAL at 10:55

## 2023-01-20 RX ADMIN — SODIUM CHLORIDE, POTASSIUM CHLORIDE, SODIUM LACTATE AND CALCIUM CHLORIDE 1000 ML: 600; 310; 30; 20 INJECTION, SOLUTION INTRAVENOUS at 00:47

## 2023-01-20 RX ADMIN — INSULIN LISPRO 18 UNITS: 100 INJECTION, SOLUTION INTRAVENOUS; SUBCUTANEOUS at 08:58

## 2023-01-20 RX ADMIN — CEFEPIME 2 G: 20 INJECTION, POWDER, FOR SOLUTION INTRAVENOUS at 20:58

## 2023-01-20 RX ADMIN — CEFEPIME 2 G: 20 INJECTION, POWDER, FOR SOLUTION INTRAVENOUS at 12:00

## 2023-01-20 NOTE — PROGRESS NOTES
[address] [address] RX Pharmacy Progress Note: Antimicrobial Stewardship    Consult for antibiotic dosing of vancomycin by Dr. Hutton Model  Indication: CAP PNA  Day of Therapy: 1 of 5    Plan:  Vancomycin therapy:   Start with loading dose of vancomycin 2250mg then 1gm ivpb q12h   Dose calculated to approximate a   Target AUC/ISABELLA of 400-600   Plan:  Check level in 24hrs   Pharmacy to follow daily and will make changes to dose and/or frequency based on clinical status. Date Dose & Interval Measured (mcg/mL) Extrapolated (mcg/mL)   ? 1/19/23 ?2250mg ? ?   ?1/20/23 ? 1gm q12h ? ?   ? ? ? ? Other Antimicrobial  (not dosed by pharmacist)   Cefepime 2gm q8h   Cultures     Paired blood cx's ordered   Serum Creatinine     Lab Results   Component Value Date/Time    Creatinine 0.90 01/19/2023 05:40 PM       Creatinine Clearance Estimated Creatinine Clearance: 67 mL/min (based on SCr of 0.9 mg/dL). Procalcitonin    Lab Results   Component Value Date/Time    Procalcitonin 0.69 01/19/2023 05:40 PM        Temp   100.4 °F (38 °C)    WBC   Lab Results   Component Value Date/Time    WBC 12.6 (H) 01/19/2023 05:40 PM       For Antifungals, Metronidazole and Nafcillin: Lab Results   Component Value Date/Time    ALT (SGPT) 28 01/19/2023 05:40 PM    AST (SGOT) 19 01/19/2023 05:40 PM    Alk.  phosphatase 94 01/19/2023 05:40 PM    Bilirubin, total 1.0 01/19/2023 05:40 PM         Pharmacist: [address] [place] [address] Yessi Mccrary

## 2023-01-20 NOTE — PROGRESS NOTES
[address] [address] RX Pharmacy Progress Note: Antimicrobial Stewardship 1/20/2023    Consult for antibiotic dosing of vancomycin by Dr. Chloe Matt  Indication: CAP; Empyema  Day of Therapy: 2    Plan:  Vancomycin therapy:   Start with loading dose of vancomycin 2250mg then 1gm ivpb q12h   Dose calculated to approximate a   Target AUC/ISABELLA of 400-600   Plan: Continue current regimen for anticipated therapeutic AUC per bayesian kinetics model. Will draw an early level this evening to ensure efficacy in s/o loculated PL eff, empyema, and chest tube placement. Pharmacy to follow daily and will make changes to dose and/or frequency based on clinical status. Date Dose & Interval Measured (mcg/mL) Extrapolated (mcg/mL)   ? 1/19/23 ?2250mg ? ?   ?1/20/23 ? 1gm q12h ? ?   ? ? ? ? Other Antimicrobial  (not dosed by pharmacist)   Cefepime 2gm q8h   Cultures     1/20 PL fluid and fungus Cx- Pending  1/19 Blood (Paired)- NGTD, Prelim  1/19 COVID/Flu rapid - Negative   Serum Creatinine     Lab Results   Component Value Date/Time    Creatinine 0.71 01/20/2023 04:03 AM       Creatinine Clearance Estimated Creatinine Clearance: 85 mL/min (based on SCr of 0.71 mg/dL). Procalcitonin    Lab Results   Component Value Date/Time    Procalcitonin 0.69 01/19/2023 05:40 PM        Temp   97.8 °F (36.6 °C)    WBC   Lab Results   Component Value Date/Time    WBC 11.5 (H) 01/20/2023 04:03 AM       For Antifungals, Metronidazole and Nafcillin: Lab Results   Component Value Date/Time    ALT (SGPT) 28 01/19/2023 05:40 PM    AST (SGOT) 19 01/19/2023 05:40 PM    Alk.  phosphatase 94 01/19/2023 05:40 PM    Bilirubin, total 1.0 01/19/2023 05:40 PM         Thanks,  Pharmacist: Celso Keene, RANDALLD

## 2023-01-20 NOTE — ED NOTES
Bedside and Verbal shift change report given to MARIA ELENA Jensen (oncoming nurse) by Miguel Harry RN (offgoing nurse). Report included the following information SBAR, ED Summary, MAR, and Recent Results.

## 2023-01-20 NOTE — ED NOTES
Pt taken off oxygen and sat's went to 88% resting in stretcher, MD notified, pt placed back on oxygen, Report given to Juan Manuel Calderon

## 2023-01-20 NOTE — H&P
Hospitalist Admission Note    NAME:  Yarelis Espinoza   :  1960   MRN:  [id number]     Date/Time:  2023 9:57 PM    Patient PCP: Wilber Suárez MD  ________________________________________________________________________    Given the patient's current clinical presentation, I have a high level of concern for decompensation if discharged from the emergency department. Complex decision making was performed, which includes reviewing the patient's available past medical records, laboratory results, and x-ray films. My assessment of this patient's clinical condition and my plan of care is as follows. Assessment / Plan:    Loculated R pleural effusion:    The patient comes in w/ recent viral illness but now has right shoulder blade pain    VS - Normal oxy sat on 2L NC  WBC 12.6, Hg 14.5  K 2.9  BUN 13, Cr 0.9  Chest CTA - small right loculated pleural effusion - unable to visualize CT due to computer/technical issues    Admit and cont to monitor  Cont w/ IV Vancomycin/Zosyn and re-evaluate symptoms. If no improvement she may need drainage  Consult Pulmonary for further recommendations    2. Hypokalemia:    Replete K    3. DM 2:    ISS w/ BG checks    4. HTN:    Cont w/ Diovan-HCTZ 160/12.5mg daily    Body mass index is 35.67 kg/m².:  30.0 - 39.9:  Obese      I have personally reviewed the radiographs, laboratory data in Epic and decisions and statements above are based partially on this personal interpretation. Code Status: Full Code  Surrogate Decision Maker  Abram Pace ()  [phone]    Prophylaxis:  Lovenox SQ     Subjective:   CHIEF COMPLAINT: Coughing    HISTORY OF PRESENT ILLNESS:       The patient is a 57 y/o C F w/ PMH DM2 who comes in w/ persistent non-productive cough which has been on-going for the past one week. She was recently diagnosed w/ viral lower respiratory infection and her symptoms did improve until a few days ago.   She notes of right sided shoulder blade pain that radiates to her right lateral side. It is significantly worse w/ deep inspiration. She has no chest pain, wheezing, weight gain or lower extremity edema. On ROS she notes of fever, severe fatigue w/ exertion. She has not had any sick contacts but notes her daughter has the flu but has been isolated. She has no recent travel history. She did not get her influenza vaccine but has obtained her COVID 19 but no the booster. Past Medical History:   Diagnosis Date    Hypertension     Type 2 diabetes mellitus ([address]       Past Surgical History:   Procedure Laterality Date    HX CHOLECYSTECTOMY      HX ERCP      HX LAP CHOLECYSTECTOMY  08/11/2019     Social History     Tobacco Use    Smoking status: Never    Smokeless tobacco: Never   Substance Use Topics    Alcohol use: Yes     Alcohol/week: 1.0 standard drink     Types: 1 Glasses of wine per week     Comment: 1-3 drinks per month      Family History   Problem Relation Age of Onset    Diabetes Mother         type 2    Hypertension Mother     Diabetes Sister     Congenital heart defect Maternal Grandmother         No Known Allergies     Prior to Admission medications    Medication Sig Start Date End Date Taking? Authorizing Provider   OneTouch Delica Plus Lancet 33 gauge misc use 1 LANCET to TEST BLOOD SUGAR DAILY 12/13/22  Yes Gris Roberts MD   metFORMIN (GLUCOPHAGE) 500 mg tablet take 1 tablet by mouth twice a day with meals 10/11/22  Yes Gris Roberts MD   valsartan-hydroCHLOROthiazide (DIOVAN-HCT) 160-12.5 mg per tablet take 1 tablet by mouth once daily 10/11/22  Yes Gris Roberts MD   OneTouch Ultra Test strip use 1 TEST STRIP to TEST BLOOD SUGAR once daily 6/14/22  Yes Gris Roberts MD   lancets misc Check fasting blood sugars daily.  8/22/19  Yes Gris Roberts MD   Gentry Pottier monitoring kit use as directed 8/22/19  Yes Gris Roberts MD       REVIEW OF SYSTEMS:  See HPI for details  General: +fever, chills, sweats, +generalized weakness, weight loss  Eyes: negative for blurred vision, eye pain, loss of vision, diplopia  Ear Nose and Throat: negative for rhinorrhea, pharyngitis, otalgia, tinnitus, speech or swallowing difficulties  Respiratory:  negative for pleuritic pain, +cough w/o sputum production, wheezing, SOB, CRAWFORD  Cardiology:  negative for chest pain, palpitations, orthopnea, PND, edema, syncope   Gastrointestinal: negative for abdominal pain, N/V, dysphagia, change in bowel habits, bleeding  Genitourinary: negative for frequency, urgency, dysuria, hematuria, incontinence  Muskuloskeletal : negative for arthralgia, myalgia  Hematology: negative for easy bruising, bleeding, lymphadenopathy  Dermatological: negative for rash, ulceration, mole change, new lesion  Endocrine: negative for hot flashes or polydipsia  Neurological: negative for headache, dizziness, confusion, focal weakness, paresthesia, memory loss, gait disturbance  Psychological: negative for anxiety, depression, agitation      Objective:   VITALS:    Visit Vitals  /82   Pulse (!) 125   Temp 100.4 °F (38 °C)   Resp 25   Ht 5' 2\" (1.575 m)   Wt 88.5 kg (195 lb)   SpO2 95%   BMI 35.67 kg/m²     PHYSICAL EXAM:    Physical Exam:    Gen: Well-developed, well-nourished, in no acute distress  HEENT:  Pink conjunctivae, PERRL, hearing intact to voice, moist mucous membranes  Neck: Supple, without masses, thyroid non-tender  Resp: No accessory muscle use, clear breath sounds without wheezes rales or rhonchi  Card: No murmurs, normal S1, S2 without thrills, bruits or peripheral edema  Abd:  Soft, non-tender, non-distended, normoactive bowel sounds are present, no palpable organomegaly and no detectable hernias  Lymph:  No cervical or inguinal adenopathy  Musc: No cyanosis or clubbing  Skin: No rashes or ulcers, skin turgor is good  Neuro:  Cranial nerves are grossly intact, no focal motor weakness, follows commands appropriately  Psych:  Good insight, oriented to person, place and time, alert  _______________________________________________________________________  Care Plan discussed with:  Pt's condition, Imaging findings, Lab findings, Assessment, and Care Plan discussed with: Patient  _______________________________________________________________________    Probable disposition:  Home  ________________________________________________________________________    Comments   >50% of visit spent in counseling and coordination of care  Chart reviewed  Discussion with patient and/or family and questions answered     ________________________________________________________________________  Signed: Giovanni Collado MD        Procedures: see electronic medical records for all procedures/Xrays and details which were not copied into this note but were reviewed prior to creation of Plan. LAB DATA REVIEWED:    Recent Results (from the past 24 hour(s))   SAMPLES BEING HELD    Collection Time: 01/19/23  5:39 PM   Result Value Ref Range    SAMPLES BEING HELD 1SST,1BLUE     COMMENT        Add-on orders for these samples will be processed based on acceptable specimen integrity and analyte stability, which may vary by analyte. LACTIC ACID    Collection Time: 01/19/23  5:39 PM   Result Value Ref Range    Lactic acid 2.1 (HH) 0.4 - 2.0 MMOL/L   CBC WITH AUTOMATED DIFF    Collection Time: 01/19/23  5:40 PM   Result Value Ref Range    WBC 12.6 (H) 3.6 - 11.0 K/uL    RBC 5.11 3.80 - 5.20 M/uL    HGB 14.5 11.5 - 16.0 g/dL    HCT 42.4 35.0 - 47.0 %    MCV 83.0 80.0 - 99.0 FL    MCH 28.4 26.0 - 34.0 PG    MCHC 34.2 30.0 - 36.5 g/dL    RDW 13.0 11.5 - 14.5 %    PLATELET 723 136 - 775 K/uL    MPV 11.0 8.9 - 12.9 FL    NRBC 0.0 0  WBC    ABSOLUTE NRBC 0.00 0.00 - 0.01 K/uL    NEUTROPHILS 80 (H) 32 - 75 %    LYMPHOCYTES 10 (L) 12 - 49 %    MONOCYTES 7 5 - 13 %    EOSINOPHILS 1 0 - 7 %    BASOPHILS 1 0 - 1 %    IMMATURE GRANULOCYTES 1 (H) 0.0 - 0.5 %    ABS.  NEUTROPHILS 10.2 (H) 1.8 - 8.0 K/UL    ABS. LYMPHOCYTES 1.3 0.8 - 3.5 K/UL    ABS. MONOCYTES 0.9 0.0 - 1.0 K/UL    ABS. EOSINOPHILS 0.1 0.0 - 0.4 K/UL    ABS. BASOPHILS 0.1 0.0 - 0.1 K/UL    ABS. IMM. GRANS. 0.1 (H) 0.00 - 0.04 K/UL    DF AUTOMATED     METABOLIC PANEL, COMPREHENSIVE    Collection Time: 01/19/23  5:40 PM   Result Value Ref Range    Sodium 128 (L) 136 - 145 mmol/L    Potassium 2.9 (L) 3.5 - 5.1 mmol/L    Chloride 88 (L) 97 - 108 mmol/L    CO2 29 21 - 32 mmol/L    Anion gap 11 5 - 15 mmol/L    Glucose 395 (H) 65 - 100 mg/dL    BUN 13 6 - 20 MG/DL    Creatinine 0.90 0.55 - 1.02 MG/DL    BUN/Creatinine ratio 14 12 - 20      eGFR >60 >60 ml/min/1.73m2    Calcium 9.3 8.5 - 10.1 MG/DL    Bilirubin, total 1.0 0.2 - 1.0 MG/DL    ALT (SGPT) 28 12 - 78 U/L    AST (SGOT) 19 15 - 37 U/L    Alk.  phosphatase 94 45 - 117 U/L    Protein, total 7.8 6.4 - 8.2 g/dL    Albumin 3.0 (L) 3.5 - 5.0 g/dL    Globulin 4.8 (H) 2.0 - 4.0 g/dL    A-G Ratio 0.6 (L) 1.1 - 2.2     TROPONIN-HIGH SENSITIVITY    Collection Time: 01/19/23  5:40 PM   Result Value Ref Range    Troponin-High Sensitivity 6 0 - 51 ng/L   NT-PRO BNP    Collection Time: 01/19/23  5:40 PM   Result Value Ref Range    NT pro-BNP 26 <125 PG/ML   MAGNESIUM    Collection Time: 01/19/23  5:40 PM   Result Value Ref Range    Magnesium 1.7 1.6 - 2.4 mg/dL   COVID-19 RAPID TEST    Collection Time: 01/19/23  5:40 PM   Result Value Ref Range    Specimen source Nasopharyngeal      COVID-19 rapid test Not detected NOTD     INFLUENZA A+B VIRAL AGS    Collection Time: 01/19/23  5:40 PM   Result Value Ref Range    Influenza A Antigen Negative NEG      Influenza B Antigen Negative NEG     PROCALCITONIN    Collection Time: 01/19/23  5:40 PM   Result Value Ref Range    Procalcitonin 0.69 ng/mL   ETHYL ALCOHOL    Collection Time: 01/19/23  5:40 PM   Result Value Ref Range    ALCOHOL(ETHYL),SERUM <10 <10 MG/DL   POC LACTIC ACID    Collection Time: 01/19/23  8:38 PM   Result Value Ref Range    Lactic Acid (POC) 1. 11 0.40 - 2.00 mmol/L   EKG, 12 LEAD, SUBSEQUENT    Collection Time: 01/19/23  8:58 PM   Result Value Ref Range    Ventricular Rate 129 BPM    Atrial Rate 129 BPM    P-R Interval 126 ms    QRS Duration 128 ms    Q-T Interval 338 ms    QTC Calculation (Bezet) 495 ms    Calculated P Axis 40 degrees    Calculated R Axis -27 degrees    Calculated T Axis -12 degrees    Diagnosis       Sinus tachycardia  Right bundle branch block  Abnormal ECG  When compared with ECG of 19-JAN-2023 17:24,  MANUAL COMPARISON REQUIRED, DATA IS UNCONFIRMED

## 2023-01-20 NOTE — PROGRESS NOTES
1500: Bedside shift change report given to Nohemy Rodriguez (oncoming nurse) by Katiana Freed (offgoing nurse).  Report included the following information SBAR, Kardex, Intake/Output, MAR, Recent Results, and Cardiac Rhythm ST .

## 2023-01-20 NOTE — PROGRESS NOTES
Pharmacy Dosing    Pharmacy will automatically change the dose of metronidazole to 500 mg every 12 hours for all indications except for the following:  a.  C. difficile infection  b. CNS infections  c. Non-anaerobic infections (giardiasis, trichomonas, H pylori, etc)     Joe Biggs.  Tova Long, Sh[facility], [facility]

## 2023-01-20 NOTE — PROGRESS NOTES
Bedside and Verbal shift change report given to Sukhdev Hawkins RN(oncoming nurse) by Sandra Torres RN (offgoing nurse). Report included the following information SBAR, Kardex, ED Summary, Intake/Output, MAR, Recent Results, Cardiac Rhythm NSR, ST, and Alarm Parameters . TRANSFER - OUT REPORT:    Verbal report given to [facility], RN(name) on Zunilda [place]  being transferred to 5th floor (unit) for routine progression of care       Report consisted of patients Situation, Background, Assessment and   Recommendations(SBAR). Information from the following report(s) SBAR, Kardex, ED Summary, Intake/Output, MAR, Recent Results, Cardiac Rhythm NSR, ST, and Alarm Parameters  was reviewed with the receiving nurse. Lines:   Peripheral IV 01/19/23 Right Forearm (Active)   Site Assessment Clean, dry, & intact 01/20/23 1617   Phlebitis Assessment 0 01/20/23 1617   Infiltration Assessment 0 01/20/23 1617   Dressing Status Clean, dry, & intact 01/20/23 1617   Dressing Type Transparent 01/20/23 1617   Hub Color/Line Status Pink; Infusing 01/20/23 1617   Action Taken Open ports on tubing capped 01/20/23 1617   Alcohol Cap Used Yes 01/20/23 1617       Peripheral IV 01/20/23 Left Antecubital (Active)   Site Assessment Clean, dry, & intact 01/20/23 1617   Phlebitis Assessment 0 01/20/23 1617   Infiltration Assessment 0 01/20/23 1617   Dressing Status Clean, dry, & intact 01/20/23 1617   Dressing Type Transparent 01/20/23 1617   Hub Color/Line Status Pink;Capped 01/20/23 1617   Action Taken Open ports on tubing capped 01/20/23 1617   Alcohol Cap Used Yes 01/20/23 1617        Opportunity for questions and clarification was provided.       Patient transported with:   O2 @ 1 liters  Tech

## 2023-01-20 NOTE — PROGRESS NOTES
1035  Patient found sitting on stretcher in ultrasound identified with 2 identifiers, for ordered thoracentesis/chest tube placement. Connected to monitor and oxygen. 200 Dr. Dorrie Schlatter at bedside with procedure reviewed with verbalization of understanding and consent obtained. Time out 1046 Start time 1046 End time 1115. Fentanyl 25 mcg given for pain. Right posterior chest 8.5 Fr Pigtail drain connected to pleural vac to low wall suction. 60 cc serosanguinous fluid removed and sent to lab. Stay-fix placed for pigtail . Patient tolerated well. 1 hour post procedural chest x ray ordered placed for 1200.     1135 Patient taken to ER4 and connected back to monitor/oxygen/suction.  Report given to [facility]

## 2023-01-20 NOTE — PROGRESS NOTES
Marcelo Wu [facility]  [address] [employer], [address] [place] [address]  [phone]      Hospitalist Progress Note      NAME: Anuradha Jacobs   :  1960  MRM:  [id number]    Date of service: 2023  6:50 AM       Assessment and Plan:   Loculated R pleural effusion: Chest CTA - np PE, small right loculated pleural effusion. S/p chest tube . Check fluid analysis. On IV Vancomycin, cefepime and flagyl. Appreciated pulmonary evaluation     2. DM 2:  ISS w/ BG checks     4. HTN: Cont w/ Diovan-HCTZ 160/12.5mg daily         Subjective:     Chief Complaint[de-identified] Patient was seen and examined as a follow up for pleural effusion. Chart was reviewed. feels  a little better after chest tube incretion     ROS:  (bold if positive, if negative)    Tolerating PT  Tolerating Diet        Objective:     Last 24hrs VS reviewed since prior progress note.  Most recent are:    Visit Vitals  BP (!) 104/52   Pulse 86   Temp 98.5 °F (36.9 °C)   Resp 17   Ht 5' 2\" (1.575 m)   Wt 88.5 kg (195 lb)   SpO2 92%   BMI 35.67 kg/m²     SpO2 Readings from Last 6 Encounters:   23 92%   21 98%   20 97%   19 98%   10/07/19 98%   09/15/19 96%    O2 Flow Rate (L/min): 2 l/min     Intake/Output Summary (Last 24 hours) at 2023 0650  Last data filed at 2023 0300  Gross per 24 hour   Intake 2600 ml   Output --   Net 2600 ml        Physical Exam:    Gen:  Well-developed, well-nourished, in no acute distress  HEENT:  Pink conjunctivae, PERRL, hearing intact to voice, moist mucous membranes  Neck:  Supple, without masses, thyroid non-tender  Resp:  No accessory muscle use, rales on the RT  Card:  No murmurs, normal S1, S2 without thrills, bruits or peripheral edema  Abd:  Soft, non-tender, non-distended, normoactive bowel sounds are present, no palpable organomegaly and no detectable hernias  Lymph:  No cervical or inguinal adenopathy  Musc:  No cyanosis or clubbing  Skin:  No rashes or ulcers, skin turgor is good  Neuro:  Cranial nerves are grossly intact, no focal motor weakness, follows commands appropriately  Psych:  Good insight, oriented to person, place and time, alert  __________________________________________________________________  Medications Reviewed: (see below)  Medications:     Current Facility-Administered Medications   Medication Dose Route Frequency    valsartan/hydroCHLOROthiazide (DIOVAN HCT) 160/12.5 mg   Oral DAILY    sodium chloride (NS) flush 5-40 mL  5-40 mL IntraVENous Q8H    sodium chloride (NS) flush 5-40 mL  5-40 mL IntraVENous PRN    acetaminophen (TYLENOL) tablet 650 mg  650 mg Oral Q6H PRN    Or    acetaminophen (TYLENOL) suppository 650 mg  650 mg Rectal Q6H PRN    polyethylene glycol (MIRALAX) packet 17 g  17 g Oral DAILY PRN    ondansetron (ZOFRAN ODT) tablet 4 mg  4 mg Oral Q8H PRN    Or    ondansetron (ZOFRAN) injection 4 mg  4 mg IntraVENous Q6H PRN    enoxaparin (LOVENOX) injection 40 mg  40 mg SubCUTAneous DAILY    cefepime (MAXIPIME) 2 g in 0.9% sodium chloride (MBP/ADV) 100 mL MBP  2 g IntraVENous Q8H    lactated Ringers infusion  125 mL/hr IntraVENous CONTINUOUS    insulin lispro (HUMALOG) injection   SubCUTAneous QID WITH MEALS    glucose chewable tablet 16 g  4 Tablet Oral PRN    glucagon (GLUCAGEN) injection 1 mg  1 mg IntraMUSCular PRN    dextrose 10% infusion 0-250 mL  0-250 mL IntraVENous PRN    benzonatate (TESSALON) capsule 100 mg  100 mg Oral TID    guaiFENesin ER (MUCINEX) tablet 1,200 mg  1,200 mg Oral BID    vancomycin (VANCOCIN) 1,000 mg in 0.9% sodium chloride 250 mL ([id number])  1,000 mg IntraVENous Q12H     Current Outpatient Medications   Medication Sig    OneTouch Delica Plus Lancet 33 gauge misc use 1 LANCET to TEST BLOOD SUGAR DAILY    metFORMIN (GLUCOPHAGE) 500 mg tablet take 1 tablet by mouth twice a day with meals    valsartan-hydroCHLOROthiazide (DIOVAN-HCT) 160-12.5 mg per tablet take 1 tablet by mouth once daily    OneTouch Ultra Test strip use 1 TEST STRIP to TEST BLOOD SUGAR once daily    lancets misc Check fasting blood sugars daily. ONETOUCH ULTRAMINI monitoring kit use as directed        Lab Data Reviewed: (see below)  Lab Review:     Recent Labs     01/20/23  0403 01/19/23  1740   WBC 11.5* 12.6*   HGB 11.3* 14.5   HCT 34.1* 42.4    296     Recent Labs     01/20/23  0403 01/19/23  1740   * 128*   K 4.2 2.9*    88*   CO2 27 29   * 395*   BUN 12 13   CREA 0.71 0.90   CA 8.3* 9.3   MG  --  1.7   ALB  --  3.0*   TBILI  --  1.0   ALT  --  28     Lab Results   Component Value Date/Time    Glucose (POC) 404 (H) 01/20/2023 12:36 AM    Glucose (POC) 214 (H) 09/15/2019 11:56 AM    Glucose (POC) 131 (H) 09/15/2019 06:56 AM    Glucose (POC) 184 (H) 09/14/2019 09:11 PM    Glucose (POC) 128 (H) 09/14/2019 04:16 PM     No results for input(s): PH, PCO2, PO2, HCO3, FIO2 in the last 72 hours. No results for input(s): INR, INREXT in the last 72 hours. All Micro Results       Procedure Component Value Units Date/Time    CULTURE, BLOOD, PAIRED [[id number]] Collected: 01/19/23 1900    Order Status: Completed Specimen: Blood Updated: 01/20/23 0618     Special Requests: NO SPECIAL REQUESTS        Culture result: NO GROWTH AFTER 10 HOURS       COVID-19 RAPID TEST [[id number]] Collected: 01/19/23 1740    Order Status: Completed Specimen: Nasopharyngeal Updated: 01/19/23 1811     Specimen source Nasopharyngeal        COVID-19 rapid test Not detected        Comment: Rapid Abbott ID Now       Rapid NAAT:  The specimen is NEGATIVE for SARS-CoV-2, the novel coronavirus associated with COVID-19. Negative results should be treated as presumptive and, if inconsistent with clinical signs and symptoms or necessary for patient management, should be tested with an alternative molecular assay. Negative results do not preclude SARS-CoV-2 infection and should not be used as the sole basis for patient management decisions.        This test has been authorized by the FDA under an Emergency Use Authorization (EUA) for use by authorized laboratories. Fact sheet for Healthcare Providers:  http://www.kamilah.phuong/  Fact sheet for Patients: http://www.kamilah.phuong/       Methodology: Isothermal Nucleic Acid Amplification                 I have reviewed notes of prior 24hr. Other pertinent lab: Total time spent with patient: 35 minutes I personally reviewed chart, notes, data and current medications in the medical record. I have personally examined and treated the patient at bedside during this period.                  Care Plan discussed with: Patient, Nursing Staff, and >50% of time spent in counseling and coordination of care    Discussed:  Care Plan    Prophylaxis:  Lovenox    Disposition:  Home w/Family           ___________________________________________________    Attending Physician: Jorgito Hoyos MD

## 2023-01-20 NOTE — CONSULTS
[facility]     Name: Patricia Mckay MRN: [id number]   : 1960 Hospital: [address]   Date: 2023        Impression Plan   Acute respiratory failure  Hypoxia  Loculated pleural effusion- suspected empyema  Pleuritic chest pain               Will need a chest tube for draining and analyzes of fluid. If draining poorly may need TPA and dornase. Pt agreeable to this  Pleural fluid labs ordered  Wean O2 to keep sats above 90%  Broad abx- vanc/cefepime/flagyl  Will need follow up CT chest in 4-6 weeks  Follow up blood cultures  OOB into chairs  Reviewed imaging with IR         Radiology  ( personally reviewed) CT chest reviewed: small right sided pleural effusion with lateral loculations. No inparenchymal infiltrates. ABG No results for input(s): PHI, PO2I, PCO2I in the last 72 hours. Subjective     Cc: back pain    57 yo with PMHx of HTN presenting with increasing pleuritic chest pain and scapular back pain on the right. States she had a URI over a week ago that initially improved, however then developed low grade fevers and scapular back pain. CT chest revealed a small pleural effusion on the right with loculations. Pt denies any previous lung problems and is a lifelong non-smoker. Currently on 3 liters O2 with sats of 96%    Review of Systems:  A comprehensive review of systems was negative except for that written in the HPI. Past Medical History:   Diagnosis Date    Hypertension     Type 2 diabetes mellitus ([facility] [address]       Past Surgical History:   Procedure Laterality Date    HX CHOLECYSTECTOMY      HX ERCP      HX LAP CHOLECYSTECTOMY  2019      Prior to Admission medications    Medication Sig Start Date End Date Taking?  Authorizing Provider   OneTouch Delica Plus Lancet 33 gauge misc use 1 LANCET to TEST BLOOD SUGAR DAILY 22  Yes Thania Graham MD   metFORMIN (GLUCOPHAGE) 500 mg tablet take 1 tablet by mouth twice a day with meals 10/11/22  Yes Qian Mccarthy MD   valsartan-hydroCHLOROthiazide (DIOVAN-HCT) 160-12.5 mg per tablet take 1 tablet by mouth once daily 10/11/22  Yes Qian Mccarthy MD   OneTouch Ultra Test strip use 1 TEST STRIP to TEST BLOOD SUGAR once daily 6/14/22  Yes Qian Mccarthy MD   lancets misc Check fasting blood sugars daily. 8/22/19  Yes Qian Mccarthy MD   Maudine Tremayne monitoring kit use as directed 8/22/19  Yes Qian Mccarthy MD     Current Facility-Administered Medications   Medication Dose Route Frequency    metroNIDAZOLE (FLAGYL) IVPB premix 500 mg  500 mg IntraVENous Q12H    valsartan/hydroCHLOROthiazide (DIOVAN HCT) 160/12.5 mg   Oral DAILY    sodium chloride (NS) flush 5-40 mL  5-40 mL IntraVENous Q8H    enoxaparin (LOVENOX) injection 40 mg  40 mg SubCUTAneous DAILY    cefepime (MAXIPIME) 2 g in 0.9% sodium chloride (MBP/ADV) 100 mL MBP  2 g IntraVENous Q8H    lactated Ringers infusion  125 mL/hr IntraVENous CONTINUOUS    insulin lispro (HUMALOG) injection   SubCUTAneous QID WITH MEALS    benzonatate (TESSALON) capsule 100 mg  100 mg Oral TID    guaiFENesin ER (MUCINEX) tablet 1,200 mg  1,200 mg Oral BID    vancomycin (VANCOCIN) 1,000 mg in 0.9% sodium chloride 250 mL ([id number])  1,000 mg IntraVENous Q12H     No Known Allergies   Social History     Tobacco Use    Smoking status: Never    Smokeless tobacco: Never   Substance Use Topics    Alcohol use: Yes     Alcohol/week: 1.0 standard drink     Types: 1 Glasses of wine per week     Comment: 1-3 drinks per month      Family History   Problem Relation Age of Onset    Diabetes Mother         type 2    Hypertension Mother     Diabetes Sister     Congenital heart defect Maternal Grandmother           Laboratory: I have personally reviewed the critical care flowsheet and labs.      Recent Labs     01/20/23  0403 01/19/23  1740   WBC 11.5* 12.6*   HGB 11.3* 14.5   HCT 34.1* 42.4    296     Recent Labs     01/20/23  0403 01/19/23  1740   * 128*   K 4.2 2.9*    88*   CO2 27 29   * 395*   BUN 12 13   CREA 0.71 0.90   CA 8.3* 9.3   MG  --  1.7   ALB  --  3.0*   ALT  --  28       Objective:     Mode Rate Tidal Volume Pressure FiO2 PEEP                    Vital Signs:     TMAX(24)      Intake/Output:   Last shift:         Last 3 shifts: No intake/output data recorded. [id number]  Intake/Output Summary (Last 24 hours) at 1/20/2023 0814  Last data filed at 1/20/2023 0300  Gross per 24 hour   Intake 2600 ml   Output --   Net 2600 ml     EXAM:   GENERAL: well developed, awake, alert, HEENT:  PERRL, EOMI, no alar flaring or epistaxis, oral mucosa moist without cyanosis, NECK:  no jugular vein distention, no retractions, no thyromegaly or masses, LUNGS: CTA, but not able to take deep breaths without pain or coughing HEART:  Regular rate and rhythm with no MGR; no edema is present, ABDOMEN:  soft with no tenderness, bowel sounds present, EXTREMITIES:  warm with no cyanosis, SKIN:  no jaundice or ecchymosis, and NEUROLOGIC:  alert and oriented, grossly non-focal    Donald Crabtree MD  [facility]

## 2023-01-20 NOTE — ED PROVIDER NOTES
Is a 61-year-old female with past medical history significant for type 2 diabetes and prior cholecystectomy who presents the ED for shortness of breath and cough with occasional production of brown sputum. Patient states that symptoms started about a week ago with upper respiratory type symptoms and seems to have progressed. She states she not normally on oxygen denies smoking history. Patient notes some discomfort with deep breaths. Denies peripheral swelling or other complaints. Past Medical History:   Diagnosis Date    Hypertension     Type 2 diabetes mellitus ([facility] [address]        Past Surgical History:   Procedure Laterality Date    HX CHOLECYSTECTOMY      HX ERCP      HX LAP CHOLECYSTECTOMY  08/11/2019         Family History:   Problem Relation Age of Onset    Diabetes Mother         type 2    Hypertension Mother     Diabetes Sister     Congenital heart defect Maternal Grandmother        Social History     Socioeconomic History    Marital status:      Spouse name: Not on file    Number of children: Not on file    Years of education: Not on file    Highest education level: Not on file   Occupational History    Not on file   Tobacco Use    Smoking status: Never    Smokeless tobacco: Never   Vaping Use    Vaping Use: Never used   Substance and Sexual Activity    Alcohol use: Yes     Alcohol/week: 1.0 standard drink     Types: 1 Glasses of wine per week     Comment: 1-3 drinks per month    Drug use: Never    Sexual activity: Yes     Partners: Male   Other Topics Concern    Not on file   Social History Narrative    Not on file     Social Determinants of Health     Financial Resource Strain: Not on file   Food Insecurity: Not on file   Transportation Needs: Not on file   Physical Activity: Not on file   Stress: Not on file   Social Connections: Not on file   Intimate Partner Violence: Not on file   Housing Stability: Not on file         ALLERGIES: Patient has no known allergies.     Review of Systems Constitutional:  Positive for activity change, chills and fatigue. HENT:  Negative for drooling. Respiratory:  Positive for cough and shortness of breath. Cardiovascular:  Negative for chest pain. Gastrointestinal:  Negative for abdominal pain. Musculoskeletal:  Negative for neck pain and neck stiffness. Skin:  Negative for rash. Neurological:  Negative for syncope. Hematological:  Does not bruise/bleed easily. Psychiatric/Behavioral: Negative. Vitals:    01/19/23 1729 01/19/23 1756 01/19/23 1832   BP: (!) 145/97 105/61    Pulse: (!) 134 (!) 131 (!) 130   Resp: 20 22    Temp: 100.4 °F (38 °C)     SpO2: 93% 97%    Weight: 88.5 kg (195 lb)     Height: 5' 2\" (1.575 m)              Physical Exam  Vitals and nursing note reviewed. Constitutional:       Appearance: She is not diaphoretic. HENT:      Head: Atraumatic. Neck:      Trachea: No tracheal deviation. Cardiovascular:      Rate and Rhythm: Tachycardia present. Pulmonary:      Effort: Tachypnea (Minimal) present. Breath sounds: No stridor. Examination of the right-lower field reveals decreased breath sounds and rales. Decreased breath sounds and rales present. Chest:      Chest wall: No deformity or crepitus. Abdominal:      Palpations: Abdomen is soft. Musculoskeletal:      Right lower leg: No tenderness. No edema. Left lower leg: No tenderness. No edema. Skin:     General: Skin is warm and dry. Neurological:      Mental Status: She is alert and oriented to person, place, and time. Psychiatric:         Mood and Affect: Mood normal.         Behavior: Behavior normal.        Medical Decision Making  Patient is a 58-year-old female who presents with increasing shortness of breath and productive sputum. Differential diagnosis includes aspiration, pneumonia, PE, mass, COVID, flu, sepsis.   Work-up significant for hyperglycemia, hypokalemia and tachycardia    Amount and/or Complexity of Data Reviewed  Labs: ordered. Radiology: ordered. ECG/medicine tests: ordered and independent interpretation performed. Risk  Prescription drug management. Decision regarding hospitalization. ED Course as of 01/19/23 1941   Thu Jan 19, 2023   1743 EcG obtained at 1724 reported independently by myself showing sinus tachycardia, rate 131, right bundle branch block, rate increased and bundle branch block new compared to prior EKG. Some baseline artifact limits interpretation. [JE]   1926 Not DKA as anion gap is 11 [JE]      ED Course User Index  [JE] Taran Brown MD       Critical Care  Performed by: Taran Brown MD  Authorized by: Taran Brown MD     Critical care provider statement:     Critical care time (minutes):  60    Critical care time was exclusive of:  Separately billable procedures and treating other patients    Critical care was necessary to treat or prevent imminent or life-threatening deterioration of the following conditions:  Respiratory failure and sepsis    Critical care was time spent personally by me on the following activities:  Ordering and performing treatments and interventions, ordering and review of laboratory studies, development of treatment plan with patient or surrogate, ordering and review of radiographic studies, pulse oximetry, re-evaluation of patient's condition, review of old charts, evaluation of patient's response to treatment, examination of patient and obtaining history from patient or surrogate    I assumed direction of critical care for this patient from another provider in my specialty: no      Care discussed with: admitting provider        Perfect Serve Consult for Admission  7:54 PM    ED Room Number: ER01/01  Patient Name and age:  Cristino Zhao 58 y.o.  female  Working Diagnosis:   1. Tachycardia    2. Sepsis, due to unspecified organism, unspecified whether acute organ dysfunction present ([facility] [address]    3. Hyperglycemia    4.  Hyponatremia        COVID-19 Suspicion:  yes but test neg  Sepsis present:  yes  Reassessment needed: no  Code Status:  Full Code  Readmission: no  Isolation Requirements:  no  Recommended Level of Care:  step down  Department:[facility] - [phone]  Other: Is a 58-year-old female who presents with upper respiratory symptoms for the past week or so now with shortness of breath and desats into the upper 80s on room air. Not normally on oxygen. Patient with apparent pneumonia versus mass in the right lower lung with associated pleural effusion. No increased work of breathing at rest on few liters nasal cannula.  CTA read pending

## 2023-01-21 ENCOUNTER — APPOINTMENT (OUTPATIENT)
Dept: CT IMAGING | Age: 63
DRG: 166 | End: 2023-01-21
Attending: INTERNAL MEDICINE
Payer: COMMERCIAL

## 2023-01-21 LAB
BACTERIA SPEC CULT: NORMAL
BACTERIA SPEC CULT: NORMAL
CREAT SERPL-MCNC: 0.66 MG/DL (ref 0.55–1.02)
GLUCOSE BLD STRIP.AUTO-MCNC: 289 MG/DL (ref 65–117)
GLUCOSE BLD STRIP.AUTO-MCNC: 318 MG/DL (ref 65–117)
GLUCOSE BLD STRIP.AUTO-MCNC: 318 MG/DL (ref 65–117)
GLUCOSE BLD STRIP.AUTO-MCNC: 340 MG/DL (ref 65–117)
SERVICE CMNT-IMP: ABNORMAL
SERVICE CMNT-IMP: NORMAL

## 2023-01-21 PROCEDURE — 74011250637 HC RX REV CODE- 250/637: Performed by: INTERNAL MEDICINE

## 2023-01-21 PROCEDURE — 74011000250 HC RX REV CODE- 250: Performed by: INTERNAL MEDICINE

## 2023-01-21 PROCEDURE — 74011250636 HC RX REV CODE- 250/636: Performed by: INTERNAL MEDICINE

## 2023-01-21 PROCEDURE — 82962 GLUCOSE BLOOD TEST: CPT

## 2023-01-21 PROCEDURE — 74011636637 HC RX REV CODE- 636/637: Performed by: INTERNAL MEDICINE

## 2023-01-21 PROCEDURE — 71250 CT THORAX DX C-: CPT

## 2023-01-21 PROCEDURE — 74011250636 HC RX REV CODE- 250/636: Performed by: NURSE PRACTITIONER

## 2023-01-21 PROCEDURE — 74011000250 HC RX REV CODE- 250: Performed by: NURSE PRACTITIONER

## 2023-01-21 PROCEDURE — 74011000258 HC RX REV CODE- 258: Performed by: INTERNAL MEDICINE

## 2023-01-21 PROCEDURE — 65270000029 HC RM PRIVATE

## 2023-01-21 PROCEDURE — 77010033678 HC OXYGEN DAILY

## 2023-01-21 RX ADMIN — VANCOMYCIN HYDROCHLORIDE 1250 MG: 1.25 INJECTION, POWDER, LYOPHILIZED, FOR SOLUTION INTRAVENOUS at 12:17

## 2023-01-21 RX ADMIN — ALTEPLASE: 2.2 INJECTION, POWDER, LYOPHILIZED, FOR SOLUTION INTRAVENOUS at 10:19

## 2023-01-21 RX ADMIN — SODIUM CHLORIDE, POTASSIUM CHLORIDE, SODIUM LACTATE AND CALCIUM CHLORIDE 125 ML/HR: 600; 310; 30; 20 INJECTION, SOLUTION INTRAVENOUS at 01:02

## 2023-01-21 RX ADMIN — BENZONATATE 100 MG: 100 CAPSULE ORAL at 08:14

## 2023-01-21 RX ADMIN — GUAIFENESIN 1200 MG: 600 TABLET ORAL at 08:14

## 2023-01-21 RX ADMIN — INSULIN LISPRO 7 UNITS: 100 INJECTION, SOLUTION INTRAVENOUS; SUBCUTANEOUS at 08:14

## 2023-01-21 RX ADMIN — Medication 10 ML: at 22:35

## 2023-01-21 RX ADMIN — METRONIDAZOLE 500 MG: 500 INJECTION, SOLUTION INTRAVENOUS at 22:29

## 2023-01-21 RX ADMIN — DORNASE ALFA: 1 SOLUTION RESPIRATORY (INHALATION) at 10:20

## 2023-01-21 RX ADMIN — VALSARTAN: 80 TABLET ORAL at 08:14

## 2023-01-21 RX ADMIN — Medication 10 ML: at 14:00

## 2023-01-21 RX ADMIN — INSULIN LISPRO 4 UNITS: 100 INJECTION, SOLUTION INTRAVENOUS; SUBCUTANEOUS at 22:29

## 2023-01-21 RX ADMIN — CEFEPIME 2 G: 20 INJECTION, POWDER, FOR SOLUTION INTRAVENOUS at 12:17

## 2023-01-21 RX ADMIN — Medication 10 ML: at 05:53

## 2023-01-21 RX ADMIN — CEFEPIME 2 G: 20 INJECTION, POWDER, FOR SOLUTION INTRAVENOUS at 05:51

## 2023-01-21 RX ADMIN — METRONIDAZOLE 500 MG: 500 INJECTION, SOLUTION INTRAVENOUS at 07:08

## 2023-01-21 RX ADMIN — ENOXAPARIN SODIUM 40 MG: 100 INJECTION SUBCUTANEOUS at 08:14

## 2023-01-21 RX ADMIN — SODIUM CHLORIDE, POTASSIUM CHLORIDE, SODIUM LACTATE AND CALCIUM CHLORIDE 75 ML/HR: 600; 310; 30; 20 INJECTION, SOLUTION INTRAVENOUS at 17:25

## 2023-01-21 RX ADMIN — BENZONATATE 100 MG: 100 CAPSULE ORAL at 16:33

## 2023-01-21 RX ADMIN — INSULIN HUMAN 20 UNITS: 100 INJECTION, SUSPENSION SUBCUTANEOUS at 16:33

## 2023-01-21 RX ADMIN — GUAIFENESIN 1200 MG: 600 TABLET ORAL at 22:29

## 2023-01-21 RX ADMIN — INSULIN LISPRO 7 UNITS: 100 INJECTION, SOLUTION INTRAVENOUS; SUBCUTANEOUS at 12:17

## 2023-01-21 RX ADMIN — VANCOMYCIN HYDROCHLORIDE 1250 MG: 1.25 INJECTION, POWDER, LYOPHILIZED, FOR SOLUTION INTRAVENOUS at 00:56

## 2023-01-21 RX ADMIN — INSULIN LISPRO 5 UNITS: 100 INJECTION, SOLUTION INTRAVENOUS; SUBCUTANEOUS at 16:33

## 2023-01-21 RX ADMIN — BENZONATATE 100 MG: 100 CAPSULE ORAL at 22:29

## 2023-01-21 NOTE — PROGRESS NOTES
Bedside shift change report given to [facility] LPN (oncoming nurse) by Leslye Meyer RN (offgoing nurse). Report included the following information SBAR, MAR, and Recent Results.

## 2023-01-21 NOTE — PROGRESS NOTES
End of Shift Note    Bedside shift change report given to Adrienne Valencia RN (oncoming nurse) by Maurisio Hogue RN (offgoing nurse). Report included the following information SBAR, Kardex, Intake/Output, MAR, and Recent Results. Patient chest tube put out 8ml of output and is to low continuous wall suction.      MARICEL TiwariN, RN

## 2023-01-21 NOTE — PROGRESS NOTES
Marcelo Wu [facility]  [address] [facility], [address] [place] [address]  [phone]      Hospitalist Progress Note      NAME: Jasbir Stock   :  1960  MRM:  [id number]    Date of service: 2023  6:50 AM       Assessment and Plan:   Loculated R pleural effusion: Chest CTA - no PE, small right loculated pleural effusion. S/p chest tube . On TPA and dornase. Check fluid analysis. On IV Vancomycin, cefepime and flagyl. Appreciated pulmonary evaluation     2. DM 2:  ISS w/ BG checks. Added NPH. Hold home metformin. Check A1C     4. HTN: Cont w/ Diovan-HCTZ 160/12.5mg daily         Subjective:     Chief Complaint[de-identified] Patient was seen and examined as a follow up for pleural effusion. Chart was reviewed. Chest pain better after chest tube placement     ROS:  (bold if positive, if negative)    Tolerating PT  Tolerating Diet        Objective:     Last 24hrs VS reviewed since prior progress note.  Most recent are:    Visit Vitals  /81 (BP 1 Location: Right upper arm, BP Patient Position: At rest)   Pulse (!) 115   Temp 98.4 °F (36.9 °C)   Resp 17   Ht 5' 2\" (1.575 m)   Wt 88.5 kg (195 lb)   SpO2 90%   BMI 35.67 kg/m²     SpO2 Readings from Last 6 Encounters:   23 90%   21 98%   20 97%   19 98%   10/07/19 98%   09/15/19 96%    O2 Flow Rate (L/min): 1 l/min     Intake/Output Summary (Last 24 hours) at 2023 1014  Last data filed at 2023 0751  Gross per 24 hour   Intake 4579.17 ml   Output 34 ml   Net 4545.17 ml          Physical Exam:    Gen:  Well-developed, well-nourished, in no acute distress  HEENT:  Pink conjunctivae, PERRL, hearing intact to voice, moist mucous membranes  Neck:  Supple, without masses, thyroid non-tender  Resp:  No accessory muscle use, rales on the RT  Card:  No murmurs, normal S1, S2 without thrills, bruits or peripheral edema  Abd:  Soft, non-tender, non-distended, normoactive bowel sounds are present, no palpable organomegaly and no detectable hernias  Lymph:  No cervical or inguinal adenopathy  Musc:  No cyanosis or clubbing  Skin:  No rashes or ulcers, skin turgor is good  Neuro:  Cranial nerves are grossly intact, no focal motor weakness, follows commands appropriately  Psych:  Good insight, oriented to person, place and time, alert  __________________________________________________________________  Medications Reviewed: (see below)  Medications:     Current Facility-Administered Medications   Medication Dose Route Frequency    dornase david 5 mg in sterile water intraPLEUral soln   IntraPLEUral Q24H    alteplase 10 mg in ns intraPLEUral soln   IntraPLEUral Q24H    insulin NPH (NOVOLIN N, HUMULIN N) injection 20 Units  20 Units SubCUTAneous ACB&D    metroNIDAZOLE (FLAGYL) IVPB premix 500 mg  500 mg IntraVENous Q12H    vancomycin (VANCOCIN) 1,250 mg in 0.9% sodium chloride 250 mL ([id number])  1,250 mg IntraVENous Q12H    valsartan/hydroCHLOROthiazide (DIOVAN HCT) 160/12.5 mg   Oral DAILY    sodium chloride (NS) flush 5-40 mL  5-40 mL IntraVENous Q8H    sodium chloride (NS) flush 5-40 mL  5-40 mL IntraVENous PRN    acetaminophen (TYLENOL) tablet 650 mg  650 mg Oral Q6H PRN    Or    acetaminophen (TYLENOL) suppository 650 mg  650 mg Rectal Q6H PRN    polyethylene glycol (MIRALAX) packet 17 g  17 g Oral DAILY PRN    ondansetron (ZOFRAN ODT) tablet 4 mg  4 mg Oral Q8H PRN    Or    ondansetron (ZOFRAN) injection 4 mg  4 mg IntraVENous Q6H PRN    enoxaparin (LOVENOX) injection 40 mg  40 mg SubCUTAneous DAILY    cefepime (MAXIPIME) 2 g in 0.9% sodium chloride (MBP/ADV) 100 mL MBP  2 g IntraVENous Q8H    lactated Ringers infusion  125 mL/hr IntraVENous CONTINUOUS    insulin lispro (HUMALOG) injection   SubCUTAneous QID WITH MEALS    glucose chewable tablet 16 g  4 Tablet Oral PRN    glucagon (GLUCAGEN) injection 1 mg  1 mg IntraMUSCular PRN    dextrose 10% infusion 0-250 mL  0-250 mL IntraVENous PRN    benzonatate (TESSALON) capsule 100 mg  100 mg Oral TID    guaiFENesin ER (MUCINEX) tablet 1,200 mg  1,200 mg Oral BID        Lab Data Reviewed: (see below)  Lab Review:     Recent Labs     01/20/23  0403 01/19/23  1740   WBC 11.5* 12.6*   HGB 11.3* 14.5   HCT 34.1* 42.4    296       Recent Labs     01/20/23  2133 01/20/23  0403 01/19/23  1740   NA  --  135* 128*   K  --  4.2 2.9*   CL  --  100 88*   CO2  --  27 29   GLU  --  414* 395*   BUN  --  12 13   CREA 0.66 0.71 0.90   CA  --  8.3* 9.3   MG  --   --  1.7   ALB  --   --  3.0*   TBILI  --   --  1.0   ALT  --   --  28       Lab Results   Component Value Date/Time    Glucose (POC) 340 (H) 01/21/2023 07:18 AM    Glucose (POC) 343 (H) 01/20/2023 10:31 PM    Glucose (POC) 312 (H) 01/20/2023 05:23 PM    Glucose (POC) 331 (H) 01/20/2023 11:53 AM    Glucose (POC) 383 (H) 01/20/2023 08:22 AM     No results for input(s): PH, PCO2, PO2, HCO3, FIO2 in the last 72 hours. No results for input(s): INR, INREXT, INREXT in the last 72 hours. All Micro Results       Procedure Component Value Units Date/Time    CULTURE, BLOOD, PAIRED [[id number]] Collected: 01/19/23 1900    Order Status: Completed Specimen: Blood Updated: 01/21/23 0727     Special Requests: NO SPECIAL REQUESTS        Culture result: NO GROWTH 2 DAYS       CULTURE, MRSA [[id number]] Collected: 01/20/23 1424    Order Status: Sent Specimen: Nasal from Nares Updated: 01/20/23 1850    CULTURE, BODY FLUID [[id number]] Collected: 01/20/23 1051    Order Status: Completed Specimen:  Body Fluid from Pleural Fluid Updated: 01/20/23 1751     Special Requests: NO SPECIAL REQUESTS        GRAM STAIN FEW WBCS SEEN         NO ORGANISMS SEEN        Culture result: PENDING    CULTURE, FUNGUS [[id number]] Collected: 01/20/23 1051    Order Status: Sent Specimen: Pleural Fluid Updated: 01/20/23 1557    AFB CULTURE + SMEAR W/RFLX ID FROM CULTURE [[id number]]     Order Status: Sent     COVID-19 RAPID TEST [[id number]] Collected: 01/19/23 1740    Order Status: Completed Specimen: Nasopharyngeal Updated: 01/19/23 1811     Specimen source Nasopharyngeal        COVID-19 rapid test Not detected        Comment: Rapid Abbott ID Now       Rapid NAAT:  The specimen is NEGATIVE for SARS-CoV-2, the novel coronavirus associated with COVID-19. Negative results should be treated as presumptive and, if inconsistent with clinical signs and symptoms or necessary for patient management, should be tested with an alternative molecular assay. Negative results do not preclude SARS-CoV-2 infection and should not be used as the sole basis for patient management decisions. This test has been authorized by the FDA under an Emergency Use Authorization (EUA) for use by authorized laboratories. Fact sheet for Healthcare Providers:  http://www.jesus-kyle.phuong/  Fact sheet for Patients: http://www.jesus-kyle.phuong/       Methodology: Isothermal Nucleic Acid Amplification                 I have reviewed notes of prior 24hr. Other pertinent lab: Total time spent with patient: 35 minutes I personally reviewed chart, notes, data and current medications in the medical record. I have personally examined and treated the patient at bedside during this period.                  Care Plan discussed with: Patient, Nursing Staff, and >50% of time spent in counseling and coordination of care    Discussed:  Care Plan    Prophylaxis:  Lovenox    Disposition:  Home w/Family           ___________________________________________________    Attending Physician: Kati Roche MD

## 2023-01-21 NOTE — CONSULTS
[facility]OC[facility]     Name: Krystal Vu MRN: [id number]   : 1960 Hospital: [address]   Date: 2023        Impression Plan   Acute respiratory failure  Hypoxia  Loculated pleural effusion- suspected empyema  Pleuritic chest pain               S/P chest tube for draining and analyzes of fluid 23. Will plan for TPA and dornase. Pt agreeable to this. F/U pleural fluid labs  Wean O2 to keep sats above 90% - currently on RA  Broad abx- vanc/cefepime/flagyl  Will need follow up CT chest in 4-6 weeks  Follow up blood cultures  OOB into chair       Radiology  ( personally reviewed) CT chest reviewed: small right sided pleural effusion with lateral loculations. No inparenchymal infiltrates. ABG No results for input(s): PHI, PO2I, PCO2I in the last 72 hours. Subjective     Cc: back pain    57 yo with PMHx of HTN presenting with increasing pleuritic chest pain and scapular back pain on the right. States she had a URI over a week ago that initially improved, however then developed low grade fevers and scapular back pain. CT chest revealed a small pleural effusion on the right with loculations. Pt denies any previous lung problems and is a lifelong non-smoker. Currently on 3 liters O2 with sats of 96%    23 : Sitting up in bed. Overall feeling a little better today. Minimal out from pleural drain. We discussed TPA/Dornase. Have d/w pharmacy and will plan to administer today when available. Sarah Cone:; pt seen, the tube drainage post first dose of tpa/dornase this am is now very bloody, tube flushes ok, I decided not to repeat infustion for now to avoid hemothorax, will get repeat ct tonight to assess how much residual fluid there is, suction increased on tube. Review of Systems:  A comprehensive review of systems was negative except for that written in the HPI.     Past Medical History:   Diagnosis Date    Hypertension     Type 2 diabetes mellitus ([address]       Past Surgical History:   Procedure Laterality Date    HX CHOLECYSTECTOMY      HX ERCP      HX LAP CHOLECYSTECTOMY  08/11/2019      Prior to Admission medications    Medication Sig Start Date End Date Taking? Authorizing Provider   ascorbic acid, vitamin C, (Vitamin C) 500 mg tablet Take 1,000 mg by mouth daily. Yes Provider, Historical   cholecalciferol (Vitamin D3) 25 mcg (1,000 unit) cap Take 1,000 Units by mouth daily. Yes Provider, Historical   OneTouch Delica Plus Lancet 33 gauge misc use 1 LANCET to TEST BLOOD SUGAR DAILY 12/13/22  Yes Cat Alas MD   valsartan-hydroCHLOROthiazide (DIOVAN-HCT) 160-12.5 mg per tablet take 1 tablet by mouth once daily 10/11/22  Yes Cat Alas MD   metFORMIN (GLUCOPHAGE) 500 mg tablet take 1 tablet by mouth twice a day with meals 10/11/22   Cat Alas MD   OneTouch Ultra Test strip use 1 TEST STRIP to TEST BLOOD SUGAR once daily 6/14/22   Cat Alas MD   lancets misc Check fasting blood sugars daily.  8/22/19   Cat Alas MD   [facility] monitoring kit use as directed 8/22/19   Cat Alas MD     Current Facility-Administered Medications   Medication Dose Route Frequency    metroNIDAZOLE (FLAGYL) IVPB premix 500 mg  500 mg IntraVENous Q12H    vancomycin (VANCOCIN) 1,250 mg in 0.9% sodium chloride 250 mL ([id number])  1,250 mg IntraVENous Q12H    valsartan/hydroCHLOROthiazide (DIOVAN HCT) 160/12.5 mg   Oral DAILY    sodium chloride (NS) flush 5-40 mL  5-40 mL IntraVENous Q8H    enoxaparin (LOVENOX) injection 40 mg  40 mg SubCUTAneous DAILY    cefepime (MAXIPIME) 2 g in 0.9% sodium chloride (MBP/ADV) 100 mL MBP  2 g IntraVENous Q8H    lactated Ringers infusion  125 mL/hr IntraVENous CONTINUOUS    insulin lispro (HUMALOG) injection   SubCUTAneous QID WITH MEALS    benzonatate (TESSALON) capsule 100 mg  100 mg Oral TID    guaiFENesin ER (MUCINEX) tablet 1,200 mg  1,200 mg Oral BID     No Known Allergies   Social History     Tobacco Use Smoking status: Never    Smokeless tobacco: Never   Substance Use Topics    Alcohol use: Yes     Alcohol/week: 1.0 standard drink     Types: 1 Glasses of wine per week     Comment: 1-3 drinks per month      Family History   Problem Relation Age of Onset    Diabetes Mother         type 2    Hypertension Mother     Diabetes Sister     Congenital heart defect Maternal Grandmother           Laboratory: I have personally reviewed the critical care flowsheet and labs. Recent Labs     01/20/23  0403 01/19/23  1740   WBC 11.5* 12.6*   HGB 11.3* 14.5   HCT 34.1* 42.4    296       Recent Labs     01/20/23  2133 01/20/23  0403 01/19/23  1740   NA  --  135* 128*   K  --  4.2 2.9*   CL  --  100 88*   CO2  --  27 29   GLU  --  414* 395*   BUN  --  12 13   CREA 0.66 0.71 0.90   CA  --  8.3* 9.3   MG  --   --  1.7   ALB  --   --  3.0*   ALT  --   --  28         Objective:     Mode Rate Tidal Volume Pressure FiO2 PEEP                    Vital Signs:     TMAX(24)      Intake/Output:   Last shift:         Last 3 shifts: 01/21 0701 - 01/21 1900  In: 400 [P.O.:300;  I.V.:100]  Out: 8 [Drains:8]RRIOLAST3  Intake/Output Summary (Last 24 hours) at 1/21/2023 0919  Last data filed at 1/21/2023 0751  Gross per 24 hour   Intake 4579.17 ml   Output 34 ml   Net 4545.17 ml       EXAM:   GENERAL: well developed, awake, alert, HEENT:  PERRL, EOMI, no alar flaring or epistaxis, oral mucosa moist without cyanosis, NECK:  no jugular vein distention, no retractions, no thyromegaly or masses, LUNGS: CTA, but not able to take deep breaths without pain or coughing HEART:  Regular rate and rhythm with no MGR; no edema is present, ABDOMEN:  soft with no tenderness, bowel sounds present, EXTREMITIES:  warm with no cyanosis, SKIN:  no jaundice or ecchymosis, and NEUROLOGIC:  alert and oriented, grossly non-focal    Rhetta Pila, NP  [facility]

## 2023-01-21 NOTE — PROGRESS NOTES
[address] [address] Pharmacy Dosing Services: Antimicrobial Stewardship Daily Doc    Consult for antibiotic dosing of Vancomycin by Dr. Kamila Urena  Indication: CAP;Empyema  Day of Therapy: 2    Ht Readings from Last 1 Encounters:   01/19/23 157.5 cm (62\")        Wt Readings from Last 1 Encounters:   01/19/23 88.5 kg (195 lb)        Vancomycin therapy:  Current maintenance dose: vancomycin 1000 mg IV every 12 hours   Last level: 7.7 mcg/mL today at 2133, drawn ~9.5/10 hours after previous dose. Extrapolates to . Dose calculated to approximate a           a. Target AUC/ISABELLA of 400-600          b. Trough of N/A     Plan: Level subtherapeutic. Will increase vancomycin to 1250 mg IV every 12 hours starting tonight. Recommend repeat level 01/22 AM or sooner if clinically indicated. Level not yet ordered.        Pharmacist GIULIANO Kamara

## 2023-01-21 NOTE — PROGRESS NOTES
Bedside shift change report given to Jacob RN (oncoming nurse) by Lanette Broderick RN (offgoing nurse). Report included the following information SBAR, MAR, and Med Rec Status.

## 2023-01-22 ENCOUNTER — APPOINTMENT (OUTPATIENT)
Dept: GENERAL RADIOLOGY | Age: 63
DRG: 166 | End: 2023-01-22
Attending: NURSE PRACTITIONER
Payer: COMMERCIAL

## 2023-01-22 LAB
ANION GAP SERPL CALC-SCNC: 8 MMOL/L (ref 5–15)
BASOPHILS # BLD: 0 K/UL (ref 0–0.1)
BASOPHILS NFR BLD: 0 % (ref 0–1)
BUN SERPL-MCNC: 8 MG/DL (ref 6–20)
BUN/CREAT SERPL: 14 (ref 12–20)
CALCIUM SERPL-MCNC: 8.6 MG/DL (ref 8.5–10.1)
CHLORIDE SERPL-SCNC: 97 MMOL/L (ref 97–108)
CO2 SERPL-SCNC: 29 MMOL/L (ref 21–32)
CREAT SERPL-MCNC: 0.59 MG/DL (ref 0.55–1.02)
DIFFERENTIAL METHOD BLD: ABNORMAL
EOSINOPHIL # BLD: 0.1 K/UL (ref 0–0.4)
EOSINOPHIL NFR BLD: 0 % (ref 0–7)
ERYTHROCYTE [DISTWIDTH] IN BLOOD BY AUTOMATED COUNT: 13.3 % (ref 11.5–14.5)
EST. AVERAGE GLUCOSE BLD GHB EST-MCNC: 306 MG/DL
GLUCOSE BLD STRIP.AUTO-MCNC: 263 MG/DL (ref 65–117)
GLUCOSE BLD STRIP.AUTO-MCNC: 271 MG/DL (ref 65–117)
GLUCOSE BLD STRIP.AUTO-MCNC: 292 MG/DL (ref 65–117)
GLUCOSE BLD STRIP.AUTO-MCNC: 295 MG/DL (ref 65–117)
GLUCOSE SERPL-MCNC: 321 MG/DL (ref 65–100)
HBA1C MFR BLD: 12.3 % (ref 4–5.6)
HCT VFR BLD AUTO: 36.3 % (ref 35–47)
HGB BLD-MCNC: 12.1 G/DL (ref 11.5–16)
IMM GRANULOCYTES # BLD AUTO: 0.1 K/UL (ref 0–0.04)
IMM GRANULOCYTES NFR BLD AUTO: 1 % (ref 0–0.5)
LYMPHOCYTES # BLD: 1.6 K/UL (ref 0.8–3.5)
LYMPHOCYTES NFR BLD: 12 % (ref 12–49)
MCH RBC QN AUTO: 27.9 PG (ref 26–34)
MCHC RBC AUTO-ENTMCNC: 33.3 G/DL (ref 30–36.5)
MCV RBC AUTO: 83.6 FL (ref 80–99)
MONOCYTES # BLD: 1.2 K/UL (ref 0–1)
MONOCYTES NFR BLD: 9 % (ref 5–13)
NEUTS SEG # BLD: 10.4 K/UL (ref 1.8–8)
NEUTS SEG NFR BLD: 78 % (ref 32–75)
NRBC # BLD: 0 K/UL (ref 0–0.01)
NRBC BLD-RTO: 0 PER 100 WBC
PLATELET # BLD AUTO: 227 K/UL (ref 150–400)
PMV BLD AUTO: 10.6 FL (ref 8.9–12.9)
POTASSIUM SERPL-SCNC: 3.4 MMOL/L (ref 3.5–5.1)
RBC # BLD AUTO: 4.34 M/UL (ref 3.8–5.2)
SERVICE CMNT-IMP: ABNORMAL
SODIUM SERPL-SCNC: 134 MMOL/L (ref 136–145)
VANCOMYCIN SERPL-MCNC: 8.8 UG/ML
WBC # BLD AUTO: 13.5 K/UL (ref 3.6–11)

## 2023-01-22 PROCEDURE — 74011636637 HC RX REV CODE- 636/637: Performed by: INTERNAL MEDICINE

## 2023-01-22 PROCEDURE — 83036 HEMOGLOBIN GLYCOSYLATED A1C: CPT

## 2023-01-22 PROCEDURE — 74011250637 HC RX REV CODE- 250/637: Performed by: INTERNAL MEDICINE

## 2023-01-22 PROCEDURE — 74011250636 HC RX REV CODE- 250/636: Performed by: INTERNAL MEDICINE

## 2023-01-22 PROCEDURE — 80048 BASIC METABOLIC PNL TOTAL CA: CPT

## 2023-01-22 PROCEDURE — 80202 ASSAY OF VANCOMYCIN: CPT

## 2023-01-22 PROCEDURE — 82962 GLUCOSE BLOOD TEST: CPT

## 2023-01-22 PROCEDURE — 85025 COMPLETE CBC W/AUTO DIFF WBC: CPT

## 2023-01-22 PROCEDURE — 74011000250 HC RX REV CODE- 250: Performed by: INTERNAL MEDICINE

## 2023-01-22 PROCEDURE — 71046 X-RAY EXAM CHEST 2 VIEWS: CPT

## 2023-01-22 PROCEDURE — 65270000029 HC RM PRIVATE

## 2023-01-22 PROCEDURE — 74011000258 HC RX REV CODE- 258: Performed by: INTERNAL MEDICINE

## 2023-01-22 PROCEDURE — 36415 COLL VENOUS BLD VENIPUNCTURE: CPT

## 2023-01-22 RX ORDER — POTASSIUM CHLORIDE 750 MG/1
40 TABLET, FILM COATED, EXTENDED RELEASE ORAL
Status: COMPLETED | OUTPATIENT
Start: 2023-01-22 | End: 2023-01-22

## 2023-01-22 RX ADMIN — BENZONATATE 100 MG: 100 CAPSULE ORAL at 16:37

## 2023-01-22 RX ADMIN — Medication 10 ML: at 05:43

## 2023-01-22 RX ADMIN — INSULIN LISPRO 3 UNITS: 100 INJECTION, SOLUTION INTRAVENOUS; SUBCUTANEOUS at 22:04

## 2023-01-22 RX ADMIN — METRONIDAZOLE 500 MG: 500 INJECTION, SOLUTION INTRAVENOUS at 22:05

## 2023-01-22 RX ADMIN — INSULIN LISPRO 5 UNITS: 100 INJECTION, SOLUTION INTRAVENOUS; SUBCUTANEOUS at 08:31

## 2023-01-22 RX ADMIN — VANCOMYCIN HYDROCHLORIDE 1250 MG: 1.25 INJECTION, POWDER, LYOPHILIZED, FOR SOLUTION INTRAVENOUS at 01:11

## 2023-01-22 RX ADMIN — INSULIN HUMAN 20 UNITS: 100 INJECTION, SUSPENSION SUBCUTANEOUS at 16:37

## 2023-01-22 RX ADMIN — VANCOMYCIN HYDROCHLORIDE 1250 MG: 1.25 INJECTION, POWDER, LYOPHILIZED, FOR SOLUTION INTRAVENOUS at 23:17

## 2023-01-22 RX ADMIN — METRONIDAZOLE 500 MG: 500 INJECTION, SOLUTION INTRAVENOUS at 10:34

## 2023-01-22 RX ADMIN — GUAIFENESIN 1200 MG: 600 TABLET ORAL at 08:32

## 2023-01-22 RX ADMIN — CEFEPIME 2 G: 20 INJECTION, POWDER, FOR SOLUTION INTRAVENOUS at 11:49

## 2023-01-22 RX ADMIN — CEFEPIME 2 G: 20 INJECTION, POWDER, FOR SOLUTION INTRAVENOUS at 03:15

## 2023-01-22 RX ADMIN — VALSARTAN: 80 TABLET ORAL at 08:31

## 2023-01-22 RX ADMIN — Medication 10 ML: at 22:15

## 2023-01-22 RX ADMIN — VANCOMYCIN HYDROCHLORIDE 1250 MG: 1.25 INJECTION, POWDER, LYOPHILIZED, FOR SOLUTION INTRAVENOUS at 12:06

## 2023-01-22 RX ADMIN — BENZONATATE 100 MG: 100 CAPSULE ORAL at 22:04

## 2023-01-22 RX ADMIN — INSULIN LISPRO 5 UNITS: 100 INJECTION, SOLUTION INTRAVENOUS; SUBCUTANEOUS at 11:49

## 2023-01-22 RX ADMIN — BENZONATATE 100 MG: 100 CAPSULE ORAL at 08:32

## 2023-01-22 RX ADMIN — POTASSIUM CHLORIDE 40 MEQ: 750 TABLET, FILM COATED, EXTENDED RELEASE ORAL at 10:34

## 2023-01-22 RX ADMIN — GUAIFENESIN 1200 MG: 600 TABLET ORAL at 22:04

## 2023-01-22 RX ADMIN — INSULIN HUMAN 20 UNITS: 100 INJECTION, SUSPENSION SUBCUTANEOUS at 08:30

## 2023-01-22 RX ADMIN — SODIUM CHLORIDE, POTASSIUM CHLORIDE, SODIUM LACTATE AND CALCIUM CHLORIDE 75 ML/HR: 600; 310; 30; 20 INJECTION, SOLUTION INTRAVENOUS at 10:18

## 2023-01-22 RX ADMIN — INSULIN LISPRO 5 UNITS: 100 INJECTION, SOLUTION INTRAVENOUS; SUBCUTANEOUS at 16:37

## 2023-01-22 RX ADMIN — ENOXAPARIN SODIUM 40 MG: 100 INJECTION SUBCUTANEOUS at 08:30

## 2023-01-22 RX ADMIN — Medication 10 ML: at 14:00

## 2023-01-22 NOTE — PROGRESS NOTES
Bedside shift change report given to [address] [place] [address] (oncoming nurse) by Blake Bishop LPN (offgoing nurse). Report included the following information SBAR, Kardex, Intake/Output, MAR, Recent Results, and Med Rec Status.

## 2023-01-22 NOTE — PROGRESS NOTES
RRT evaluation:pt is resting at her baseline, chest output is 500ml. Pt said that my breathing is much better. No CP, nor distress at this time. Keep current POC. MEWS 3  Sepsis Score 4  Deterioration Index 43    Spoke with primary RN regarding pt status. Pt resting comfortably in bed with no s/s of distress. Denies CP or SOB. No further interventions at this time.      Christoph Mclean, RN

## 2023-01-22 NOTE — PROGRESS NOTES
[facility]UL[facility]OCI[facility]     Name: Cristino Zhao MRN: [id number]   : 1960 Hospital: [address]   Date: 2023        Impression Plan   Acute respiratory failure  Hypoxia  Loculated pleural effusion- suspected empyema  Pleuritic chest pain               S/P chest tube for draining and analyzes of fluid 23. S/P TPA and dornase 23. F/U pleural fluid labs  Wean O2 to keep sats above 90% - currently on RA  Broad abx- vanc/cefepime/flagyl  Will need follow up CT chest in 4-6 weeks  Follow up blood cultures  OOB into chair       Radiology  ( personally reviewed) CT chest reviewed: small right sided pleural effusion with lateral loculations. No inparenchymal infiltrates. ABG No results for input(s): PHI, PO2I, PCO2I in the last 72 hours. Subjective     Cc: back pain    57 yo with PMHx of HTN presenting with increasing pleuritic chest pain and scapular back pain on the right. States she had a URI over a week ago that initially improved, however then developed low grade fevers and scapular back pain. CT chest revealed a small pleural effusion on the right with loculations. Pt denies any previous lung problems and is a lifelong non-smoker. Currently on 3 liters O2 with sats of 96%    23 : Sitting up in bed. Overall feeling a little better today. Minimal out from pleural drain. We discussed TPA/Dornase. Have d/w pharmacy and will plan to administer today when available. Evita Bello:; pt seen, the tube drainage post first dose of tpa/dornase this am is now very bloody, tube flushes ok, I decided not to repeat infustion for now to avoid hemothorax, will get repeat ct tonight to assess how much residual fluid there is, suction increased on tube. 23 : Approx 700ml bloody drainage over last 24hours. CT w/ small residual effusion. Some tightness with deep breath, improving. No sig cough. No dyspnea. Remains on RA. D/W Dr. Larissa Prakash.  Will continue pleural drain and hold on TPA/Dornase. Review of Systems:  A comprehensive review of systems was negative except for that written in the HPI. Past Medical History:   Diagnosis Date    Hypertension     Type 2 diabetes mellitus ([address]       Past Surgical History:   Procedure Laterality Date    HX CHOLECYSTECTOMY      HX ERCP      HX LAP CHOLECYSTECTOMY  08/11/2019      Prior to Admission medications    Medication Sig Start Date End Date Taking? Authorizing Provider   ascorbic acid, vitamin C, (Vitamin C) 500 mg tablet Take 1,000 mg by mouth daily. Yes Provider, Historical   cholecalciferol (Vitamin D3) 25 mcg (1,000 unit) cap Take 1,000 Units by mouth daily. Yes Provider, Historical   OneTouch Delica Plus Lancet 33 gauge misc use 1 LANCET to TEST BLOOD SUGAR DAILY 12/13/22  Yes Aram Rasmussen MD   valsartan-hydroCHLOROthiazide (DIOVAN-HCT) 160-12.5 mg per tablet take 1 tablet by mouth once daily 10/11/22  Yes Aram Rasmussen MD   metFORMIN (GLUCOPHAGE) 500 mg tablet take 1 tablet by mouth twice a day with meals 10/11/22   Aram Rasmussen MD   OneTouch Ultra Test strip use 1 TEST STRIP to TEST BLOOD SUGAR once daily 6/14/22   Aram Rasmussen MD   lancets misc Check fasting blood sugars daily.  8/22/19   MD Christine Souza monitoring kit use as directed 8/22/19   Aram Rasmussen MD     Current Facility-Administered Medications   Medication Dose Route Frequency    insulin NPH (NOVOLIN N, HUMULIN N) injection 20 Units  20 Units SubCUTAneous ACB&D    alteplase 10 mg in ns intraPLEUral soln   IntraPLEUral BID    dornase david 5 mg in sterile water intraPLEUral soln   IntraPLEUral BID    Vancomycin Level 1/22 11 am   Other ONCE    metroNIDAZOLE (FLAGYL) IVPB premix 500 mg  500 mg IntraVENous Q12H    vancomycin (VANCOCIN) 1,250 mg in 0.9% sodium chloride 250 mL ([id number])  1,250 mg IntraVENous Q12H    valsartan/hydroCHLOROthiazide (DIOVAN HCT) 160/12.5 mg   Oral DAILY    sodium chloride (NS) flush 5-40 mL  5-40 mL IntraVENous Q8H    enoxaparin (LOVENOX) injection 40 mg  40 mg SubCUTAneous DAILY    cefepime (MAXIPIME) 2 g in 0.9% sodium chloride (MBP/ADV) 100 mL MBP  2 g IntraVENous Q8H    lactated Ringers infusion  75 mL/hr IntraVENous CONTINUOUS    insulin lispro (HUMALOG) injection   SubCUTAneous QID WITH MEALS    benzonatate (TESSALON) capsule 100 mg  100 mg Oral TID    guaiFENesin ER (MUCINEX) tablet 1,200 mg  1,200 mg Oral BID     No Known Allergies   Social History     Tobacco Use    Smoking status: Never    Smokeless tobacco: Never   Substance Use Topics    Alcohol use: Yes     Alcohol/week: 1.0 standard drink     Types: 1 Glasses of wine per week     Comment: 1-3 drinks per month      Family History   Problem Relation Age of Onset    Diabetes Mother         type 2    Hypertension Mother     Diabetes Sister     Congenital heart defect Maternal Grandmother           Laboratory: I have personally reviewed the critical care flowsheet and labs. Recent Labs     01/22/23  0113 01/20/23  0403 01/19/23  1740   WBC 13.5* 11.5* 12.6*   HGB 12.1 11.3* 14.5   HCT 36.3 34.1* 42.4    221 296       Recent Labs     01/22/23  0113 01/20/23  2133 01/20/23  0403 01/19/23  1740   *  --  135* 128*   K 3.4*  --  4.2 2.9*   CL 97  --  100 88*   CO2 29  --  27 29   *  --  414* 395*   BUN 8  --  12 13   CREA 0.59 0.66 0.71 0.90   CA 8.6  --  8.3* 9.3   MG  --   --   --  1.7   ALB  --   --   --  3.0*   ALT  --   --   --  28         Objective:     Mode Rate Tidal Volume Pressure FiO2 PEEP                    Vital Signs:     TMAX(24)      Intake/Output:   Last shift:         Last 3 shifts: No intake/output data recorded. RRIOLAST3  Intake/Output Summary (Last 24 hours) at 1/22/2023 0850  Last data filed at 1/22/2023 0734  Gross per 24 hour   Intake 2657 ml   Output 1154 ml   Net 1503 ml       EXAM:   GENERAL: well developed, awake, alert, HEENT:  PERRL, EOMI, no alar flaring or epistaxis, oral mucosa moist without cyanosis, NECK:  no jugular vein distention, no retractions, no thyromegaly or masses, LUNGS: crackles right base. Now able to take deeper breath without as much pain.  HEART:  Regular rate and rhythm with no MGR; no edema is present, ABDOMEN:  soft with no tenderness, bowel sounds present, EXTREMITIES:  warm with no cyanosis, SKIN:  no jaundice or ecchymosis, and NEUROLOGIC:  alert and oriented, grossly non-focal    [place], NP  [facility]

## 2023-01-22 NOTE — PROGRESS NOTES
1/22/2023  12:14 PM  Case management note    Reason for Admission:  pleuritic chest pain    Patient came to hospital for pleuritic chest pain. Patient has URI with fevers. Patient has history of HTN and DM. Patient is , drives and independent with ADL's.    [facility] [address] [address]                       RUR Score:        4%             Plan for utilizing home health:      PT/OT to eval    PCP: First and Last name:  Clare Tsai MD     Name of Practice:    Are you a current patient: Yes/No: yes   Approximate date of last visit: 1 year   Can you participate in a virtual visit with your PCP:                     Current Advanced Directive/Advance Care Plan: Full Code NO AD      Healthcare Decision Maker:   Andrés Meier spouse                              Transition of Care Plan:                     Home with family assistance  PCP follow up  AD planning  CM to follow for discharge needs    Care Management Interventions  PCP Verified by CM: (P) Yes  Support Systems: (P) Spouse/Significant Other  Confirm Follow Up Transport: (P) Family  Discharge [facility]  [facility]

## 2023-01-22 NOTE — PROGRESS NOTES
Marcelo [facility]  [address] [facility], [place], [address] [place] [address]  [phone]      Hospitalist Progress Note      NAME: Nelson Calhoun   :  1960  MRM:  [id number]    Date of service: 2023  6:50 AM       Assessment and Plan:   Loculated R pleural effusion: Chest CTA - no PE, small right loculated pleural effusion. S/p chest tube . On TPA and dornase. Check fluid analysis. On IV Vancomycin, cefepime and flagyl. Appreciated pulmonary evaluation     2. DM 2:  ISS w/ BG checks. Added NPH. Hold home metformin. Check A1C     4. HTN: Cont w/ Diovan-HCTZ 160/12.5mg daily         Subjective:     Chief Complaint[de-identified] Patient was seen and examined as a follow up for pleural effusion. Chart was reviewed. Chest pain better after chest tube placement and fells well. ROS:  (bold if positive, if negative)    Tolerating PT  Tolerating Diet        Objective:     Last 24hrs VS reviewed since prior progress note.  Most recent are:    Visit Vitals  BP (!) 150/77 (BP 1 Location: Right upper arm, BP Patient Position: At rest)   Pulse (!) 109   Temp 98.1 °F (36.7 °C)   Resp 18   Ht 5' 2\" (1.575 m)   Wt 88.5 kg (195 lb)   SpO2 91%   BMI 35.67 kg/m²     SpO2 Readings from Last 6 Encounters:   23 91%   21 98%   20 97%   19 98%   10/07/19 98%   09/15/19 96%    O2 Flow Rate (L/min): 1 l/min     Intake/Output Summary (Last 24 hours) at 2023 0959  Last data filed at 2023 0734  Gross per 24 hour   Intake 2657 ml   Output 1154 ml   Net 1503 ml          Physical Exam:    Gen:  Well-developed, well-nourished, in no acute distress  HEENT:  Pink conjunctivae, PERRL, hearing intact to voice, moist mucous membranes  Neck:  Supple, without masses, thyroid non-tender  Resp:  No accessory muscle use, rales on the RT  Card:  No murmurs, normal S1, S2 without thrills, bruits or peripheral edema  Abd:  Soft, non-tender, non-distended, normoactive bowel sounds are present, no palpable organomegaly and no detectable hernias  Lymph:  No cervical or inguinal adenopathy  Musc:  No cyanosis or clubbing  Skin:  No rashes or ulcers, skin turgor is good  Neuro:  Cranial nerves are grossly intact, no focal motor weakness, follows commands appropriately  Psych:  Good insight, oriented to person, place and time, alert  __________________________________________________________________  Medications Reviewed: (see below)  Medications:     Current Facility-Administered Medications   Medication Dose Route Frequency    potassium chloride SR (KLOR-CON 10) tablet 40 mEq  40 mEq Oral NOW    insulin NPH (NOVOLIN N, HUMULIN N) injection 20 Units  20 Units SubCUTAneous ACB&D    Vancomycin Level 1/22 11 am   Other ONCE    metroNIDAZOLE (FLAGYL) IVPB premix 500 mg  500 mg IntraVENous Q12H    vancomycin (VANCOCIN) 1,250 mg in 0.9% sodium chloride 250 mL ([id number])  1,250 mg IntraVENous Q12H    valsartan/hydroCHLOROthiazide (DIOVAN HCT) 160/12.5 mg   Oral DAILY    sodium chloride (NS) flush 5-40 mL  5-40 mL IntraVENous Q8H    sodium chloride (NS) flush 5-40 mL  5-40 mL IntraVENous PRN    acetaminophen (TYLENOL) tablet 650 mg  650 mg Oral Q6H PRN    Or    acetaminophen (TYLENOL) suppository 650 mg  650 mg Rectal Q6H PRN    polyethylene glycol (MIRALAX) packet 17 g  17 g Oral DAILY PRN    ondansetron (ZOFRAN ODT) tablet 4 mg  4 mg Oral Q8H PRN    Or    ondansetron (ZOFRAN) injection 4 mg  4 mg IntraVENous Q6H PRN    enoxaparin (LOVENOX) injection 40 mg  40 mg SubCUTAneous DAILY    cefepime (MAXIPIME) 2 g in 0.9% sodium chloride (MBP/ADV) 100 mL MBP  2 g IntraVENous Q8H    lactated Ringers infusion  75 mL/hr IntraVENous CONTINUOUS    insulin lispro (HUMALOG) injection   SubCUTAneous QID WITH MEALS    glucose chewable tablet 16 g  4 Tablet Oral PRN    glucagon (GLUCAGEN) injection 1 mg  1 mg IntraMUSCular PRN    dextrose 10% infusion 0-250 mL  0-250 mL IntraVENous PRN    benzonatate (TESSALON) capsule 100 mg  100 mg Oral TID guaiFENesin ER (MUCINEX) tablet 1,200 mg  1,200 mg Oral BID        Lab Data Reviewed: (see below)  Lab Review:     Recent Labs     01/22/23  0113 01/20/23  0403 01/19/23  1740   WBC 13.5* 11.5* 12.6*   HGB 12.1 11.3* 14.5   HCT 36.3 34.1* 42.4    221 296       Recent Labs     01/22/23  0113 01/20/23  2133 01/20/23  0403 01/19/23  1740   *  --  135* 128*   K 3.4*  --  4.2 2.9*   CL 97  --  100 88*   CO2 29  --  27 29   *  --  414* 395*   BUN 8  --  12 13   CREA 0.59 0.66 0.71 0.90   CA 8.6  --  8.3* 9.3   MG  --   --   --  1.7   ALB  --   --   --  3.0*   TBILI  --   --   --  1.0   ALT  --   --   --  28       Lab Results   Component Value Date/Time    Glucose (POC) 295 (H) 01/22/2023 07:42 AM    Glucose (POC) 318 (H) 01/21/2023 10:15 PM    Glucose (POC) 289 (H) 01/21/2023 04:28 PM    Glucose (POC) 318 (H) 01/21/2023 11:24 AM    Glucose (POC) 340 (H) 01/21/2023 07:18 AM     No results for input(s): PH, PCO2, PO2, HCO3, FIO2 in the last 72 hours. No results for input(s): INR, INREXT, INREXT in the last 72 hours. All Micro Results       Procedure Component Value Units Date/Time    CULTURE, BLOOD, PAIRED [[id number]] Collected: 01/19/23 1900    Order Status: Completed Specimen: Blood Updated: 01/22/23 4094     Special Requests: NO SPECIAL REQUESTS        Culture result: NO GROWTH 3 DAYS       CULTURE, MRSA [[id number]] Collected: 01/20/23 1424    Order Status: Completed Specimen: Nasal from Nares Updated: 01/21/23 2312     Special Requests: NO SPECIAL REQUESTS        Culture result: MRSA NOT PRESENT               Screening of patient nares for MRSA is for surveillance purposes and, if positive, to facilitate isolation considerations in high risk settings. It is not intended for automatic decolonization interventions per se as regimens are not sufficiently effective to warrant routine use. CULTURE, BODY FLUID [[id number]] Collected: 01/20/23 1051    Order Status: Completed Specimen:  Body Fluid from Pleural Fluid Updated: 01/21/23 1154     Special Requests: NO SPECIAL REQUESTS        GRAM STAIN FEW WBCS SEEN         NO ORGANISMS SEEN        Culture result:       NO GROWTH THUS FAR Culture performed on Fluid swab specimen          CULTURE, FUNGUS [[id number]] Collected: 01/20/23 1051    Order Status: Sent Specimen: Pleural Fluid Updated: 01/20/23 1557    AFB CULTURE + SMEAR W/RFLX ID FROM CULTURE [[id number]]     Order Status: Sent     COVID-19 RAPID TEST [[id number]] Collected: 01/19/23 1740    Order Status: Completed Specimen: Nasopharyngeal Updated: 01/19/23 1811     Specimen source Nasopharyngeal        COVID-19 rapid test Not detected        Comment: Rapid Abbott ID Now       Rapid NAAT:  The specimen is NEGATIVE for SARS-CoV-2, the novel coronavirus associated with COVID-19. Negative results should be treated as presumptive and, if inconsistent with clinical signs and symptoms or necessary for patient management, should be tested with an alternative molecular assay. Negative results do not preclude SARS-CoV-2 infection and should not be used as the sole basis for patient management decisions. This test has been authorized by the FDA under an Emergency Use Authorization (EUA) for use by authorized laboratories. Fact sheet for Healthcare Providers:  http://www.jesus-kyle.phuong/  Fact sheet for Patients: http://www.jesus-kyle.binanette/       Methodology: Isothermal Nucleic Acid Amplification                 I have reviewed notes of prior 24hr. Other pertinent lab: Total time spent with patient: 35 minutes I personally reviewed chart, notes, data and current medications in the medical record. I have personally examined and treated the patient at bedside during this period.                  Care Plan discussed with: Patient, Nursing Staff, and >50% of time spent in counseling and coordination of care    Discussed:  Care Plan    Prophylaxis:  Lovenox    Disposition: Home w/Family           ___________________________________________________    Attending Physician: Smita Flores MD

## 2023-01-22 NOTE — PROGRESS NOTES
[facility] Dosing Services: Antimicrobial Stewardship Daily Doc  Consult for antibiotic dosing of Vancomycin by Dr. Cheryl Hernandez  Indication: CAP;Empyema  Day of Therapy: 4       Vancomycin therapy:  Current maintenance dose: vancomycin 1250 mg IV every 12 hours   Dose calculated to approximate a           a. Target AUC/ISABELLA of 400 to 600          b. Trough of n/a mcg/mL      Plan: Vancomycin level obtained today at 11:55 am predicts and AUC of 347 based on the current regimen of 1250 mg every 12 hours. Increase vancomycin to 1250 mg IV every 8 hours which predicts a an AUC of 520. Plan for another level in about 24 hours.     Thank you,      Jake Nielson, PharmD, BCPS

## 2023-01-22 NOTE — PROGRESS NOTES
Bedside shift change report given to [facility] LPN (oncoming nurse) by Geraldine Matthews RN (offgoing nurse). Report included the following information SBAR, Recent Results, and Med Rec Status.

## 2023-01-23 ENCOUNTER — APPOINTMENT (OUTPATIENT)
Dept: GENERAL RADIOLOGY | Age: 63
DRG: 166 | End: 2023-01-23
Attending: NURSE PRACTITIONER
Payer: COMMERCIAL

## 2023-01-23 LAB
ANION GAP SERPL CALC-SCNC: 8 MMOL/L (ref 5–15)
BACTERIA SPEC CULT: NORMAL
BASOPHILS # BLD: 0 K/UL (ref 0–0.1)
BASOPHILS NFR BLD: 0 % (ref 0–1)
BUN SERPL-MCNC: 8 MG/DL (ref 6–20)
BUN/CREAT SERPL: 15 (ref 12–20)
CALCIUM SERPL-MCNC: 8.2 MG/DL (ref 8.5–10.1)
CHLORIDE SERPL-SCNC: 100 MMOL/L (ref 97–108)
CO2 SERPL-SCNC: 29 MMOL/L (ref 21–32)
CREAT SERPL-MCNC: 0.52 MG/DL (ref 0.55–1.02)
DIFFERENTIAL METHOD BLD: ABNORMAL
EOSINOPHIL # BLD: 0.1 K/UL (ref 0–0.4)
EOSINOPHIL NFR BLD: 2 % (ref 0–7)
ERYTHROCYTE [DISTWIDTH] IN BLOOD BY AUTOMATED COUNT: 13.3 % (ref 11.5–14.5)
GLUCOSE BLD STRIP.AUTO-MCNC: 227 MG/DL (ref 65–117)
GLUCOSE BLD STRIP.AUTO-MCNC: 237 MG/DL (ref 65–117)
GLUCOSE BLD STRIP.AUTO-MCNC: 266 MG/DL (ref 65–117)
GLUCOSE BLD STRIP.AUTO-MCNC: 314 MG/DL (ref 65–117)
GLUCOSE SERPL-MCNC: 249 MG/DL (ref 65–100)
HCT VFR BLD AUTO: 34.7 % (ref 35–47)
HGB BLD-MCNC: 11.4 G/DL (ref 11.5–16)
IMM GRANULOCYTES # BLD AUTO: 0.1 K/UL (ref 0–0.04)
IMM GRANULOCYTES NFR BLD AUTO: 1 % (ref 0–0.5)
LYMPHOCYTES # BLD: 1.3 K/UL (ref 0.8–3.5)
LYMPHOCYTES NFR BLD: 14 % (ref 12–49)
MCH RBC QN AUTO: 27.9 PG (ref 26–34)
MCHC RBC AUTO-ENTMCNC: 32.9 G/DL (ref 30–36.5)
MCV RBC AUTO: 84.8 FL (ref 80–99)
MONOCYTES # BLD: 0.8 K/UL (ref 0–1)
MONOCYTES NFR BLD: 9 % (ref 5–13)
NEUTS SEG # BLD: 6.8 K/UL (ref 1.8–8)
NEUTS SEG NFR BLD: 74 % (ref 32–75)
NRBC # BLD: 0 K/UL (ref 0–0.01)
NRBC BLD-RTO: 0 PER 100 WBC
PLATELET # BLD AUTO: 229 K/UL (ref 150–400)
PMV BLD AUTO: 10.6 FL (ref 8.9–12.9)
POTASSIUM SERPL-SCNC: 3.3 MMOL/L (ref 3.5–5.1)
RBC # BLD AUTO: 4.09 M/UL (ref 3.8–5.2)
SERVICE CMNT-IMP: ABNORMAL
SERVICE CMNT-IMP: NORMAL
SODIUM SERPL-SCNC: 137 MMOL/L (ref 136–145)
WBC # BLD AUTO: 9.2 K/UL (ref 3.6–11)

## 2023-01-23 PROCEDURE — 82962 GLUCOSE BLOOD TEST: CPT

## 2023-01-23 PROCEDURE — 80048 BASIC METABOLIC PNL TOTAL CA: CPT

## 2023-01-23 PROCEDURE — 36415 COLL VENOUS BLD VENIPUNCTURE: CPT

## 2023-01-23 PROCEDURE — 74011250636 HC RX REV CODE- 250/636: Performed by: INTERNAL MEDICINE

## 2023-01-23 PROCEDURE — 74011636637 HC RX REV CODE- 636/637: Performed by: INTERNAL MEDICINE

## 2023-01-23 PROCEDURE — 85025 COMPLETE CBC W/AUTO DIFF WBC: CPT

## 2023-01-23 PROCEDURE — 74011000250 HC RX REV CODE- 250: Performed by: INTERNAL MEDICINE

## 2023-01-23 PROCEDURE — 65270000029 HC RM PRIVATE

## 2023-01-23 PROCEDURE — 74011250637 HC RX REV CODE- 250/637: Performed by: INTERNAL MEDICINE

## 2023-01-23 PROCEDURE — 74011000258 HC RX REV CODE- 258: Performed by: INTERNAL MEDICINE

## 2023-01-23 PROCEDURE — 71046 X-RAY EXAM CHEST 2 VIEWS: CPT

## 2023-01-23 RX ADMIN — BENZONATATE 100 MG: 100 CAPSULE ORAL at 16:53

## 2023-01-23 RX ADMIN — INSULIN LISPRO 3 UNITS: 100 INJECTION, SOLUTION INTRAVENOUS; SUBCUTANEOUS at 08:28

## 2023-01-23 RX ADMIN — CEFEPIME 2 G: 20 INJECTION, POWDER, FOR SOLUTION INTRAVENOUS at 01:38

## 2023-01-23 RX ADMIN — BENZONATATE 100 MG: 100 CAPSULE ORAL at 08:28

## 2023-01-23 RX ADMIN — GUAIFENESIN 1200 MG: 600 TABLET ORAL at 20:34

## 2023-01-23 RX ADMIN — ENOXAPARIN SODIUM 40 MG: 100 INJECTION SUBCUTANEOUS at 08:28

## 2023-01-23 RX ADMIN — GUAIFENESIN 1200 MG: 600 TABLET ORAL at 08:27

## 2023-01-23 RX ADMIN — INSULIN LISPRO 7 UNITS: 100 INJECTION, SOLUTION INTRAVENOUS; SUBCUTANEOUS at 11:52

## 2023-01-23 RX ADMIN — INSULIN LISPRO 3 UNITS: 100 INJECTION, SOLUTION INTRAVENOUS; SUBCUTANEOUS at 16:54

## 2023-01-23 RX ADMIN — CEFEPIME 2 G: 20 INJECTION, POWDER, FOR SOLUTION INTRAVENOUS at 04:00

## 2023-01-23 RX ADMIN — INSULIN HUMAN 20 UNITS: 100 INJECTION, SUSPENSION SUBCUTANEOUS at 16:54

## 2023-01-23 RX ADMIN — METRONIDAZOLE 500 MG: 500 INJECTION, SOLUTION INTRAVENOUS at 21:08

## 2023-01-23 RX ADMIN — Medication 10 ML: at 05:30

## 2023-01-23 RX ADMIN — Medication 10 ML: at 21:13

## 2023-01-23 RX ADMIN — INSULIN HUMAN 20 UNITS: 100 INJECTION, SUSPENSION SUBCUTANEOUS at 08:28

## 2023-01-23 RX ADMIN — VANCOMYCIN HYDROCHLORIDE 1250 MG: 1.25 INJECTION, POWDER, LYOPHILIZED, FOR SOLUTION INTRAVENOUS at 06:55

## 2023-01-23 RX ADMIN — BENZONATATE 100 MG: 100 CAPSULE ORAL at 21:08

## 2023-01-23 RX ADMIN — INSULIN LISPRO 3 UNITS: 100 INJECTION, SOLUTION INTRAVENOUS; SUBCUTANEOUS at 22:13

## 2023-01-23 RX ADMIN — CEFEPIME 2 G: 20 INJECTION, POWDER, FOR SOLUTION INTRAVENOUS at 20:34

## 2023-01-23 RX ADMIN — SODIUM CHLORIDE, POTASSIUM CHLORIDE, SODIUM LACTATE AND CALCIUM CHLORIDE 75 ML/HR: 600; 310; 30; 20 INJECTION, SOLUTION INTRAVENOUS at 11:50

## 2023-01-23 RX ADMIN — METRONIDAZOLE 500 MG: 500 INJECTION, SOLUTION INTRAVENOUS at 09:50

## 2023-01-23 RX ADMIN — Medication 10 ML: at 14:30

## 2023-01-23 RX ADMIN — VALSARTAN: 80 TABLET ORAL at 08:27

## 2023-01-23 RX ADMIN — CEFEPIME 2 G: 20 INJECTION, POWDER, FOR SOLUTION INTRAVENOUS at 11:52

## 2023-01-23 NOTE — PROGRESS NOTES
[facility]     Name: Hailee Henderson MRN: [id number]   : 1960 Hospital: [address]   Date: 2023        Impression Plan   Acute respiratory failure  Hypoxia  Loculated pleural effusion- suspected empyema  Pleuritic chest pain               S/P chest tube for draining and analyzes of fluid 23. S/P TPA and dornase 23. F/U pleural fluid labs  Wean O2 to keep sats above 90% - currently on RA  Broad abx- cefepime/flagyl  CT out when output <200/24 hours  Will need follow up CT chest in 4-6 weeks  Follow up blood cultures  OOB into chair       Radiology  ( personally reviewed) CT chest reviewed: small right sided pleural effusion with lateral loculations. No inparenchymal infiltrates. ABG No results for input(s): PHI, PO2I, PCO2I in the last 72 hours. Subjective     Cc: back pain    57 yo with PMHx of HTN presenting with increasing pleuritic chest pain and scapular back pain on the right. States she had a URI over a week ago that initially improved, however then developed low grade fevers and scapular back pain. CT chest revealed a small pleural effusion on the right with loculations. Pt denies any previous lung problems and is a lifelong non-smoker. Currently on 3 liters O2 with sats of 96%    23 : Sitting up in bed. Overall feeling a little better today. Minimal out from pleural drain. We discussed TPA/Dornase. Have d/w pharmacy and will plan to administer today when available. Rakesh Alston:; pt seen, the tube drainage post first dose of tpa/dornase this am is now very bloody, tube flushes ok, I decided not to repeat infustion for now to avoid hemothorax, will get repeat ct tonight to assess how much residual fluid there is, suction increased on tube. 23 : Approx 700ml bloody drainage over last 24hours. CT w/ small residual effusion. Some tightness with deep breath, improving. No sig cough. No dyspnea. Remains on RA. D/W Dr. Brito Founds.  Will continue pleural drain and hold on TPA/Dornase. Review of Systems:  A comprehensive review of systems was negative except for that written in the HPI. Interval History:    Afebrile  BP stable  HR 90-100s  Sats 93% on RA  K 3.3  210mL out of chest tube in last 24 hours  Pleural fluid culture with scant negative rods  Blood cultures no growth x 4 days    ROS: Feeling much better. Has some residual chest pain at CT insertion side but remarkably improved. Past Medical History:   Diagnosis Date    Hypertension     Type 2 diabetes mellitus ([facility] [address]       Past Surgical History:   Procedure Laterality Date    HX CHOLECYSTECTOMY      HX ERCP      HX LAP CHOLECYSTECTOMY  08/11/2019      Prior to Admission medications    Medication Sig Start Date End Date Taking? Authorizing Provider   ascorbic acid, vitamin C, (Vitamin C) 500 mg tablet Take 1,000 mg by mouth daily. Yes Provider, Historical   cholecalciferol (Vitamin D3) 25 mcg (1,000 unit) cap Take 1,000 Units by mouth daily. Yes Provider, Historical   OneTouch Delica Plus Lancet 33 gauge misc use 1 LANCET to TEST BLOOD SUGAR DAILY 12/13/22  Yes Svetlana Hurley MD   valsartan-hydroCHLOROthiazide (DIOVAN-HCT) 160-12.5 mg per tablet take 1 tablet by mouth once daily 10/11/22  Yes Svetlana Hurley MD   metFORMIN (GLUCOPHAGE) 500 mg tablet take 1 tablet by mouth twice a day with meals 10/11/22   Svetlana Hurley MD   OneTouch Ultra Test strip use 1 TEST STRIP to TEST BLOOD SUGAR once daily 6/14/22   Svetlana Hurley MD   lancets misc Check fasting blood sugars daily.  8/22/19   Svetlana Hurley MD   [facility] use as directed 8/22/19   Svetlana Hurley MD     Current Facility-Administered Medications   Medication Dose Route Frequency    vancomycin (VANCOCIN) 1,250 mg in 0.9% sodium chloride 250 mL ([id number])  1,250 mg IntraVENous Q8H    insulin NPH (NOVOLIN N, HUMULIN N) injection 20 Units  20 Units SubCUTAneous ACB&D    metroNIDAZOLE (FLAGYL) IVPB premix 500 mg  500 mg IntraVENous Q12H    valsartan/hydroCHLOROthiazide (DIOVAN HCT) 160/12.5 mg   Oral DAILY    sodium chloride (NS) flush 5-40 mL  5-40 mL IntraVENous Q8H    enoxaparin (LOVENOX) injection 40 mg  40 mg SubCUTAneous DAILY    cefepime (MAXIPIME) 2 g in 0.9% sodium chloride (MBP/ADV) 100 mL MBP  2 g IntraVENous Q8H    lactated Ringers infusion  75 mL/hr IntraVENous CONTINUOUS    insulin lispro (HUMALOG) injection   SubCUTAneous QID WITH MEALS    benzonatate (TESSALON) capsule 100 mg  100 mg Oral TID    guaiFENesin ER (MUCINEX) tablet 1,200 mg  1,200 mg Oral BID     No Known Allergies   Social History     Tobacco Use    Smoking status: Never    Smokeless tobacco: Never   Substance Use Topics    Alcohol use: Yes     Alcohol/week: 1.0 standard drink     Types: 1 Glasses of wine per week     Comment: 1-3 drinks per month      Family History   Problem Relation Age of Onset    Diabetes Mother         type 2    Hypertension Mother     Diabetes Sister     Congenital heart defect Maternal Grandmother           Laboratory: I have personally reviewed the critical care flowsheet and labs. Recent Labs     01/23/23 0127 01/22/23 0113   WBC 9.2 13.5*   HGB 11.4* 12.1   HCT 34.7* 36.3    227       Recent Labs     01/23/23 0127 01/22/23  0113 01/20/23  2133    134*  --    K 3.3* 3.4*  --     97  --    CO2 29 29  --    * 321*  --    BUN 8 8  --    CREA 0.52* 0.59 0.66   CA 8.2* 8.6  --          Objective:     Mode Rate Tidal Volume Pressure FiO2 PEEP                    Vital Signs:     TMAX(24)      Intake/Output:   Last shift:         Last 3 shifts: No intake/output data recorded. RRIOLAST3  Intake/Output Summary (Last 24 hours) at 1/23/2023 1111  Last data filed at 1/23/2023 0544  Gross per 24 hour   Intake 2253.83 ml   Output 210 ml   Net 2043.83 ml       EXAM:   GENERAL: well developed, awake, alert, HEENT:  PERRL, EOMI, no alar flaring or epistaxis, oral mucosa moist without cyanosis, NECK:  no jugular vein distention, no retractions, no thyromegaly or masses, LUNGS: crackles right base.  HEART:  Regular rate and rhythm with no MGR; no edema is present, ABDOMEN:  soft with no tenderness, bowel sounds present, EXTREMITIES:  warm with no cyanosis, SKIN:  no jaundice or ecchymosis, and NEUROLOGIC:  alert and oriented, grossly non-focal    Ronal Spies, NP  [facility]

## 2023-01-23 NOTE — PROGRESS NOTES
1/23/2023   CARE MANAGEMENT NOTE:  CM reviewed EMR and handoff received from weekend  Lamar SAUCEDO. Pt was admitted with loculated right plerual effusion and s/p chest tube; also DM, HTN. Reportedly, pt resides with her  Swetha Rehman ([phone]). RUR 4%    Transition Plan of Care:  Pulmonary following for medical management  Plan is for pt to return home with her   Outpatient follow up   will transport pt home     CM will continue to follow pt until discharged.   Adarsh

## 2023-01-23 NOTE — PROGRESS NOTES
Bedside shift change report given to KAREN Puga (oncoming nurse) by Christiano Gee LPN (offgoing nurse). Report included the following information SBAR, Kardex, Intake/Output, MAR, Recent Results, and Med Rec Status.

## 2023-01-23 NOTE — PROGRESS NOTES
[facility]  [address] [facility], [place], [address] [place] [address]  [phone]      Hospitalist Progress Note      NAME: Hailee Henderson   :  1960  MRM:  [id number]    Date of service: 2023  6:50 AM    Patient is a pleasant 58 YOF admitted for a loculated pleural effusion. Assessment and Plan:   Loculated R pleural effusion: Chest CTA - no PE, small right loculated pleural effusion. S/p chest tube . Was on TPA and dornase, now held due to hematogenous output. Pending fluid analysis. On IV cefepime and flagyl. Vanc off - GNR on Cx. Appreciated pulmonary evaluation     2. DM 2:  ISS w/ BG checks. Added NPH. Hold home metformin. Check A1C     4. HTN: Cont w/ Diovan-HCTZ 160/12.5mg daily         Subjective:     Chief Complaint[de-identified] Patient was seen and examined as a follow up for pleural effusion. Chart was reviewed. Chest pain better after chest tube placement and fells well. ROS:  (bold if positive, if negative)    Tolerating PT  Tolerating Diet        Objective:     Last 24hrs VS reviewed since prior progress note.  Most recent are:    Visit Vitals  /75 (BP 1 Location: Right upper arm, BP Patient Position: At rest;Semi fowlers)   Pulse 99   Temp 98.6 °F (37 °C)   Resp 16   Ht 5' 2\" (1.575 m)   Wt 88.5 kg (195 lb)   SpO2 93%   BMI 35.67 kg/m²     SpO2 Readings from Last 6 Encounters:   23 93%   21 98%   20 97%   19 98%   10/07/19 98%   09/15/19 96%    O2 Flow Rate (L/min): 1 l/min     Intake/Output Summary (Last 24 hours) at 2023 1632  Last data filed at 2023 1551  Gross per 24 hour   Intake 1460 ml   Output 210 ml   Net 1250 ml          Physical Exam:    Gen:  Well-developed, well-nourished, in no acute distress  HEENT:  Pink conjunctivae, PERRL, hearing intact to voice, moist mucous membranes  Neck:  Supple, without masses, thyroid non-tender  Resp:  No accessory muscle use, rales on the RT, CT with hemaogenous OP  Card:  No murmurs, normal S1, S2 without thrills, bruits or peripheral edema  Abd:  Soft, non-tender, non-distended, normoactive bowel sounds are present, no palpable organomegaly and no detectable hernias  Lymph:  No cervical or inguinal adenopathy  Musc:  No cyanosis or clubbing  Skin:  No rashes or ulcers, skin turgor is good  Neuro:  Cranial nerves are grossly intact, no focal motor weakness, follows commands appropriately  Psych:  Good insight, oriented to person, place and time, alert  __________________________________________________________________  Medications Reviewed: (see below)  Medications:     Current Facility-Administered Medications   Medication Dose Route Frequency    insulin NPH (NOVOLIN N, HUMULIN N) injection 20 Units  20 Units SubCUTAneous ACB&D    metroNIDAZOLE (FLAGYL) IVPB premix 500 mg  500 mg IntraVENous Q12H    valsartan/hydroCHLOROthiazide (DIOVAN HCT) 160/12.5 mg   Oral DAILY    sodium chloride (NS) flush 5-40 mL  5-40 mL IntraVENous Q8H    sodium chloride (NS) flush 5-40 mL  5-40 mL IntraVENous PRN    acetaminophen (TYLENOL) tablet 650 mg  650 mg Oral Q6H PRN    Or    acetaminophen (TYLENOL) suppository 650 mg  650 mg Rectal Q6H PRN    polyethylene glycol (MIRALAX) packet 17 g  17 g Oral DAILY PRN    ondansetron (ZOFRAN ODT) tablet 4 mg  4 mg Oral Q8H PRN    Or    ondansetron (ZOFRAN) injection 4 mg  4 mg IntraVENous Q6H PRN    enoxaparin (LOVENOX) injection 40 mg  40 mg SubCUTAneous DAILY    cefepime (MAXIPIME) 2 g in 0.9% sodium chloride (MBP/ADV) 100 mL MBP  2 g IntraVENous Q8H    lactated Ringers infusion  75 mL/hr IntraVENous CONTINUOUS    insulin lispro (HUMALOG) injection   SubCUTAneous QID WITH MEALS    glucose chewable tablet 16 g  4 Tablet Oral PRN    glucagon (GLUCAGEN) injection 1 mg  1 mg IntraMUSCular PRN    dextrose 10% infusion 0-250 mL  0-250 mL IntraVENous PRN    benzonatate (TESSALON) capsule 100 mg  100 mg Oral TID    guaiFENesin ER (MUCINEX) tablet 1,200 mg  1,200 mg Oral BID Lab Data Reviewed: (see below)  Lab Review:     Recent Labs     01/23/23  0127 01/22/23 0113   WBC 9.2 13.5*   HGB 11.4* 12.1   HCT 34.7* 36.3    227       Recent Labs     01/23/23  0127 01/22/23  0113 01/20/23  2133    134*  --    K 3.3* 3.4*  --     97  --    CO2 29 29  --    * 321*  --    BUN 8 8  --    CREA 0.52* 0.59 0.66   CA 8.2* 8.6  --        Lab Results   Component Value Date/Time    Glucose (POC) 237 (H) 01/23/2023 04:19 PM    Glucose (POC) 314 (H) 01/23/2023 10:59 AM    Glucose (POC) 227 (H) 01/23/2023 07:47 AM    Glucose (POC) 263 (H) 01/22/2023 09:57 PM    Glucose (POC) 271 (H) 01/22/2023 04:00 PM     No results for input(s): PH, PCO2, PO2, HCO3, FIO2 in the last 72 hours. No results for input(s): INR, INREXT, INREXT in the last 72 hours. All Micro Results       Procedure Component Value Units Date/Time    CULTURE, BODY FLUID [[id number]]  (Abnormal) Collected: 01/20/23 1051    Order Status: Completed Specimen:  Body Fluid from Pleural Fluid Updated: 01/23/23 0744     Special Requests: NO SPECIAL REQUESTS        GRAM STAIN FEW WBCS SEEN         NO ORGANISMS SEEN        Culture result:       SCANT Gram negative rods IDENTIFICATION AND SUSCEPTIBILITY TO FOLLOW          CULTURE, FUNGUS [[id number]] Collected: 01/20/23 1051    Order Status: Completed Specimen: Pleural Fluid Updated: 01/23/23 0732     Special Requests: NO SPECIAL REQUESTS        Culture result: NO FUNGUS ISOLATED 3 DAYS       CULTURE, BLOOD, PAIRED [[id number]] Collected: 01/19/23 1900    Order Status: Completed Specimen: Blood Updated: 01/23/23 0650     Special Requests: NO SPECIAL REQUESTS        Culture result: NO GROWTH 4 DAYS       CULTURE, MRSA [[id number]] Collected: 01/20/23 1424    Order Status: Completed Specimen: Nasal from Nares Updated: 01/21/23 2312     Special Requests: NO SPECIAL REQUESTS        Culture result: MRSA NOT PRESENT               Screening of patient nares for MRSA is for surveillance purposes and, if positive, to facilitate isolation considerations in high risk settings. It is not intended for automatic decolonization interventions per se as regimens are not sufficiently effective to warrant routine use. AFB CULTURE + SMEAR W/RFLX ID FROM CULTURE [[id number]] Collected: 01/20/23 0830    Order Status: Canceled     COVID-19 RAPID TEST [[id number]] Collected: 01/19/23 1740    Order Status: Completed Specimen: Nasopharyngeal Updated: 01/19/23 1811     Specimen source Nasopharyngeal        COVID-19 rapid test Not detected        Comment: Rapid Abbott ID Now       Rapid NAAT:  The specimen is NEGATIVE for SARS-CoV-2, the novel coronavirus associated with COVID-19. Negative results should be treated as presumptive and, if inconsistent with clinical signs and symptoms or necessary for patient management, should be tested with an alternative molecular assay. Negative results do not preclude SARS-CoV-2 infection and should not be used as the sole basis for patient management decisions. This test has been authorized by the FDA under an Emergency Use Authorization (EUA) for use by authorized laboratories. Fact sheet for Healthcare Providers:  http://www.jesus-kyle.biz/  Fact sheet for Patients: http://www.jesus-kyle.biz/       Methodology: Isothermal Nucleic Acid Amplification                 I have reviewed notes of prior 24hr. Other pertinent lab: Total time spent with patient: 35 minutes I personally reviewed chart, notes, data and current medications in the medical record. I have personally examined and treated the patient at bedside during this period.                  Care Plan discussed with: Patient, Nursing Staff, and >50% of time spent in counseling and coordination of care    Discussed:  Care Plan    Prophylaxis:  Lovenox    Disposition:  Home w/Family           ___________________________________________________    Attending Physician: Sahil Stewart MD

## 2023-01-23 NOTE — PROGRESS NOTES
Physician Progress Note      Cece Dang  CSN #:                  [id number]  :                       1960  ADMIT DATE:       2023 5:39 PM  [address] [place] DATE:  RESPONDING  PROVIDER #:        Raiza Torres MD          QUERY TEXT:    Pt admitted with ***. Pt noted to have ***. If possible, please document in the progress notes and discharge summary if you are evaluating and /or treating any of the following: The medical record reflects the following:  Risk Factors: loculated pleural effusion, acute resp failure, suspected empyema  Clinical Indicators: VS: 100.4, 134, 20, 145/97, 93% on RA, 12.6>>>11.5>>>13.5, small right loculated pleural effusion, Gram stain-SCANT Gram negative rods  Treatment: CXR, CTA, daily labs, IVF bolus, IV Cefepime, Gram stain, pulmonology consult    Thank you  Konrad Moscoso RN CDI  [phone]  Options provided:  -- *** (organism, if known) Sepsis, present on admission  -- *** (organism if known) Sepsis, present on admission now resolved  -- *** (organism if known) Sepsis, developed following admission  -- *** (localized infection such as pneumonia, UTI, or cellulitis) without Sepsis  -- Sepsis was ruled out  -- Other - I will add my own diagnosis  -- Disagree - Not applicable / Not valid  -- Disagree - Clinically unable to determine / Unknown  -- Refer to Clinical Documentation Reviewer    PROVIDER RESPONSE TEXT:    After further study, sepsis was ruled out for this patient.     Query created by: Kylah Chin on 2023 11:33 AM      Electronically signed by:  Raiza Torres MD 2023 4:43 PM

## 2023-01-23 NOTE — PROGRESS NOTES
Problem: Pain  Goal: *Control of Pain  Outcome: Progressing Towards Goal     Problem: Diabetes Self-Management  Goal: *Disease process and treatment process  Description: Define diabetes and identify own type of diabetes; list 3 options for treating diabetes. Outcome: Progressing Towards Goal  Goal: *Incorporating nutritional management into lifestyle  Description: Describe effect of type, amount and timing of food on blood glucose; list 3 methods for planning meals. Outcome: Progressing Towards Goal  Goal: *Incorporating physical activity into lifestyle  Description: State effect of exercise on blood glucose levels. Outcome: Progressing Towards Goal  Goal: *Developing strategies to promote health/change behavior  Description: Define the ABC's of diabetes; identify appropriate screenings, schedule and personal plan for screenings. Outcome: Progressing Towards Goal  Goal: *Using medications safely  Description: State effect of diabetes medications on diabetes; name diabetes medication taking, action and side effects. Outcome: Progressing Towards Goal  Goal: *Monitoring blood glucose, interpreting and using results  Description: Identify recommended blood glucose targets  and personal targets. Outcome: Progressing Towards Goal  Goal: *Prevention, detection, treatment of acute complications  Description: List symptoms of hyper- and hypoglycemia; describe how to treat low blood sugar and actions for lowering  high blood glucose level. Outcome: Progressing Towards Goal  Goal: *Prevention, detection and treatment of chronic complications  Description: Define the natural course of diabetes and describe the relationship of blood glucose levels to long term complications of diabetes.   Outcome: Progressing Towards Goal  Goal: *Developing strategies to address psychosocial issues  Description: Describe feelings about living with diabetes; identify support needed and support network  Outcome: Progressing Towards Goal  Goal: *Insulin pump training  Outcome: Progressing Towards Goal  Goal: *Sick day guidelines  Outcome: Progressing Towards Goal  Goal: *Patient Specific Goal (EDIT GOAL, INSERT TEXT)  Outcome: Progressing Towards Goal     Problem: Patient Education: Go to Patient Education Activity  Goal: Patient/Family Education  Outcome: Progressing Towards Goal

## 2023-01-24 ENCOUNTER — APPOINTMENT (OUTPATIENT)
Dept: GENERAL RADIOLOGY | Age: 63
DRG: 166 | End: 2023-01-24
Attending: NURSE PRACTITIONER
Payer: COMMERCIAL

## 2023-01-24 LAB
ALBUMIN SERPL-MCNC: 2 G/DL (ref 3.5–5)
ANION GAP SERPL CALC-SCNC: 7 MMOL/L (ref 5–15)
BACTERIA SPEC CULT: ABNORMAL
BACTERIA SPEC CULT: ABNORMAL
BACTERIA SPEC CULT: NORMAL
BASOPHILS # BLD: 0.1 K/UL (ref 0–0.1)
BASOPHILS NFR BLD: 1 % (ref 0–1)
BUN SERPL-MCNC: 7 MG/DL (ref 6–20)
BUN/CREAT SERPL: 11 (ref 12–20)
CALCIUM SERPL-MCNC: 8.8 MG/DL (ref 8.5–10.1)
CHLORIDE SERPL-SCNC: 99 MMOL/L (ref 97–108)
CO2 SERPL-SCNC: 31 MMOL/L (ref 21–32)
CREAT SERPL-MCNC: 0.65 MG/DL (ref 0.55–1.02)
DIFFERENTIAL METHOD BLD: ABNORMAL
EOSINOPHIL # BLD: 0.2 K/UL (ref 0–0.4)
EOSINOPHIL NFR BLD: 2 % (ref 0–7)
ERYTHROCYTE [DISTWIDTH] IN BLOOD BY AUTOMATED COUNT: 13.4 % (ref 11.5–14.5)
GLUCOSE BLD STRIP.AUTO-MCNC: 206 MG/DL (ref 65–117)
GLUCOSE BLD STRIP.AUTO-MCNC: 221 MG/DL (ref 65–117)
GLUCOSE BLD STRIP.AUTO-MCNC: 269 MG/DL (ref 65–117)
GLUCOSE BLD STRIP.AUTO-MCNC: 270 MG/DL (ref 65–117)
GLUCOSE SERPL-MCNC: 237 MG/DL (ref 65–100)
GRAM STN SPEC: ABNORMAL
GRAM STN SPEC: ABNORMAL
HCT VFR BLD AUTO: 37.9 % (ref 35–47)
HGB BLD-MCNC: 12.7 G/DL (ref 11.5–16)
IMM GRANULOCYTES # BLD AUTO: 0.2 K/UL (ref 0–0.04)
IMM GRANULOCYTES NFR BLD AUTO: 2 % (ref 0–0.5)
LYMPHOCYTES # BLD: 1.6 K/UL (ref 0.8–3.5)
LYMPHOCYTES NFR BLD: 15 % (ref 12–49)
MCH RBC QN AUTO: 28.5 PG (ref 26–34)
MCHC RBC AUTO-ENTMCNC: 33.5 G/DL (ref 30–36.5)
MCV RBC AUTO: 85 FL (ref 80–99)
MONOCYTES # BLD: 1 K/UL (ref 0–1)
MONOCYTES NFR BLD: 10 % (ref 5–13)
NEUTS SEG # BLD: 7.4 K/UL (ref 1.8–8)
NEUTS SEG NFR BLD: 70 % (ref 32–75)
NRBC # BLD: 0 K/UL (ref 0–0.01)
NRBC BLD-RTO: 0 PER 100 WBC
PHOSPHATE SERPL-MCNC: 3.3 MG/DL (ref 2.6–4.7)
PLATELET # BLD AUTO: 288 K/UL (ref 150–400)
PMV BLD AUTO: 10.2 FL (ref 8.9–12.9)
POTASSIUM SERPL-SCNC: 3.6 MMOL/L (ref 3.5–5.1)
RBC # BLD AUTO: 4.46 M/UL (ref 3.8–5.2)
SERVICE CMNT-IMP: ABNORMAL
SERVICE CMNT-IMP: NORMAL
SODIUM SERPL-SCNC: 137 MMOL/L (ref 136–145)
WBC # BLD AUTO: 10.5 K/UL (ref 3.6–11)

## 2023-01-24 PROCEDURE — 36415 COLL VENOUS BLD VENIPUNCTURE: CPT

## 2023-01-24 PROCEDURE — 74011636637 HC RX REV CODE- 636/637: Performed by: INTERNAL MEDICINE

## 2023-01-24 PROCEDURE — 82962 GLUCOSE BLOOD TEST: CPT

## 2023-01-24 PROCEDURE — 74011250636 HC RX REV CODE- 250/636: Performed by: INTERNAL MEDICINE

## 2023-01-24 PROCEDURE — 74011000258 HC RX REV CODE- 258: Performed by: INTERNAL MEDICINE

## 2023-01-24 PROCEDURE — 85025 COMPLETE CBC W/AUTO DIFF WBC: CPT

## 2023-01-24 PROCEDURE — 74011000250 HC RX REV CODE- 250: Performed by: INTERNAL MEDICINE

## 2023-01-24 PROCEDURE — 65270000029 HC RM PRIVATE

## 2023-01-24 PROCEDURE — 80069 RENAL FUNCTION PANEL: CPT

## 2023-01-24 PROCEDURE — 71045 X-RAY EXAM CHEST 1 VIEW: CPT

## 2023-01-24 PROCEDURE — 74011250637 HC RX REV CODE- 250/637: Performed by: INTERNAL MEDICINE

## 2023-01-24 RX ADMIN — GUAIFENESIN 1200 MG: 600 TABLET ORAL at 09:28

## 2023-01-24 RX ADMIN — INSULIN LISPRO 3 UNITS: 100 INJECTION, SOLUTION INTRAVENOUS; SUBCUTANEOUS at 21:22

## 2023-01-24 RX ADMIN — BENZONATATE 100 MG: 100 CAPSULE ORAL at 09:28

## 2023-01-24 RX ADMIN — BENZONATATE 100 MG: 100 CAPSULE ORAL at 16:44

## 2023-01-24 RX ADMIN — INSULIN HUMAN 20 UNITS: 100 INJECTION, SUSPENSION SUBCUTANEOUS at 16:57

## 2023-01-24 RX ADMIN — CEFEPIME 2 G: 20 INJECTION, POWDER, FOR SOLUTION INTRAVENOUS at 03:16

## 2023-01-24 RX ADMIN — INSULIN HUMAN 20 UNITS: 100 INJECTION, SUSPENSION SUBCUTANEOUS at 09:29

## 2023-01-24 RX ADMIN — VALSARTAN: 80 TABLET ORAL at 09:30

## 2023-01-24 RX ADMIN — INSULIN LISPRO 3 UNITS: 100 INJECTION, SOLUTION INTRAVENOUS; SUBCUTANEOUS at 12:23

## 2023-01-24 RX ADMIN — INSULIN LISPRO 3 UNITS: 100 INJECTION, SOLUTION INTRAVENOUS; SUBCUTANEOUS at 09:28

## 2023-01-24 RX ADMIN — GUAIFENESIN 1200 MG: 600 TABLET ORAL at 20:48

## 2023-01-24 RX ADMIN — INSULIN LISPRO 5 UNITS: 100 INJECTION, SOLUTION INTRAVENOUS; SUBCUTANEOUS at 16:58

## 2023-01-24 RX ADMIN — Medication 10 ML: at 21:22

## 2023-01-24 RX ADMIN — METRONIDAZOLE 500 MG: 500 INJECTION, SOLUTION INTRAVENOUS at 09:28

## 2023-01-24 RX ADMIN — CEFEPIME 2 G: 20 INJECTION, POWDER, FOR SOLUTION INTRAVENOUS at 16:44

## 2023-01-24 RX ADMIN — Medication 10 ML: at 05:56

## 2023-01-24 RX ADMIN — BENZONATATE 100 MG: 100 CAPSULE ORAL at 21:22

## 2023-01-24 RX ADMIN — ENOXAPARIN SODIUM 40 MG: 100 INJECTION SUBCUTANEOUS at 09:28

## 2023-01-24 NOTE — PROGRESS NOTES
[facility]     Name: Olga Lidia Negrete MRN: [id number]   : 1960 Hospital: [address]   Date: 2023        Impression Plan   Acute respiratory failure  Hypoxia  Loculated pleural effusion- suspected empyema  Pleuritic chest pain               S/P chest tube for draining and analyzes of fluid 23. S/P TPA and dornase 23. Removed 22  F/U pleural fluid labs  Wean O2 to keep sats above 90% - currently on RA  Continue Cefepime; stop Flagyl  CT removed this morning. Repeat CXR. Will need follow up CT chest in 4-6 weeks  Follow up blood cultures  OOB into chair       Radiology  ( personally reviewed) CT chest reviewed: small right sided pleural effusion with lateral loculations. No inparenchymal infiltrates. ABG No results for input(s): PHI, PO2I, PCO2I in the last 72 hours. Subjective     Cc: back pain    59 yo with PMHx of HTN presenting with increasing pleuritic chest pain and scapular back pain on the right. States she had a URI over a week ago that initially improved, however then developed low grade fevers and scapular back pain. CT chest revealed a small pleural effusion on the right with loculations. Pt denies any previous lung problems and is a lifelong non-smoker. Currently on 3 liters O2 with sats of 96%    23 : Sitting up in bed. Overall feeling a little better today. Minimal out from pleural drain. We discussed TPA/Dornase. Have d/w pharmacy and will plan to administer today when available. Cornelious Oz:; pt seen, the tube drainage post first dose of tpa/dornase this am is now very bloody, tube flushes ok, I decided not to repeat infustion for now to avoid hemothorax, will get repeat ct tonight to assess how much residual fluid there is, suction increased on tube. 23 : Approx 700ml bloody drainage over last 24hours. CT w/ small residual effusion. Some tightness with deep breath, improving. No sig cough. No dyspnea. Remains on RA. D/W Dr. Nolvia Saldivar. Will continue pleural drain and hold on TPA/Dornase. Review of Systems:  A comprehensive review of systems was negative except for that written in the HPI. Interval History:    Afebrile  BP stable  HR 90-100s  K 3.6--better  Sats 93% on RA  Pleural fluid culture with scant klebsiella oxytoca  Blood cultures no growth x 5 days    ROS: Feeling good today. Denies SOB. Still having  hard time taking a deep breath at times. Past Medical History:   Diagnosis Date    Hypertension     Type 2 diabetes mellitus ([address]       Past Surgical History:   Procedure Laterality Date    HX CHOLECYSTECTOMY      HX ERCP      HX LAP CHOLECYSTECTOMY  08/11/2019      Prior to Admission medications    Medication Sig Start Date End Date Taking? Authorizing Provider   ascorbic acid, vitamin C, (Vitamin C) 500 mg tablet Take 1,000 mg by mouth daily. Yes Provider, Historical   cholecalciferol (Vitamin D3) 25 mcg (1,000 unit) cap Take 1,000 Units by mouth daily. Yes Provider, Historical   OneTouch Delica Plus Lancet 33 gauge misc use 1 LANCET to TEST BLOOD SUGAR DAILY 12/13/22  Yes Jovanni Menjivar MD   valsartan-hydroCHLOROthiazide (DIOVAN-HCT) 160-12.5 mg per tablet take 1 tablet by mouth once daily 10/11/22  Yes Jovanni Menjivar MD   metFORMIN (GLUCOPHAGE) 500 mg tablet take 1 tablet by mouth twice a day with meals 10/11/22   Jovanni Menjivar MD   OneTouch Ultra Test strip use 1 TEST STRIP to TEST BLOOD SUGAR once daily 6/14/22   Jovanni Menjivar MD   lancets misc Check fasting blood sugars daily.  8/22/19   MD Kin Byers Lashay ULTRAMINI monitoring kit use as directed 8/22/19   Jovanni Menjivar MD     Current Facility-Administered Medications   Medication Dose Route Frequency    insulin NPH (NOVOLIN N, HUMULIN N) injection 20 Units  20 Units SubCUTAneous ACB&D    metroNIDAZOLE (FLAGYL) IVPB premix 500 mg  500 mg IntraVENous Q12H    valsartan/hydroCHLOROthiazide (DIOVAN HCT) 160/12.5 mg   Oral DAILY sodium chloride (NS) flush 5-40 mL  5-40 mL IntraVENous Q8H    enoxaparin (LOVENOX) injection 40 mg  40 mg SubCUTAneous DAILY    cefepime (MAXIPIME) 2 g in 0.9% sodium chloride (MBP/ADV) 100 mL MBP  2 g IntraVENous Q8H    lactated Ringers infusion  75 mL/hr IntraVENous CONTINUOUS    insulin lispro (HUMALOG) injection   SubCUTAneous QID WITH MEALS    benzonatate (TESSALON) capsule 100 mg  100 mg Oral TID    guaiFENesin ER (MUCINEX) tablet 1,200 mg  1,200 mg Oral BID     No Known Allergies   Social History     Tobacco Use    Smoking status: Never    Smokeless tobacco: Never   Substance Use Topics    Alcohol use: Yes     Alcohol/week: 1.0 standard drink     Types: 1 Glasses of wine per week     Comment: 1-3 drinks per month      Family History   Problem Relation Age of Onset    Diabetes Mother         type 2    Hypertension Mother     Diabetes Sister     Congenital heart defect Maternal Grandmother           Laboratory: I have personally reviewed the critical care flowsheet and labs. Recent Labs     01/24/23  0122 01/23/23  0127 01/22/23  0113   WBC 10.5 9.2 13.5*   HGB 12.7 11.4* 12.1   HCT 37.9 34.7* 36.3    229 227       Recent Labs     01/24/23  0122 01/23/23  0127 01/22/23  0113    137 134*   K 3.6 3.3* 3.4*   CL 99 100 97   CO2 31 29 29   * 249* 321*   BUN 7 8 8   CREA 0.65 0.52* 0.59   CA 8.8 8.2* 8.6   PHOS 3.3  --   --    ALB 2.0*  --   --          Objective:     Mode Rate Tidal Volume Pressure FiO2 PEEP                    Vital Signs:     TMAX(24)      Intake/Output:   Last shift:         Last 3 shifts: No intake/output data recorded. RRIOLAST3  Intake/Output Summary (Last 24 hours) at 1/24/2023 1029  Last data filed at 1/24/2023 0347  Gross per 24 hour   Intake 2570 ml   Output 31 ml   Net 2539 ml       EXAM:   GENERAL: well developed, awake, alert, HEENT:  PERRL, EOMI, no alar flaring or epistaxis, oral mucosa moist without cyanosis, NECK:  no jugular vein distention, no retractions, no thyromegaly or masses, LUNGS: crackles right base.  Diminished bilaterally R>L HEART:  Regular rate and rhythm with no MGR; no edema is present, ABDOMEN:  soft with no tenderness, bowel sounds present, EXTREMITIES:  warm with no cyanosis, SKIN:  no jaundice or ecchymosis, and NEUROLOGIC:  alert and oriented, grossly non-focal    Berneice Roes, NP  [facility]

## 2023-01-24 NOTE — PROGRESS NOTES
Spiritual Care Assessment/Progress Note  [address]      NAME: Hailee Henderson      MRN: [id number]  AGE: 58 y.o. SEX: female  Sabianism Affiliation: Baptism   Language: English     1/24/2023     Total Time (in minutes): 5     Spiritual Assessment begun in [facility]  MED SURG 2 through conversation with:         []Patient        [] Family    [] Friend(s)        Reason for Consult: [facility]     Spiritual beliefs: (Please include comment if needed)     [x] Identifies with a keron tradition:  Baptism       [x] Supported by a keron community: Epiphany           [] Claims no spiritual orientation:           [] Seeking spiritual identity:                [] Adheres to an individual form of spirituality:           [] Not able to assess:                           Identified resources for coping:      [x] Prayer                               [x] Music                  [] Guided Imagery     [x] Family/friends                 [] Pet visits     [] Devotional reading                         [] Unknown     [] Other:                                               Interventions offered during this visit: (See comments for more details)    Patient Interventions: Affirmation of keron, Communion (Baptism), Initial/Spiritual assessment, patient floor, Prayer (actual), Prayer (assurance of)           Plan of Care:     [x] Support spiritual and/or cultural needs    [] Support AMD and/or advance care planning process      [] Support grieving process   [] Coordinate Rites and/or Rituals    [] Coordination with community clergy   [] No spiritual needs identified at this time   [] Detailed Plan of Care below (See Comments)  [] Make referral to Music Therapy  [] Make referral to Pet Therapy     [] Make referral to Addiction services  [] Make referral to [facility]  [] Make referral to Spiritual Care Partner  [] No future visits requested        [] Contact Spiritual Care for further referrals     Comments:   Andre Winter was sitting up in her chair and cheerful. Prayer and communion offered. Her visitor declined. Mrs. Luke Marcano is hoping to go home soon.     Jeanie Officer, NATAN, RN, ACSW, LCSW   Page:  [phone]

## 2023-01-24 NOTE — PROGRESS NOTES
1/24/2023   CARE MANAGEMENT NOTE:  CM reviewed EMR. Pt was admitted with loculated right plerual effusion and s/p chest tube; also DM, HTN. Reportedly, pt resides with her  Gage Larson ([phone]). RUR 4%     Transition Plan of Care:  Pulmonary following for medical management  Plan is for pt to return home with her   Outpatient follow up   will transport pt home      CM will continue to follow pt until discharged.   Adarsh

## 2023-01-24 NOTE — PROGRESS NOTES
Rounded on Religion patients and provided Anointing of the Sick at request of patient.     Sami Rossi

## 2023-01-24 NOTE — PROGRESS NOTES
[facility]  [address] [facility], [address] [place] [address]  [phone]      Hospitalist Progress Note      NAME: Zunilda Sadler   :  1960  MRM:  [id number]    Date of service: 2023  6:50 AM    Patient is a pleasant 58 YOF admitted for a loculated pleural effusion. Assessment and Plan:   Loculated R pleural effusion: Chest CTA - no PE, small right loculated pleural effusion. S/p chest tube , removed . S/P TPA and dornase. Pending fluid analysis. On IV cefepime now completed flagyl. Vanc off - Klebsiella oxytoca on Cx. Appreciated pulmonary evaluation     2. DM 2:  ISS w/ BG checks. Added NPH. Hold home metformin. Check A1C     4. HTN: Cont w/ Diovan-HCTZ 160/12.5mg daily    Reviewed outside notes, personally reviewed labs and images. Discussed with multi D rounds, RN, Pulmonary,  at bedside. Subjective:     Chief Complaint[de-identified] Patient was seen and examined as a follow up for pleural effusion. Chart was reviewed. Chest pain better after chest tube placement and fells well. ROS:  (bold if positive, if negative)    Tolerating PT  Tolerating Diet        Objective:     Last 24hrs VS reviewed since prior progress note.  Most recent are:    Visit Vitals  /84 (BP 1 Location: Right upper arm, BP Patient Position: Sitting)   Pulse 100   Temp 98.5 °F (36.9 °C)   Resp 18   Ht 5' 2\" (1.575 m)   Wt 88.5 kg (195 lb)   SpO2 92%   BMI 35.67 kg/m²     SpO2 Readings from Last 6 Encounters:   23 92%   21 98%   20 97%   19 98%   10/07/19 98%   09/15/19 96%    O2 Flow Rate (L/min): 1 l/min     Intake/Output Summary (Last 24 hours) at 2023 1454  Last data filed at 2023 1328  Gross per 24 hour   Intake 3040 ml   Output 31 ml   Net 3009 ml          Physical Exam:    Gen:  Well-developed, well-nourished, in no acute distress  HEENT:  Pink conjunctivae, PERRL, hearing intact to voice, moist mucous membranes  Neck:  Supple, without masses, thyroid non-tender  Resp:  No accessory muscle use, rales on the RT, s/p removal of CT  Card:  No murmurs, normal S1, S2 without thrills, bruits or peripheral edema  Abd:  Soft, non-tender, non-distended, normoactive bowel sounds are present, no palpable organomegaly and no detectable hernias  Lymph:  No cervical or inguinal adenopathy  Musc:  No cyanosis or clubbing  Skin:  No rashes or ulcers, skin turgor is good  Neuro:  Cranial nerves are grossly intact, no focal motor weakness, follows commands appropriately  Psych:  Good insight, oriented to person, place and time, alert  __________________________________________________________________  Medications Reviewed: (see below)  Medications:     Current Facility-Administered Medications   Medication Dose Route Frequency    insulin NPH (NOVOLIN N, HUMULIN N) injection 20 Units  20 Units SubCUTAneous ACB&D    valsartan/hydroCHLOROthiazide (DIOVAN HCT) 160/12.5 mg   Oral DAILY    sodium chloride (NS) flush 5-40 mL  5-40 mL IntraVENous Q8H    sodium chloride (NS) flush 5-40 mL  5-40 mL IntraVENous PRN    acetaminophen (TYLENOL) tablet 650 mg  650 mg Oral Q6H PRN    Or    acetaminophen (TYLENOL) suppository 650 mg  650 mg Rectal Q6H PRN    polyethylene glycol (MIRALAX) packet 17 g  17 g Oral DAILY PRN    ondansetron (ZOFRAN ODT) tablet 4 mg  4 mg Oral Q8H PRN    Or    ondansetron (ZOFRAN) injection 4 mg  4 mg IntraVENous Q6H PRN    enoxaparin (LOVENOX) injection 40 mg  40 mg SubCUTAneous DAILY    cefepime (MAXIPIME) 2 g in 0.9% sodium chloride (MBP/ADV) 100 mL MBP  2 g IntraVENous Q8H    lactated Ringers infusion  75 mL/hr IntraVENous CONTINUOUS    insulin lispro (HUMALOG) injection   SubCUTAneous QID WITH MEALS    glucose chewable tablet 16 g  4 Tablet Oral PRN    glucagon (GLUCAGEN) injection 1 mg  1 mg IntraMUSCular PRN    dextrose 10% infusion 0-250 mL  0-250 mL IntraVENous PRN    benzonatate (TESSALON) capsule 100 mg  100 mg Oral TID    guaiFENesin ER [facility]) tablet 1,200 mg  1,200 mg Oral BID        Lab Data Reviewed: (see below)  Lab Review:     Recent Labs     01/24/23  0122 01/23/23  0127 01/22/23  0113   WBC 10.5 9.2 13.5*   HGB 12.7 11.4* 12.1   HCT 37.9 34.7* 36.3    229 227       Recent Labs     01/24/23  0122 01/23/23  0127 01/22/23  0113    137 134*   K 3.6 3.3* 3.4*   CL 99 100 97   CO2 31 29 29   * 249* 321*   BUN 7 8 8   CREA 0.65 0.52* 0.59   CA 8.8 8.2* 8.6   PHOS 3.3  --   --    ALB 2.0*  --   --        Lab Results   Component Value Date/Time    Glucose (POC) 221 (H) 01/24/2023 11:04 AM    Glucose (POC) 206 (H) 01/24/2023 07:38 AM    Glucose (POC) 266 (H) 01/23/2023 09:03 PM    Glucose (POC) 237 (H) 01/23/2023 04:19 PM    Glucose (POC) 314 (H) 01/23/2023 10:59 AM     No results for input(s): PH, PCO2, PO2, HCO3, FIO2 in the last 72 hours. No results for input(s): INR, INREXT, INREXT in the last 72 hours. All Micro Results       Procedure Component Value Units Date/Time    CULTURE, BODY FLUID [[id number]]  (Abnormal)  (Susceptibility) Collected: 01/20/23 1051    Order Status: Completed Specimen:  Body Fluid from Pleural Fluid Updated: 01/24/23 0716     Special Requests: NO SPECIAL REQUESTS        GRAM STAIN FEW WBCS SEEN         NO ORGANISMS SEEN        Culture result: SCANT Klebsiella oxytoca               Culture performed on Fluid swab specimen          CULTURE, BLOOD, PAIRED [[id number]] Collected: 01/19/23 1900    Order Status: Completed Specimen: Blood Updated: 01/24/23 0504     Special Requests: NO SPECIAL REQUESTS        Culture result: NO GROWTH 5 DAYS       CULTURE, FUNGUS [[id number]] Collected: 01/20/23 1051    Order Status: Completed Specimen: Pleural Fluid Updated: 01/23/23 0732     Special Requests: NO SPECIAL REQUESTS        Culture result: NO FUNGUS ISOLATED 3 DAYS       CULTURE, MRSA [[id number]] Collected: 01/20/23 1424    Order Status: Completed Specimen: Nasal from Nares Updated: 01/21/23 0553     Special Requests: NO SPECIAL REQUESTS        Culture result: MRSA NOT PRESENT               Screening of patient nares for MRSA is for surveillance purposes and, if positive, to facilitate isolation considerations in high risk settings. It is not intended for automatic decolonization interventions per se as regimens are not sufficiently effective to warrant routine use. AFB CULTURE + SMEAR W/RFLX ID FROM CULTURE [[id number]] Collected: 01/20/23 0830    Order Status: Canceled     COVID-19 RAPID TEST [[id number]] Collected: 01/19/23 1740    Order Status: Completed Specimen: Nasopharyngeal Updated: 01/19/23 1811     Specimen source Nasopharyngeal        COVID-19 rapid test Not detected        Comment: Rapid Abbott ID Now       Rapid NAAT:  The specimen is NEGATIVE for SARS-CoV-2, the novel coronavirus associated with COVID-19. Negative results should be treated as presumptive and, if inconsistent with clinical signs and symptoms or necessary for patient management, should be tested with an alternative molecular assay. Negative results do not preclude SARS-CoV-2 infection and should not be used as the sole basis for patient management decisions. This test has been authorized by the FDA under an Emergency Use Authorization (EUA) for use by authorized laboratories. Fact sheet for Healthcare Providers:  http://www.jesus-kyle.binanette/  Fact sheet for Patients: http://www.lee-wright.biz/       Methodology: Isothermal Nucleic Acid Amplification                 I have reviewed notes of prior 24hr. Other pertinent lab: Total time spent with patient: 35 minutes I personally reviewed chart, notes, data and current medications in the medical record. I have personally examined and treated the patient at bedside during this period.                  Care Plan discussed with: Patient, Nursing Staff, and >50% of time spent in counseling and coordination of care    Discussed:  Care Plan    Prophylaxis:  Lovenox    Disposition:  Home w/Family           ___________________________________________________    Attending Physician: Love Fitzgerald MD

## 2023-01-24 NOTE — PROGRESS NOTES
Physician Progress Note      Andra Preciado  CSN #:                  [id number]  :                       1960  ADMIT DATE:       2023 5:39 PM  [address] [place] DATE:  RESPONDING  PROVIDER #:        Lindsay Deluna MD          QUERY TEXT:    Good morning  Pt admitted with loculated pleural effusion. Pt noted to have AHRF and possible empyema. If possible, please document in the progress notes and discharge summary if you are evaluating and /or treating any of the following: The medical record reflects the following:  Risk Factors: loculated pleural effusion, acute resp failure, suspected empyema  Clinical Indicators: VS: 100.4, 134, 20, 145/97, 93% on RA, 12.6>>>11.5>>>13.5, small right loculated pleural effusion, Gram stain-SCANT Gram negative rods  Treatment: CXR, CTA, daily labs, IVF bolus, IV Cefepime, Gram stain, pulmonology consult    Thank you  Sarah Stein RN [facility]  [phone]  Options provided:  -- Sepsis, present on admission  -- Localized infection without Sepsis  -- Sepsis was ruled out  -- Other - I will add my own diagnosis  -- Disagree - Not applicable / Not valid  -- Disagree - Clinically unable to determine / Unknown  -- Refer to Clinical Documentation Reviewer    PROVIDER RESPONSE TEXT:    After further study, sepsis was ruled out for this patient.     Query created by: Eric Goodrich on 2023 8:03 AM      Electronically signed by:  Lindsay Deluna MD 2023 5:10 PM

## 2023-01-24 NOTE — PROGRESS NOTES
Bedside shift change report given to Barron (oncoming nurse) by José Rosas (offgoing nurse). Report included the following information SBAR, Kardex, and MAR.

## 2023-01-25 ENCOUNTER — APPOINTMENT (OUTPATIENT)
Dept: GENERAL RADIOLOGY | Age: 63
DRG: 166 | End: 2023-01-25
Attending: INTERNAL MEDICINE
Payer: COMMERCIAL

## 2023-01-25 LAB
ALBUMIN SERPL-MCNC: 2 G/DL (ref 3.5–5)
ANION GAP SERPL CALC-SCNC: 7 MMOL/L (ref 5–15)
BASOPHILS # BLD: 0.1 K/UL (ref 0–0.1)
BASOPHILS NFR BLD: 1 % (ref 0–1)
BUN SERPL-MCNC: 8 MG/DL (ref 6–20)
BUN/CREAT SERPL: 13 (ref 12–20)
CALCIUM SERPL-MCNC: 8.3 MG/DL (ref 8.5–10.1)
CHLORIDE SERPL-SCNC: 99 MMOL/L (ref 97–108)
CO2 SERPL-SCNC: 30 MMOL/L (ref 21–32)
CREAT SERPL-MCNC: 0.6 MG/DL (ref 0.55–1.02)
DIFFERENTIAL METHOD BLD: ABNORMAL
EOSINOPHIL # BLD: 0.2 K/UL (ref 0–0.4)
EOSINOPHIL NFR BLD: 2 % (ref 0–7)
ERYTHROCYTE [DISTWIDTH] IN BLOOD BY AUTOMATED COUNT: 13.3 % (ref 11.5–14.5)
GLUCOSE BLD STRIP.AUTO-MCNC: 189 MG/DL (ref 65–117)
GLUCOSE BLD STRIP.AUTO-MCNC: 203 MG/DL (ref 65–117)
GLUCOSE BLD STRIP.AUTO-MCNC: 207 MG/DL (ref 65–117)
GLUCOSE BLD STRIP.AUTO-MCNC: 211 MG/DL (ref 65–117)
GLUCOSE SERPL-MCNC: 207 MG/DL (ref 65–100)
HCT VFR BLD AUTO: 35.9 % (ref 35–47)
HGB BLD-MCNC: 12 G/DL (ref 11.5–16)
IMM GRANULOCYTES # BLD AUTO: 0.2 K/UL (ref 0–0.04)
IMM GRANULOCYTES NFR BLD AUTO: 2 % (ref 0–0.5)
LYMPHOCYTES # BLD: 1.2 K/UL (ref 0.8–3.5)
LYMPHOCYTES NFR BLD: 13 % (ref 12–49)
MCH RBC QN AUTO: 28.2 PG (ref 26–34)
MCHC RBC AUTO-ENTMCNC: 33.4 G/DL (ref 30–36.5)
MCV RBC AUTO: 84.3 FL (ref 80–99)
MONOCYTES # BLD: 0.9 K/UL (ref 0–1)
MONOCYTES NFR BLD: 9 % (ref 5–13)
NEUTS SEG # BLD: 6.9 K/UL (ref 1.8–8)
NEUTS SEG NFR BLD: 73 % (ref 32–75)
NRBC # BLD: 0 K/UL (ref 0–0.01)
NRBC BLD-RTO: 0 PER 100 WBC
PHOSPHATE SERPL-MCNC: 3.1 MG/DL (ref 2.6–4.7)
PLATELET # BLD AUTO: 266 K/UL (ref 150–400)
PMV BLD AUTO: 10 FL (ref 8.9–12.9)
POTASSIUM SERPL-SCNC: 3 MMOL/L (ref 3.5–5.1)
RBC # BLD AUTO: 4.26 M/UL (ref 3.8–5.2)
SERVICE CMNT-IMP: ABNORMAL
SODIUM SERPL-SCNC: 136 MMOL/L (ref 136–145)
WBC # BLD AUTO: 9.5 K/UL (ref 3.6–11)

## 2023-01-25 PROCEDURE — 65270000029 HC RM PRIVATE

## 2023-01-25 PROCEDURE — 74011000250 HC RX REV CODE- 250: Performed by: INTERNAL MEDICINE

## 2023-01-25 PROCEDURE — 85025 COMPLETE CBC W/AUTO DIFF WBC: CPT

## 2023-01-25 PROCEDURE — 74011250636 HC RX REV CODE- 250/636: Performed by: INTERNAL MEDICINE

## 2023-01-25 PROCEDURE — 74011000258 HC RX REV CODE- 258: Performed by: INTERNAL MEDICINE

## 2023-01-25 PROCEDURE — 80069 RENAL FUNCTION PANEL: CPT

## 2023-01-25 PROCEDURE — 74011250637 HC RX REV CODE- 250/637: Performed by: INTERNAL MEDICINE

## 2023-01-25 PROCEDURE — 82962 GLUCOSE BLOOD TEST: CPT

## 2023-01-25 PROCEDURE — 36415 COLL VENOUS BLD VENIPUNCTURE: CPT

## 2023-01-25 PROCEDURE — 71046 X-RAY EXAM CHEST 2 VIEWS: CPT

## 2023-01-25 PROCEDURE — 74011636637 HC RX REV CODE- 636/637: Performed by: INTERNAL MEDICINE

## 2023-01-25 PROCEDURE — 74011250637 HC RX REV CODE- 250/637: Performed by: NURSE PRACTITIONER

## 2023-01-25 RX ORDER — SULFAMETHOXAZOLE AND TRIMETHOPRIM 800; 160 MG/1; MG/1
1 TABLET ORAL 2 TIMES DAILY WITH MEALS
Status: DISCONTINUED | OUTPATIENT
Start: 2023-01-25 | End: 2023-01-26 | Stop reason: HOSPADM

## 2023-01-25 RX ORDER — POTASSIUM CHLORIDE 750 MG/1
40 TABLET, FILM COATED, EXTENDED RELEASE ORAL 2 TIMES DAILY
Status: COMPLETED | OUTPATIENT
Start: 2023-01-25 | End: 2023-01-25

## 2023-01-25 RX ADMIN — BENZONATATE 100 MG: 100 CAPSULE ORAL at 08:45

## 2023-01-25 RX ADMIN — CEFEPIME 2 G: 20 INJECTION, POWDER, FOR SOLUTION INTRAVENOUS at 08:47

## 2023-01-25 RX ADMIN — INSULIN HUMAN 20 UNITS: 100 INJECTION, SUSPENSION SUBCUTANEOUS at 08:46

## 2023-01-25 RX ADMIN — CEFEPIME 2 G: 20 INJECTION, POWDER, FOR SOLUTION INTRAVENOUS at 00:48

## 2023-01-25 RX ADMIN — Medication 10 ML: at 21:01

## 2023-01-25 RX ADMIN — INSULIN LISPRO 3 UNITS: 100 INJECTION, SOLUTION INTRAVENOUS; SUBCUTANEOUS at 11:53

## 2023-01-25 RX ADMIN — Medication 10 ML: at 06:03

## 2023-01-25 RX ADMIN — BENZONATATE 100 MG: 100 CAPSULE ORAL at 17:01

## 2023-01-25 RX ADMIN — VALSARTAN: 80 TABLET ORAL at 08:45

## 2023-01-25 RX ADMIN — INSULIN LISPRO 2 UNITS: 100 INJECTION, SOLUTION INTRAVENOUS; SUBCUTANEOUS at 08:47

## 2023-01-25 RX ADMIN — INSULIN HUMAN 20 UNITS: 100 INJECTION, SUSPENSION SUBCUTANEOUS at 17:01

## 2023-01-25 RX ADMIN — ENOXAPARIN SODIUM 40 MG: 100 INJECTION SUBCUTANEOUS at 08:47

## 2023-01-25 RX ADMIN — POTASSIUM CHLORIDE 40 MEQ: 750 TABLET, FILM COATED, EXTENDED RELEASE ORAL at 11:53

## 2023-01-25 RX ADMIN — SULFAMETHOXAZOLE AND TRIMETHOPRIM 1 TABLET: 800; 160 TABLET ORAL at 17:01

## 2023-01-25 RX ADMIN — INSULIN LISPRO 2 UNITS: 100 INJECTION, SOLUTION INTRAVENOUS; SUBCUTANEOUS at 21:17

## 2023-01-25 RX ADMIN — INSULIN LISPRO 2 UNITS: 100 INJECTION, SOLUTION INTRAVENOUS; SUBCUTANEOUS at 17:01

## 2023-01-25 RX ADMIN — SODIUM CHLORIDE, POTASSIUM CHLORIDE, SODIUM LACTATE AND CALCIUM CHLORIDE 75 ML/HR: 600; 310; 30; 20 INJECTION, SOLUTION INTRAVENOUS at 00:50

## 2023-01-25 RX ADMIN — GUAIFENESIN 1200 MG: 600 TABLET ORAL at 08:46

## 2023-01-25 RX ADMIN — BENZONATATE 100 MG: 100 CAPSULE ORAL at 21:00

## 2023-01-25 RX ADMIN — GUAIFENESIN 1200 MG: 600 TABLET ORAL at 21:01

## 2023-01-25 RX ADMIN — POTASSIUM CHLORIDE 40 MEQ: 750 TABLET, FILM COATED, EXTENDED RELEASE ORAL at 08:46

## 2023-01-25 NOTE — ROUTINE PROCESS
Bedside and Verbal shift change report given to Barron  (oncoming nurse) by Barron  (offgoing nurse). Report included the following information SBAR, Kardex, Procedure Summary, MAR, and Recent Results.

## 2023-01-25 NOTE — PROGRESS NOTES
Comprehensive Nutrition Assessment    Type and Reason for Visit: Initial, RD nutrition re-screen/LOS    Nutrition Recommendations/Plan:   Continue regular, 4 carb choice diet order  Consider modification to insulin - BG averaging 250 mg/dL      Malnutrition Assessment:  Malnutrition Status:  No malnutrition (01/25/23 1200)    Context:  Acute illness     Findings of the 6 clinical characteristics of malnutrition:   Energy Intake:  No significant decrease in energy intake  Weight Loss:  No significant weight loss     Body Fat Loss:  No significant body fat loss,     Muscle Mass Loss:  No significant muscle mass loss,    Fluid Accumulation:  No significant fluid accumulation,     Strength:  Not performed     Nutrition Assessment:     Patient is a 58year old female admitted with Loculated pleural effusion [J90]. She  has a past medical history of Hypertension and Type 2 diabetes mellitus ([address] RD screen for LOS. Patient reports appetite is baseline \"fine\", no nausea/vomiting/diarrhea. Per documentation, consuming ~50% of most meals. Denies chewing/swallowing problems. Reports UBW of 190# and denies recent weight or appetite changes. Lack of recent weight history - no clinically significant weight changes noted. Patient's bedscale weight elevated from stated UBW, may be 2/2 slight edema as well as 2 pillows on bed when weight obtained. Provided menu and explained how to order meals. Patient voiced understanding. No complaints at this time. BG has been averaging ~250 mg/dL - may require adjustment in insulin dosage.      Patient Vitals for the past 168 hrs:   % Diet Eaten   01/25/23 0407 0%   01/24/23 1800 76 - 100%   01/24/23 1328 76 - 100%   01/24/23 0928 26 - 50%   01/23/23 2200 0%   01/23/23 1305 51 - 75%   01/22/23 1224 51 - 75%   01/21/23 1454 76 - 100%   01/21/23 0751 51 - 75%     Wt Readings from Last 10 Encounters:   01/25/23 94.7 kg (208 lb 12.8 oz)   04/14/21 80.7 kg (178 lb)   01/08/20 83.9 kg (185 lb) 11/13/19 83.1 kg (183 lb 3.2 oz)   10/07/19 83.5 kg (184 lb)   09/13/19 79.8 kg (176 lb)   09/11/19 81.6 kg (180 lb)   08/22/19 87.1 kg (192 lb)   08/14/19 82.8 kg (182 lb 8 oz)         Nutrition Related Findings:      Wound Type: Surgical incision (New chest tube)  Last Bowel Movement Date: 01/25/23  Stool Appearance: Soft  Abdominal Assessment: Obese, Soft  Appetite: Good  Bowel Sounds: Absent   Edema:LLE: Trace (1/25/2023  8:45 AM)  LUE: Trace (1/24/2023  8:23 PM)  RLE: Trace (1/25/2023  8:45 AM)  RUE: Trace (1/24/2023  8:23 PM)      Nutr. Labs:  Lab Results   Component Value Date/Time    GFR est AA 91 04/14/2021 12:11 PM    GFR est non-AA 79 04/14/2021 12:11 PM    Creatinine 0.60 01/25/2023 01:45 AM    BUN 8 01/25/2023 01:45 AM    Sodium 136 01/25/2023 01:45 AM    Potassium 3.0 (L) 01/25/2023 01:45 AM    Chloride 99 01/25/2023 01:45 AM    CO2 30 01/25/2023 01:45 AM       Lab Results   Component Value Date/Time    Glucose 207 (H) 01/25/2023 01:45 AM    Glucose (POC) 207 (H) 01/25/2023 11:42 AM       Lab Results   Component Value Date/Time    Hemoglobin A1c 12.3 (H) 01/22/2023 01:13 AM       Nutr. Meds:  Lovenox, glucagon PRN, humalog, NPH, LR @ 75, miralax       Current Nutrition Intake & Therapies:  Average Meal Intake: 51-75%  Average Supplement Intake: None ordered  ADULT DIET Regular; 4 carb choices (60 gm/meal)    Anthropometric Measures:  Height: 5' 2\" (157.5 cm)  Ideal Body Weight (IBW): 110 lbs (50 kg)     Current Body Wt:  94.7 kg (208 lb 12.8 oz), 189.8 % IBW. Bed scale  Current BMI (kg/m2): 38.2  Usual Body Weight: 88.5 kg (195 lb)  % Weight Change (Calculated): 7.1  Weight Adjustment: No adjustment                 BMI Category: Obese class 2 (BMI 35.0-39. 9)    Estimated Daily Nutrient Needs:  Energy Requirements Based On: Formula  Weight Used for Energy Requirements: Current  Energy (kcal/day): 1900 (MSJ x 1.3)  Weight Used for Protein Requirements: Current  Protein (g/day): 75-95 (0.8-1.0 g/kg)  Method Used for Fluid Requirements: 1 ml/kcal  Fluid (ml/day): 1820    Nutrition Diagnosis:   Altered nutrition-related lab values related to endocrine dysfunction as evidenced by lab values (A1C 12.3)    Nutrition Interventions:   Food and/or Nutrient Delivery: Continue current diet  Nutrition Education/Counseling: Education needed  Coordination of Nutrition Care: Continue to monitor while inpatient, Interdisciplinary rounds  Plan of Care discussed with: IDR team    Goals:     Goals: Meet at least 75% of estimated needs, by next RD assessment       Nutrition Monitoring and Evaluation:   Behavioral-Environmental Outcomes: None identified  Food/Nutrient Intake Outcomes: Food and nutrient intake  Physical Signs/Symptoms Outcomes: Biochemical data, Weight, GI status    Discharge Planning:    Recommend pursue outpatient diabetes education    Pro James MS, RD  Contact: Ext: [phone], or via [employer]

## 2023-01-25 NOTE — PROGRESS NOTES
1/25/2023   CARE MANAGEMENT NOTE:  CM reviewed EMR. Pt was admitted with loculated right plerual effusion and s/p chest tube; also DM, HTN. Reportedly, pt resides with her  Deborah Braun ([phone]). RUR 4%     Transition Plan of Care:  Pulmonary following for medical management  Plan is for pt to return home with her   Outpatient follow up   will transport pt home      CM will continue to follow pt until discharged.   Adarsh

## 2023-01-25 NOTE — PROGRESS NOTES
Provided pastoral care visit to [facility] 5 patient. Did not include sacramental care.      Yanira Reeder

## 2023-01-25 NOTE — PROGRESS NOTES
Problem: Pain  Goal: *Control of Pain  Outcome: Progressing Towards Goal     Problem: Diabetes Self-Management  Goal: *Disease process and treatment process  Description: Define diabetes and identify own type of diabetes; list 3 options for treating diabetes. Outcome: Progressing Towards Goal  Goal: *Incorporating nutritional management into lifestyle  Description: Describe effect of type, amount and timing of food on blood glucose; list 3 methods for planning meals. Outcome: Progressing Towards Goal  Goal: *Incorporating physical activity into lifestyle  Description: State effect of exercise on blood glucose levels. Outcome: Progressing Towards Goal  Goal: *Developing strategies to promote health/change behavior  Description: Define the ABC's of diabetes; identify appropriate screenings, schedule and personal plan for screenings. Outcome: Progressing Towards Goal  Goal: *Using medications safely  Description: State effect of diabetes medications on diabetes; name diabetes medication taking, action and side effects. Outcome: Progressing Towards Goal  Goal: *Monitoring blood glucose, interpreting and using results  Description: Identify recommended blood glucose targets  and personal targets. Outcome: Progressing Towards Goal  Goal: *Prevention, detection, treatment of acute complications  Description: List symptoms of hyper- and hypoglycemia; describe how to treat low blood sugar and actions for lowering  high blood glucose level. Outcome: Progressing Towards Goal  Goal: *Prevention, detection and treatment of chronic complications  Description: Define the natural course of diabetes and describe the relationship of blood glucose levels to long term complications of diabetes.   Outcome: Progressing Towards Goal  Goal: *Developing strategies to address psychosocial issues  Description: Describe feelings about living with diabetes; identify support needed and support network  Outcome: Progressing Towards Goal  Goal: *Insulin pump training  Outcome: Progressing Towards Goal  Goal: *Sick day guidelines  Outcome: Progressing Towards Goal  Goal: *Patient Specific Goal (EDIT GOAL, INSERT TEXT)  Outcome: Progressing Towards Goal     Problem: Patient Education: Go to Patient Education Activity  Goal: Patient/Family Education  Outcome: Progressing Towards Goal     Problem: Falls - Risk of  Goal: *Absence of Falls  Description: Document Scooby Fall Risk and appropriate interventions in the flowsheet.   Outcome: Progressing Towards Goal  Note: Fall Risk Interventions:                                Problem: Patient Education: Go to Patient Education Activity  Goal: Patient/Family Education  Outcome: Progressing Towards Goal

## 2023-01-25 NOTE — PROGRESS NOTES
[facility]     Name: Mynor Melgar MRN: [id number]   : 1960 Hospital: [address]   Date: 2023        Impression Plan   Acute respiratory failure  Hypoxia  Loculated pleural effusion- suspected empyema  Pleuritic chest pain               S/P chest tube for draining and analyzes of fluid 23. S/P TPA and dornase 23. S/p removal 22  F/U pleural fluid labs  Wean O2 to keep sats above 90% - currently on RA  Continue Cefepime, can switch to Levoflox on discharge  Replete K  Will need follow up CT chest in 4-6 weeks  OOB into chair       Radiology  ( personally reviewed) CT chest reviewed: small right sided pleural effusion with lateral loculations. No inparenchymal infiltrates. ABG No results for input(s): PHI, PO2I, PCO2I in the last 72 hours. Subjective     Cc: back pain    57 yo with PMHx of HTN presenting with increasing pleuritic chest pain and scapular back pain on the right. States she had a URI over a week ago that initially improved, however then developed low grade fevers and scapular back pain. CT chest revealed a small pleural effusion on the right with loculations. Pt denies any previous lung problems and is a lifelong non-smoker. Currently on 3 liters O2 with sats of 96%    23 : Sitting up in bed. Overall feeling a little better today. Minimal out from pleural drain. We discussed TPA/Dornase. Have d/w pharmacy and will plan to administer today when available. Sonia Thorpe:; pt seen, the tube drainage post first dose of tpa/dornase this am is now very bloody, tube flushes ok, I decided not to repeat infustion for now to avoid hemothorax, will get repeat ct tonight to assess how much residual fluid there is, suction increased on tube. 23 : Approx 700ml bloody drainage over last 24hours. CT w/ small residual effusion. Some tightness with deep breath, improving. No sig cough. No dyspnea. Remains on RA. D/W Dr. Alexia Skelton.  Will continue pleural drain and hold on TPA/Dornase. Review of Systems:  A comprehensive review of systems was negative except for that written in the HPI. Interval History:    Afebrile  BP stable  Sats 93% on RA  WBC 9.5  K 3.0  Pleural fluid culture with klebsiella oxytoca  Blood cultures no growth x 5 days  MRSA swab negative    ROS: Feels much better today. Still with mild chest tightness in right chest but overall much improved. Past Medical History:   Diagnosis Date    Hypertension     Type 2 diabetes mellitus ([facility] [address]       Past Surgical History:   Procedure Laterality Date    HX CHOLECYSTECTOMY      HX ERCP      HX LAP CHOLECYSTECTOMY  08/11/2019      Prior to Admission medications    Medication Sig Start Date End Date Taking? Authorizing Provider   ascorbic acid, vitamin C, (Vitamin C) 500 mg tablet Take 1,000 mg by mouth daily. Yes Provider, Historical   cholecalciferol (Vitamin D3) 25 mcg (1,000 unit) cap Take 1,000 Units by mouth daily. Yes Provider, Historical   OneTouch Delica Plus Lancet 33 gauge misc use 1 LANCET to TEST BLOOD SUGAR DAILY 12/13/22  Yes Mary Lindo MD   valsartan-hydroCHLOROthiazide (DIOVAN-HCT) 160-12.5 mg per tablet take 1 tablet by mouth once daily 10/11/22  Yes Mary Lindo MD   metFORMIN (GLUCOPHAGE) 500 mg tablet take 1 tablet by mouth twice a day with meals 10/11/22   Mary Lindo MD   OneTouch Ultra Test strip use 1 TEST STRIP to TEST BLOOD SUGAR once daily 6/14/22   Mary Lindo MD   lancets misc Check fasting blood sugars daily.  8/22/19   Mary Lindo MD   [facility] ULTRAMINI monitoring kit use as directed 8/22/19   Mary Lindo MD     Current Facility-Administered Medications   Medication Dose Route Frequency    insulin NPH (NOVOLIN N, HUMULIN N) injection 20 Units  20 Units SubCUTAneous ACB&D    valsartan/hydroCHLOROthiazide (DIOVAN HCT) 160/12.5 mg   Oral DAILY    sodium chloride (NS) flush 5-40 mL  5-40 mL IntraVENous Q8H    enoxaparin (LOVENOX) injection 40 mg  40 mg SubCUTAneous DAILY    cefepime (MAXIPIME) 2 g in 0.9% sodium chloride (MBP/ADV) 100 mL MBP  2 g IntraVENous Q8H    lactated Ringers infusion  75 mL/hr IntraVENous CONTINUOUS    insulin lispro (HUMALOG) injection   SubCUTAneous QID WITH MEALS    benzonatate (TESSALON) capsule 100 mg  100 mg Oral TID    guaiFENesin ER (MUCINEX) tablet 1,200 mg  1,200 mg Oral BID     No Known Allergies   Social History     Tobacco Use    Smoking status: Never    Smokeless tobacco: Never   Substance Use Topics    Alcohol use: Yes     Alcohol/week: 1.0 standard drink     Types: 1 Glasses of wine per week     Comment: 1-3 drinks per month      Family History   Problem Relation Age of Onset    Diabetes Mother         type 2    Hypertension Mother     Diabetes Sister     Congenital heart defect Maternal Grandmother           Laboratory: I have personally reviewed the critical care flowsheet and labs. Recent Labs     01/25/23  0145 01/24/23  0122 01/23/23  0127   WBC 9.5 10.5 9.2   HGB 12.0 12.7 11.4*   HCT 35.9 37.9 34.7*    288 229       Recent Labs     01/25/23  0145 01/24/23  0122 01/23/23  0127    137 137   K 3.0* 3.6 3.3*   CL 99 99 100   CO2 30 31 29   * 237* 249*   BUN 8 7 8   CREA 0.60 0.65 0.52*   CA 8.3* 8.8 8.2*   PHOS 3.1 3.3  --    ALB 2.0* 2.0*  --          Objective:     Mode Rate Tidal Volume Pressure FiO2 PEEP                    Vital Signs:     TMAX(24)      Intake/Output:   Last shift:         Last 3 shifts: No intake/output data recorded. RRIOLAST3  Intake/Output Summary (Last 24 hours) at 1/25/2023 0824  Last data filed at 1/25/2023 0407  Gross per 24 hour   Intake 3695 ml   Output --   Net 3695 ml       EXAM:   GENERAL: well developed, awake, alert, HEENT:  PERRL, EOMI, no alar flaring or epistaxis, oral mucosa moist without cyanosis, NECK:  no jugular vein distention, no retractions, no thyromegaly or masses, LUNGS: crackles right base, otherwise clear HEART: Regular rate and rhythm with no MGR; no edema is present, ABDOMEN:  soft with no tenderness, bowel sounds present, EXTREMITIES:  warm with no cyanosis, SKIN:  no jaundice or ecchymosis, and NEUROLOGIC:  alert and oriented, grossly non-focal    Jonnie , NP  [facility]

## 2023-01-25 NOTE — PROGRESS NOTES
Marcelo Wu [facility]  [address] [facility], [place], [address] [place] [address]  [phone]      Hospitalist Progress Note      NAME: Carin Wu   :  1960  MRM:  [id number]    Date of service: 2023  6:50 AM    Patient is a pleasant 58 YOF admitted for a loculated pleural effusion. Assessment and Plan:   Loculated R pleural effusion: Chest CTA - no PE, small right loculated pleural effusion. S/p chest tube , removed . S/P TPA and dornase. On IV cefepime. completed flagyl., vanc  CX + for Klebsiella oxytoca sensitive to levaquin. Not cleared for DC today, can likely DC in 1-2 days per pulm. Appreciated pulmonary evaluation     2. DM 2:  ISS w/ BG checks. Added NPH. Hold home metformin. Check A1C     4. HTN: Cont w/ Diovan-HCTZ 160/12.5mg daily    Reviewed outside notes, personally reviewed labs and images. Discussed with multi D rounds, RN, Pulmonary,  at bedside. Subjective:     Chief Complaint[de-identified] Patient was seen and examined as a follow up for pleural effusion. Chart was reviewed. Chest pain better after chest tube placement and fells well. ROS:  (bold if positive, if negative)    Tolerating PT  Tolerating Diet        Objective:     Last 24hrs VS reviewed since prior progress note.  Most recent are:    Visit Vitals  /60 (BP 1 Location: Left upper arm, BP Patient Position: At rest;Sitting)   Pulse 97   Temp 98.8 °F (37.1 °C)   Resp 18   Ht 5' 2\" (1.575 m)   Wt 94.7 kg (208 lb 12.8 oz)   SpO2 92%   BMI 38.19 kg/m²     SpO2 Readings from Last 6 Encounters:   23 92%   21 98%   20 97%   19 98%   10/07/19 98%   09/15/19 96%    O2 Flow Rate (L/min): 1 l/min     Intake/Output Summary (Last 24 hours) at 2023 1708  Last data filed at 2023 1156  Gross per 24 hour   Intake 3365 ml   Output --   Net 3365 ml          Physical Exam:    Gen:  Well-developed, well-nourished, in no acute distress  HEENT:  Pink conjunctivae, PERRL, hearing intact to voice, moist mucous membranes  Neck:  Supple, without masses, thyroid non-tender  Resp:  No accessory muscle use, rales on the RT, s/p removal of CT  Card:  No murmurs, normal S1, S2 without thrills, bruits or peripheral edema  Abd:  Soft, non-tender, non-distended, normoactive bowel sounds are present, no palpable organomegaly and no detectable hernias  Lymph:  No cervical or inguinal adenopathy  Musc:  No cyanosis or clubbing  Skin:  No rashes or ulcers, skin turgor is good  Neuro:  Cranial nerves are grossly intact, no focal motor weakness, follows commands appropriately  Psych:  Good insight, oriented to person, place and time, alert  __________________________________________________________________  Medications Reviewed: (see below)  Medications:     Current Facility-Administered Medications   Medication Dose Route Frequency    trimethoprim-sulfamethoxazole (BACTRIM DS, SEPTRA DS) 160-800 mg per tablet 1 Tablet  1 Tablet Oral BID WITH MEALS    insulin NPH (NOVOLIN N, HUMULIN N) injection 20 Units  20 Units SubCUTAneous ACB&D    valsartan/hydroCHLOROthiazide (DIOVAN HCT) 160/12.5 mg   Oral DAILY    sodium chloride (NS) flush 5-40 mL  5-40 mL IntraVENous Q8H    sodium chloride (NS) flush 5-40 mL  5-40 mL IntraVENous PRN    acetaminophen (TYLENOL) tablet 650 mg  650 mg Oral Q6H PRN    Or    acetaminophen (TYLENOL) suppository 650 mg  650 mg Rectal Q6H PRN    polyethylene glycol (MIRALAX) packet 17 g  17 g Oral DAILY PRN    ondansetron (ZOFRAN ODT) tablet 4 mg  4 mg Oral Q8H PRN    Or    ondansetron (ZOFRAN) injection 4 mg  4 mg IntraVENous Q6H PRN    enoxaparin (LOVENOX) injection 40 mg  40 mg SubCUTAneous DAILY    lactated Ringers infusion  75 mL/hr IntraVENous CONTINUOUS    insulin lispro (HUMALOG) injection   SubCUTAneous QID WITH MEALS    glucose chewable tablet 16 g  4 Tablet Oral PRN    glucagon (GLUCAGEN) injection 1 mg  1 mg IntraMUSCular PRN    dextrose 10% infusion 0-250 mL  0-250 mL IntraVENous PRN    benzonatate (TESSALON) capsule 100 mg  100 mg Oral TID    guaiFENesin ER (MUCINEX) tablet 1,200 mg  1,200 mg Oral BID        Lab Data Reviewed: (see below)  Lab Review:     Recent Labs     01/25/23  0145 01/24/23 0122 01/23/23 0127   WBC 9.5 10.5 9.2   HGB 12.0 12.7 11.4*   HCT 35.9 37.9 34.7*    288 229       Recent Labs     01/25/23  0145 01/24/23 0122 01/23/23 0127    137 137   K 3.0* 3.6 3.3*   CL 99 99 100   CO2 30 31 29   * 237* 249*   BUN 8 7 8   CREA 0.60 0.65 0.52*   CA 8.3* 8.8 8.2*   PHOS 3.1 3.3  --    ALB 2.0* 2.0*  --        Lab Results   Component Value Date/Time    Glucose (POC) 189 (H) 01/25/2023 04:35 PM    Glucose (POC) 207 (H) 01/25/2023 11:42 AM    Glucose (POC) 203 (H) 01/25/2023 08:27 AM    Glucose (POC) 270 (H) 01/24/2023 09:02 PM    Glucose (POC) 269 (H) 01/24/2023 04:17 PM     No results for input(s): PH, PCO2, PO2, HCO3, FIO2 in the last 72 hours. No results for input(s): INR, INREXT, INREXT in the last 72 hours. All Micro Results       Procedure Component Value Units Date/Time    CULTURE, BODY FLUID [[id number]]  (Abnormal)  (Susceptibility) Collected: 01/20/23 1051    Order Status: Completed Specimen:  Body Fluid from Pleural Fluid Updated: 01/24/23 0716     Special Requests: NO SPECIAL REQUESTS        GRAM STAIN FEW WBCS SEEN         NO ORGANISMS SEEN        Culture result: SCANT Klebsiella oxytoca               Culture performed on Fluid swab specimen          CULTURE, BLOOD, PAIRED [[id number]] Collected: 01/19/23 1900    Order Status: Completed Specimen: Blood Updated: 01/24/23 0504     Special Requests: NO SPECIAL REQUESTS        Culture result: NO GROWTH 5 DAYS       CULTURE, FUNGUS [[id number]] Collected: 01/20/23 1051    Order Status: Completed Specimen: Pleural Fluid Updated: 01/23/23 0732     Special Requests: NO SPECIAL REQUESTS        Culture result: NO FUNGUS ISOLATED 3 DAYS       CULTURE, MRSA [[id number]] Collected: 01/20/23 1426 Order Status: Completed Specimen: Nasal from Nares Updated: 01/21/23 2312     Special Requests: NO SPECIAL REQUESTS        Culture result: MRSA NOT PRESENT               Screening of patient nares for MRSA is for surveillance purposes and, if positive, to facilitate isolation considerations in high risk settings. It is not intended for automatic decolonization interventions per se as regimens are not sufficiently effective to warrant routine use. AFB CULTURE + SMEAR W/RFLX ID FROM CULTURE [[id number]] Collected: 01/20/23 0830    Order Status: Canceled     COVID-19 RAPID TEST [[id number]] Collected: 01/19/23 1740    Order Status: Completed Specimen: Nasopharyngeal Updated: 01/19/23 1811     Specimen source Nasopharyngeal        COVID-19 rapid test Not detected        Comment: Rapid Abbott ID Now       Rapid NAAT:  The specimen is NEGATIVE for SARS-CoV-2, the novel coronavirus associated with COVID-19. Negative results should be treated as presumptive and, if inconsistent with clinical signs and symptoms or necessary for patient management, should be tested with an alternative molecular assay. Negative results do not preclude SARS-CoV-2 infection and should not be used as the sole basis for patient management decisions. This test has been authorized by the FDA under an Emergency Use Authorization (EUA) for use by authorized laboratories. Fact sheet for Healthcare Providers:  http://www.jesus-kyle.phuong/  Fact sheet for Patients: http://www.jesus-kyle.binanette/       Methodology: Isothermal Nucleic Acid Amplification                 I have reviewed notes of prior 24hr. Other pertinent lab: Total time spent with patient: 35 minutes I personally reviewed chart, notes, data and current medications in the medical record. I have personally examined and treated the patient at bedside during this period.                  Care Plan discussed with: Patient, Nursing Staff, and >50% of time spent in counseling and coordination of care    Discussed:  Care Plan    Prophylaxis:  Lovenox    Disposition:  Home w/Family           ___________________________________________________    Attending Physician: Jadiel Joshi MD

## 2023-01-26 VITALS
RESPIRATION RATE: 18 BRPM | TEMPERATURE: 98.3 F | SYSTOLIC BLOOD PRESSURE: 116 MMHG | WEIGHT: 208.8 LBS | HEART RATE: 100 BPM | DIASTOLIC BLOOD PRESSURE: 77 MMHG | BODY MASS INDEX: 38.42 KG/M2 | OXYGEN SATURATION: 92 % | HEIGHT: 62 IN

## 2023-01-26 LAB
ALBUMIN SERPL-MCNC: 2.1 G/DL (ref 3.5–5)
ANION GAP SERPL CALC-SCNC: 8 MMOL/L (ref 5–15)
BASOPHILS # BLD: 0.1 K/UL (ref 0–0.1)
BASOPHILS NFR BLD: 1 % (ref 0–1)
BUN SERPL-MCNC: 6 MG/DL (ref 6–20)
BUN/CREAT SERPL: 10 (ref 12–20)
CALCIUM SERPL-MCNC: 8.4 MG/DL (ref 8.5–10.1)
CHLORIDE SERPL-SCNC: 101 MMOL/L (ref 97–108)
CO2 SERPL-SCNC: 31 MMOL/L (ref 21–32)
CREAT SERPL-MCNC: 0.63 MG/DL (ref 0.55–1.02)
DIFFERENTIAL METHOD BLD: ABNORMAL
EOSINOPHIL # BLD: 0.1 K/UL (ref 0–0.4)
EOSINOPHIL NFR BLD: 1 % (ref 0–7)
ERYTHROCYTE [DISTWIDTH] IN BLOOD BY AUTOMATED COUNT: 13.5 % (ref 11.5–14.5)
GLUCOSE BLD STRIP.AUTO-MCNC: 144 MG/DL (ref 65–117)
GLUCOSE BLD STRIP.AUTO-MCNC: 145 MG/DL (ref 65–117)
GLUCOSE SERPL-MCNC: 141 MG/DL (ref 65–100)
HCT VFR BLD AUTO: 36.7 % (ref 35–47)
HGB BLD-MCNC: 12 G/DL (ref 11.5–16)
IMM GRANULOCYTES # BLD AUTO: 0 K/UL
IMM GRANULOCYTES NFR BLD AUTO: 0 %
LYMPHOCYTES # BLD: 2.1 K/UL (ref 0.8–3.5)
LYMPHOCYTES NFR BLD: 19 % (ref 12–49)
MCH RBC QN AUTO: 28 PG (ref 26–34)
MCHC RBC AUTO-ENTMCNC: 32.7 G/DL (ref 30–36.5)
MCV RBC AUTO: 85.5 FL (ref 80–99)
MONOCYTES # BLD: 0.9 K/UL (ref 0–1)
MONOCYTES NFR BLD: 8 % (ref 5–13)
NEUTS BAND NFR BLD MANUAL: 1 % (ref 0–6)
NEUTS SEG # BLD: 8.1 K/UL (ref 1.8–8)
NEUTS SEG NFR BLD: 70 % (ref 32–75)
NRBC # BLD: 0 K/UL (ref 0–0.01)
NRBC BLD-RTO: 0 PER 100 WBC
PHOSPHATE SERPL-MCNC: 2.8 MG/DL (ref 2.6–4.7)
PLATELET # BLD AUTO: 280 K/UL (ref 150–400)
PMV BLD AUTO: 10.1 FL (ref 8.9–12.9)
POTASSIUM SERPL-SCNC: 3.6 MMOL/L (ref 3.5–5.1)
RBC # BLD AUTO: 4.29 M/UL (ref 3.8–5.2)
RBC MORPH BLD: ABNORMAL
SERVICE CMNT-IMP: ABNORMAL
SERVICE CMNT-IMP: ABNORMAL
SODIUM SERPL-SCNC: 140 MMOL/L (ref 136–145)
WBC # BLD AUTO: 11.3 K/UL (ref 3.6–11)

## 2023-01-26 PROCEDURE — 74011250637 HC RX REV CODE- 250/637: Performed by: INTERNAL MEDICINE

## 2023-01-26 PROCEDURE — 36415 COLL VENOUS BLD VENIPUNCTURE: CPT

## 2023-01-26 PROCEDURE — 74011000250 HC RX REV CODE- 250: Performed by: INTERNAL MEDICINE

## 2023-01-26 PROCEDURE — 74011250636 HC RX REV CODE- 250/636: Performed by: INTERNAL MEDICINE

## 2023-01-26 PROCEDURE — 74011636637 HC RX REV CODE- 636/637: Performed by: INTERNAL MEDICINE

## 2023-01-26 PROCEDURE — 94618 PULMONARY STRESS TESTING: CPT

## 2023-01-26 PROCEDURE — 82962 GLUCOSE BLOOD TEST: CPT

## 2023-01-26 PROCEDURE — 80069 RENAL FUNCTION PANEL: CPT

## 2023-01-26 PROCEDURE — 85025 COMPLETE CBC W/AUTO DIFF WBC: CPT

## 2023-01-26 PROCEDURE — 74011250637 HC RX REV CODE- 250/637: Performed by: NURSE PRACTITIONER

## 2023-01-26 RX ORDER — INSULIN GLARGINE 100 [IU]/ML
30 INJECTION, SOLUTION SUBCUTANEOUS DAILY
Qty: 1 PEN | Refills: 0 | Status: SHIPPED | OUTPATIENT
Start: 2023-01-26 | End: 2023-02-01 | Stop reason: SDUPTHER

## 2023-01-26 RX ORDER — PEN NEEDLE, DIABETIC 31 GX3/16"
1 NEEDLE, DISPOSABLE MISCELLANEOUS DAILY
Qty: 30 PEN NEEDLE | Refills: 0 | Status: SHIPPED | OUTPATIENT
Start: 2023-01-26

## 2023-01-26 RX ORDER — GUAIFENESIN 600 MG/1
600 TABLET, EXTENDED RELEASE ORAL 2 TIMES DAILY
Qty: 28 TABLET | Refills: 0 | Status: SHIPPED | OUTPATIENT
Start: 2023-01-26 | End: 2023-02-09

## 2023-01-26 RX ORDER — SULFAMETHOXAZOLE AND TRIMETHOPRIM 800; 160 MG/1; MG/1
1 TABLET ORAL 2 TIMES DAILY WITH MEALS
Qty: 7 TABLET | Refills: 0 | Status: SHIPPED | OUTPATIENT
Start: 2023-01-26 | End: 2023-01-27 | Stop reason: SDUPTHER

## 2023-01-26 RX ADMIN — Medication 10 ML: at 05:15

## 2023-01-26 RX ADMIN — INSULIN LISPRO 2 UNITS: 100 INJECTION, SOLUTION INTRAVENOUS; SUBCUTANEOUS at 12:05

## 2023-01-26 RX ADMIN — SULFAMETHOXAZOLE AND TRIMETHOPRIM 1 TABLET: 800; 160 TABLET ORAL at 08:58

## 2023-01-26 RX ADMIN — INSULIN LISPRO 2 UNITS: 100 INJECTION, SOLUTION INTRAVENOUS; SUBCUTANEOUS at 08:56

## 2023-01-26 RX ADMIN — GUAIFENESIN 1200 MG: 600 TABLET ORAL at 08:58

## 2023-01-26 RX ADMIN — VALSARTAN: 80 TABLET ORAL at 08:57

## 2023-01-26 RX ADMIN — BENZONATATE 100 MG: 100 CAPSULE ORAL at 08:58

## 2023-01-26 RX ADMIN — ENOXAPARIN SODIUM 40 MG: 100 INJECTION SUBCUTANEOUS at 08:56

## 2023-01-26 RX ADMIN — INSULIN HUMAN 20 UNITS: 100 INJECTION, SUSPENSION SUBCUTANEOUS at 08:56

## 2023-01-26 NOTE — PROGRESS NOTES
PIV removed. Discharge instructions given and gone over with Pt. Opportunity to ask questions given. Instructed Pt on drawing up and giving insulin. Verbal informed and demonstrated. Pt was able to demonstrate back.

## 2023-01-26 NOTE — PROGRESS NOTES
Bedside and Verbal shift change report given to MARIA ELENA Sands  (oncoming nurse) by Ronald Melgar  (offgoing nurse). Report included the following information SBAR, Kardex, Procedure Summary, MAR, and Recent Results.

## 2023-01-26 NOTE — PROGRESS NOTES
01/26/23 1202   Resting (Room Air)   SpO2 92      During Walk (Room Air)   SpO2 90      After Walk   SpO2 92      Comments pt walked very brisky in hallway. returned to bed on room air.    Does the Patient Qualify for Home O2 No

## 2023-01-26 NOTE — DISCHARGE SUMMARY
Hospitalist Discharge Summary     Patient ID:  Arias Vasquez  [id number]  5 y.o.  1960    Admit date: 1/19/2023    Discharge date and time: 1/26/2023    Admission Diagnoses: Loculated pleural effusion [J90]      Discharge Diagnoses: Active Problems:    Loculated pleural effusion (1/19/2023)         Loculated right pleural effusion, s/p pleural drain  Pleural fluid culture positive for Klebsiella oxytoca  Type 2 DM, poorly controlled. Hgb A1c 12.3  HTN  Obesity        Hospital Course:     Arias Vasquez  is a 58 y.o.  female with history of diabetes, hypertension, and obesity presenting with cute onset of right shoulder pain. She had recently had a lower respiratory infection which she attributed to a virus. In the ER, CTA of the chest showed no pulmonary embolus but did visualize a small right loculated pleural effusion. Patient was admitted to the hospitalist service. She was started empirically on vancomycin and Zosyn. Pulmonology was consulted. Pleurx catheter was inserted on 1/20/2023 and was removed on 1/24/2023. Patient did receive tPA and dornase on 1/21/2023. Antibiotics were tailored to cefepime. Pleural fluid culture grew Klebsiella oxytoca. The initial plan was for Levaquin however there was concern for QT prolongation therefore p.o. antibiotics were changed to Bactrim which the patient will continue for the duration of the course. She will need to follow-up with pulmonary in 4 to 6 weeks for repeat chest CT to evaluate for resolution of the pleural effusion. During her stay her blood sugars were elevated so 16. Metformin was held and she was started on NPH twice daily. This was uptitrated to 20 units twice daily. Her hemoglobin A1c was 12 3. We will convert twice daily NPH to Lantus 30 units once daily now. Will need to follow-up with PCP in 1 week for continued diabetes and insulin management. She was discharged in stable and improved condition.           PCP: Libby Suhail Kang MD     Consults: Pulmonary/Intensive care    Condition of patient at discharge: improved    Discharge Exam:     Visit Vitals  /77 (BP 1 Location: Right upper arm, BP Patient Position: At rest)   Pulse 100   Temp 98.3 °F (36.8 °C)   Resp 18   Ht 5' 2\" (1.575 m)   Wt 94.7 kg (208 lb 12.8 oz)   SpO2 91%   BMI 38.19 kg/m²        General:   No acute distress, resting comfortably in bed. Heart:  RRR, No MRG  Lungs:  CTAB. Non-labored breathing. Pleural drain removed from right posterior chest.  Abdomen:  Soft . Nondistended. NTTP. BS present. Extremities:  No edema. Neuro:  Alert and interactive. No focal deficits. Disposition: home    Current Discharge Medication List        START taking these medications    Details   trimethoprim-sulfamethoxazole (BACTRIM DS, SEPTRA DS) 160-800 mg per tablet Take 1 Tablet by mouth two (2) times daily (with meals) for 7 doses. Qty: 7 Tablet, Refills: 0  Start date: 1/26/2023, End date: 1/30/2023      guaiFENesin ER (MUCINEX) 600 mg ER tablet Take 1 Tablet by mouth two (2) times a day for 14 days. Qty: 28 Tablet, Refills: 0  Start date: 1/26/2023, End date: 2/9/2023      insulin glargine (Lantus Solostar U-100 Insulin) 100 unit/mL (3 mL) inpn 30 Units by SubCUTAneous route daily. Qty: 1 Pen, Refills: 0  Start date: 1/26/2023           CONTINUE these medications which have NOT CHANGED    Details   ascorbic acid, vitamin C, (Vitamin C) 500 mg tablet Take 1,000 mg by mouth daily. cholecalciferol (Vitamin D3) 25 mcg (1,000 unit) cap Take 1,000 Units by mouth daily.       !! OneTouch Delica Plus Lancet 33 gauge misc use 1 LANCET to TEST BLOOD SUGAR DAILY  Qty: 900 Lancet, Refills: 1      valsartan-hydroCHLOROthiazide (DIOVAN-HCT) 160-12.5 mg per tablet take 1 tablet by mouth once daily  Qty: 90 Tablet, Refills: 1    Associated Diagnoses: Essential hypertension      OneTouch Ultra Test strip use 1 TEST STRIP to TEST BLOOD SUGAR once daily  Qty: 100 Strip, Refills: 11    Associated Diagnoses: Type 2 diabetes mellitus with complication, without long-term current use of insulin ([address]      ! ! lancets misc Check fasting blood sugars daily. Qty: 1 Each, Refills: 11    Associated Diagnoses: Type 2 diabetes mellitus with complication, without long-term current use of insulin ([address]      ONETOUCH ULTRAMINI monitoring kit use as directed  Qty: 1 Kit, Refills: 0       !! - Potential duplicate medications found. Please discuss with provider. STOP taking these medications       metFORMIN (GLUCOPHAGE) 500 mg tablet Comments:   Reason for Stopping:                I have reviewed discharge instructions with the patient. The patient verbalized understanding.      Approximate time spent in patient care on day of discharge: 45 mins    Signed:  Jeanne Welch MD  1/26/2023  11:46 AM

## 2023-01-26 NOTE — PROGRESS NOTES
Problem: Pain  Goal: *Control of Pain  Outcome: Progressing Towards Goal     Problem: Diabetes Self-Management  Goal: *Disease process and treatment process  Description: Define diabetes and identify own type of diabetes; list 3 options for treating diabetes. Outcome: Progressing Towards Goal  Goal: *Incorporating nutritional management into lifestyle  Description: Describe effect of type, amount and timing of food on blood glucose; list 3 methods for planning meals. Outcome: Progressing Towards Goal  Goal: *Incorporating physical activity into lifestyle  Description: State effect of exercise on blood glucose levels. Outcome: Progressing Towards Goal  Goal: *Developing strategies to promote health/change behavior  Description: Define the ABC's of diabetes; identify appropriate screenings, schedule and personal plan for screenings. Outcome: Progressing Towards Goal  Goal: *Using medications safely  Description: State effect of diabetes medications on diabetes; name diabetes medication taking, action and side effects. Outcome: Progressing Towards Goal  Goal: *Monitoring blood glucose, interpreting and using results  Description: Identify recommended blood glucose targets  and personal targets. Outcome: Progressing Towards Goal  Goal: *Prevention, detection, treatment of acute complications  Description: List symptoms of hyper- and hypoglycemia; describe how to treat low blood sugar and actions for lowering  high blood glucose level. Outcome: Progressing Towards Goal  Goal: *Prevention, detection and treatment of chronic complications  Description: Define the natural course of diabetes and describe the relationship of blood glucose levels to long term complications of diabetes.   Outcome: Progressing Towards Goal  Goal: *Developing strategies to address psychosocial issues  Description: Describe feelings about living with diabetes; identify support needed and support network  Outcome: Progressing Towards Goal  Goal: *Insulin pump training  Outcome: Progressing Towards Goal  Goal: *Sick day guidelines  Outcome: Progressing Towards Goal  Goal: *Patient Specific Goal (EDIT GOAL, INSERT TEXT)  Outcome: Progressing Towards Goal     Problem: Patient Education: Go to Patient Education Activity  Goal: Patient/Family Education  Outcome: Progressing Towards Goal     Problem: Falls - Risk of  Goal: *Absence of Falls  Description: Document Scooby Fall Risk and appropriate interventions in the flowsheet.   Outcome: Progressing Towards Goal  Note: Fall Risk Interventions:                                Problem: Patient Education: Go to Patient Education Activity  Goal: Patient/Family Education  Outcome: Progressing Towards Goal     Problem: Nutrition Deficit  Goal: *Optimize nutritional status  Outcome: Progressing Towards Goal

## 2023-01-26 NOTE — PROGRESS NOTES
[facility]     Name: Paige Solomon MRN: [id number]   : 1960 Hospital: [address]   Date: 2023        Impression Plan   Acute respiratory failure  Hypoxia  Loculated pleural effusion- suspected empyema  Pleuritic chest pain               S/P chest tube for draining and analyzes of fluid 23. S/P TPA and dornase 23. S/p removal 22  Checking walking oximetry prior to discahrge  F/U pleural fluid labs  Wean O2 to keep sats above 90% - currently on RA  Continue Cefepime, can switch to Bactrim at discharge (chosen over Levoflox given elevated QTc)  Will need follow up CT chest in 4-6 weeks  OOB into chair  Okay for discharge today    Ms. Janae Ortega is stable from a pulmonary standpoint. We will sign off and arrange for outpatient follow-up in 2-3 weeks. Please call with questions. Radiology  ( personally reviewed) CT chest reviewed: small right sided pleural effusion with lateral loculations. No inparenchymal infiltrates. ABG No results for input(s): PHI, PO2I, PCO2I in the last 72 hours. Subjective     Cc: back pain    59 yo with PMHx of HTN presenting with increasing pleuritic chest pain and scapular back pain on the right. States she had a URI over a week ago that initially improved, however then developed low grade fevers and scapular back pain. CT chest revealed a small pleural effusion on the right with loculations. Pt denies any previous lung problems and is a lifelong non-smoker. Currently on 3 liters O2 with sats of 96%    23 : Sitting up in bed. Overall feeling a little better today. Minimal out from pleural drain. We discussed TPA/Dornase. Have d/w pharmacy and will plan to administer today when available.      Velasquez Ramirez:; pt seen, the tube drainage post first dose of tpa/dornase this am is now very bloody, tube flushes ok, I decided not to repeat infustion for now to avoid hemothorax, will get repeat ct tonight to assess how much residual fluid there is, suction increased on tube. 1/22/23 : Approx 700ml bloody drainage over last 24hours. CT w/ small residual effusion. Some tightness with deep breath, improving. No sig cough. No dyspnea. Remains on RA. D/W Dr. Fatmata Muse. Will continue pleural drain and hold on TPA/Dornase. Review of Systems:  A comprehensive review of systems was negative except for that written in the HPI. Interval History:    Afebrile  BP stable  Sats 92% on RA  WBC  11.3--increased slightly  K 3.6--better  Pleural fluid culture with klebsiella oxytoca  Blood cultures no growth x 5 days  MRSA swab negative  CXR 1/25: no substantial change in the lung fields compared to prior exam.    ROS: Feels a lot better. Ready to go home. Denies SOB. Using iS. Past Medical History:   Diagnosis Date    Hypertension     Type 2 diabetes mellitus ([address]       Past Surgical History:   Procedure Laterality Date    HX CHOLECYSTECTOMY      HX ERCP      HX LAP CHOLECYSTECTOMY  08/11/2019      Prior to Admission medications    Medication Sig Start Date End Date Taking? Authorizing Provider   ascorbic acid, vitamin C, (Vitamin C) 500 mg tablet Take 1,000 mg by mouth daily. Yes Provider, Historical   cholecalciferol (Vitamin D3) 25 mcg (1,000 unit) cap Take 1,000 Units by mouth daily. Yes Provider, Historical   OneTouch Delica Plus Lancet 33 gauge misc use 1 LANCET to TEST BLOOD SUGAR DAILY 12/13/22  Yes Mirella Mason MD   valsartan-hydroCHLOROthiazide (DIOVAN-HCT) 160-12.5 mg per tablet take 1 tablet by mouth once daily 10/11/22  Yes Mirella Mason MD   metFORMIN (GLUCOPHAGE) 500 mg tablet take 1 tablet by mouth twice a day with meals 10/11/22   Mirella Mason MD   OneTouch Ultra Test strip use 1 TEST STRIP to TEST BLOOD SUGAR once daily 6/14/22   Mirella Mason MD   lancets misc Check fasting blood sugars daily.  8/22/19   Mirella Mason MD   Ilichova 50 monitoring kit use as directed 8/22/19   Mirella Mason MD Current Facility-Administered Medications   Medication Dose Route Frequency    trimethoprim-sulfamethoxazole (BACTRIM DS, SEPTRA DS) 160-800 mg per tablet 1 Tablet  1 Tablet Oral BID WITH MEALS    insulin NPH (NOVOLIN N, HUMULIN N) injection 20 Units  20 Units SubCUTAneous ACB&D    valsartan/hydroCHLOROthiazide (DIOVAN HCT) 160/12.5 mg   Oral DAILY    sodium chloride (NS) flush 5-40 mL  5-40 mL IntraVENous Q8H    enoxaparin (LOVENOX) injection 40 mg  40 mg SubCUTAneous DAILY    insulin lispro (HUMALOG) injection   SubCUTAneous QID WITH MEALS    benzonatate (TESSALON) capsule 100 mg  100 mg Oral TID    guaiFENesin ER (MUCINEX) tablet 1,200 mg  1,200 mg Oral BID     No Known Allergies   Social History     Tobacco Use    Smoking status: Never    Smokeless tobacco: Never   Substance Use Topics    Alcohol use: Yes     Alcohol/week: 1.0 standard drink     Types: 1 Glasses of wine per week     Comment: 1-3 drinks per month      Family History   Problem Relation Age of Onset    Diabetes Mother         type 2    Hypertension Mother     Diabetes Sister     Congenital heart defect Maternal Grandmother           Laboratory: I have personally reviewed the critical care flowsheet and labs. Recent Labs     01/26/23  0138 01/25/23  0145 01/24/23  0122   WBC 11.3* 9.5 10.5   HGB 12.0 12.0 12.7   HCT 36.7 35.9 37.9    266 288       Recent Labs     01/26/23  0138 01/25/23  0145 01/24/23  0122    136 137   K 3.6 3.0* 3.6    99 99   CO2 31 30 31   * 207* 237*   BUN 6 8 7   CREA 0.63 0.60 0.65   CA 8.4* 8.3* 8.8   PHOS 2.8 3.1 3.3   ALB 2.1* 2.0* 2.0*         Objective:     Mode Rate Tidal Volume Pressure FiO2 PEEP                    Vital Signs:     TMAX(24)      Intake/Output:   Last shift:         Last 3 shifts: No intake/output data recorded. [id number]  Intake/Output Summary (Last 24 hours) at 1/26/2023 0957  Last data filed at 1/26/2023 0418  Gross per 24 hour   Intake 740 ml   Output --   Net 740 ml EXAM:   GENERAL: well developed, awake, alert, HEENT:  PERRL, EOMI, no alar flaring or epistaxis, oral mucosa moist without cyanosis, NECK:  no jugular vein distention, no retractions, no thyromegaly or masses, LUNGS: crackles right base, otherwise clear HEART:  Regular rate and rhythm with no MGR; no edema is present, ABDOMEN:  soft with no tenderness, bowel sounds present, EXTREMITIES:  warm with no cyanosis, SKIN:  no jaundice or ecchymosis, and NEUROLOGIC:  alert and oriented, grossly non-focal    Dondra Fetch, NP  [facility]

## 2023-01-26 NOTE — PROGRESS NOTES
1/26/2023   CARE MANAGEMENT NOTE:  CM reviewed EMR. Pt was admitted with loculated right plerual effusion and s/p chest tube; also DM, HTN. Reportedly, pt resides with her  Yanna Ovalles ([phone]). RUR 6%     Transition Plan of Care:  Pulmonary following for medical management  Plan is for pt to return home with her   Outpatient follow up   will transport pt home      CM will continue to follow pt until discharged.   Adarsh

## 2023-01-27 ENCOUNTER — PATIENT OUTREACH (OUTPATIENT)
Dept: CASE MANAGEMENT | Age: 63
End: 2023-01-27

## 2023-01-27 RX ORDER — SULFAMETHOXAZOLE AND TRIMETHOPRIM 800; 160 MG/1; MG/1
1 TABLET ORAL 2 TIMES DAILY WITH MEALS
Qty: 14 TABLET | Refills: 0 | Status: SHIPPED | OUTPATIENT
Start: 2023-01-27 | End: 2023-02-03

## 2023-01-27 NOTE — PROGRESS NOTES
Care Transitions Initial Call    Call within 2 business days of discharge: Yes     Patient: Aaliyah Mayers Patient : 1960 MRN: [id number]    Last Discharge  Street       Date Complaint Diagnosis Description Type Department Provider    23 Shortness of Breath Tachycardia . .. ED to Hosp-Admission (Discharged) (ADMIT) [id number] Alexi Gomez MD; Yesi Tay. .. Was this an external facility discharge? No Discharge Facility: [facility] -    Challenges to be reviewed by the provider   Additional needs identified to be addressed with provider: yes  [facility] - Pleural effusion, s/p pleural drain, fluid + Klebsiella oxytoca  A1C 12.3- Metformin changed to Lantus 20 units- NOT available at pharmacy until this afternoon-will start then.  this am. Watching carbs. Needs EFFIE with pcp. Method of communication with provider : chart routing    Discussed COVID-19 related testing which was available at this time. Test results were negative. Patient informed of results, if available? yes     Advance Care Planning:   Does patient have an Advance Directive: not on file, declined education. Inpatient Readmission Risk score: Unplanned Readmit Risk Score: 4.7    Was this a readmission? no   Patient stated reason for the admission: sob    Patients top risk factors for readmission: medical condition-pleural effusion, fluid pos Klebsiella oxytoca, h/o DM2,HTN. Interventions to address risk factors: Scheduled appointment with PCP-Patient has called for appt with  , Scheduled appointment with Specialist-pulmonary office to call her with appt date. She will check with them on Monday if has not heard anything., and Obtained and reviewed discharge summary and/or continuity of care documents    Care Transition Nurse (CTN) contacted the patient by telephone to perform post hospital discharge assessment. Verified name and  with patient as identifiers.  Provided introduction to self, and explanation of the CTN role. Reports she is waiting to start the Lantus- pharmacy to get it in this afternoon. Watching carbs carefully until then.  this am. No sob, when takes deep breath, does hurt a little in side where chest tube had been. Using incentive spirometer q hour as directed. Plans to start walking with sister, building up gradually. CTN reviewed discharge instructions, medical action plan and red flags with patient who verbalized understanding. Were discharge instructions available to patient? yes. Reviewed appropriate site of care based on symptoms and resources available to patient including: PCP, Specialist, Urgent Care Clinics, When to call 911, and [employer]. Patient given an opportunity to ask questions and does not have any further questions or concerns at this time. The patient agrees to contact the PCP office for questions related to their healthcare. Medication reconciliation was performed with patient, who verbalizes understanding of administration of home medications. Advised obtaining a 90-day supply of all daily and as-needed medications. NOTE- pharmacy did not have the Lantus Solostar- will have for her this afternoon. Referral to Pharm D needed: no     Home Health/Outpatient orders at discharge: [address]: na  Date of initial visit: na    Durable Medical Equipment ordered at discharge: None  [address]: na  Durable Medical Equipment received: na    Was patient discharged with a pulse oximeter? no    Discussed follow-up appointments. If no appointment was previously scheduled, appointment scheduling offered: yes. Is follow up appointment scheduled within 7 days of discharge? Pending, waiting on call back from pcp office. [facility] follow up appointment(s): No future appointments. Non-[facility] follow up appointment(s): pulmonary office to call her with appointment date/time.  If no word by Monday, she will check with them.   Advised if not able to get in to see pcp within week, CTN can request post hospital visit from [facility]. Plan for follow-up call in 5-7 days based on severity of symptoms and risk factors. Plan for next call: symptom management-assess current symptoms, self management-following discharge instructions, follow up appointment-saw pcp for EFFIE, and medication management-taking meds as ordered. CTN provided contact information for future needs. Goals Addressed                   This Visit's Progress     Understands red flags post discharge. 1/27/23 [facility] 1/19-1/26 Pleural effusion, s/p pleural drain, fluid + Klebsiella oxytoca. Reviewed discharge instructions with patient using teach back. Reviewed meds, education provided on new meds, using teach back. Reviewed red flags: sob, wheezing, chest discomfort, fever,nausea,vomiting,diarrhea,weakness, blood sugar under 70 or above 250. EFFIE with pcp, pending. Given info on [facility] as resource. Advised CTN could request post hospital visit with [facility] if unable to see pcp within 7 days. Pulmonary office to call her with appt date/time. Patient will call them on Monday if no word re appt. Given CTN contact info if questions/concerns.   CTN to check back in about 5-7 days,sooner prn.aicha

## 2023-01-30 LAB
BACTERIA SPEC CULT: NORMAL
SERVICE CMNT-IMP: NORMAL

## 2023-02-01 ENCOUNTER — OFFICE VISIT (OUTPATIENT)
Dept: INTERNAL MEDICINE CLINIC | Age: 63
End: 2023-02-01
Payer: COMMERCIAL

## 2023-02-01 VITALS
HEIGHT: 62 IN | BODY MASS INDEX: 34.16 KG/M2 | HEART RATE: 100 BPM | SYSTOLIC BLOOD PRESSURE: 118 MMHG | TEMPERATURE: 97.7 F | RESPIRATION RATE: 14 BRPM | OXYGEN SATURATION: 97 % | WEIGHT: 185.6 LBS | DIASTOLIC BLOOD PRESSURE: 88 MMHG

## 2023-02-01 DIAGNOSIS — J90 PLEURAL EFFUSION: ICD-10-CM

## 2023-02-01 DIAGNOSIS — Z09 HOSPITAL DISCHARGE FOLLOW-UP: Primary | ICD-10-CM

## 2023-02-01 DIAGNOSIS — E11.8 TYPE 2 DIABETES MELLITUS WITH COMPLICATION, WITHOUT LONG-TERM CURRENT USE OF INSULIN (HCC): ICD-10-CM

## 2023-02-01 DIAGNOSIS — I10 ESSENTIAL HYPERTENSION: ICD-10-CM

## 2023-02-01 PROCEDURE — 99214 OFFICE O/P EST MOD 30 MIN: CPT | Performed by: INTERNAL MEDICINE

## 2023-02-01 RX ORDER — INSULIN GLARGINE 100 [IU]/ML
30 INJECTION, SOLUTION SUBCUTANEOUS DAILY
Qty: 3 PEN | Refills: 5 | Status: SHIPPED | OUTPATIENT
Start: 2023-02-01

## 2023-02-01 NOTE — PATIENT INSTRUCTIONS
Long Acting Insulin Titration Scale    Start insulin at 30 units subcutaneously daily. Check fasting (morning) blood sugars daily. Increase dose by 2units every three days that blood sugar is >150. Once blood sugar is <150, increase by one unit every three days until fasting blood glucose is between 80 and 120. If your fasting sugar is repeatedly less than 70, decrease dose by 2units until level stabilizes between . If your blood sugar readings are very irregular, please let us know.

## 2023-02-01 NOTE — PROGRESS NOTES
Charity Michel  Identified pt with two pt identifiers(name and ). Chief Complaint   Patient presents with    Hospital Follow Up     RM18// pt presenting today for WILFREDO CAMEJO from [facility] - for loculated pleural effusion, pt states she is feeling better       1. Have you been to the ER, urgent care clinic since your last visit? Hospitalized since your last visit? NO    2. Have you seen or consulted any other health care providers outside of the [address] [place] [address] since your last visit? Include any pap smears or colon screening. NO      Provider notified of reason for visit, vitals and flowsheets obtained on patients. Patient received paperwork for advance directive during previous visit but has not completed at this time     Reviewed record In preparation for visit, huddled with provider and have obtained necessary documentation      Health Maintenance Due   Topic    Depression Screen     Colorectal Cancer Screening Combo     COVID-19 Vaccine (2 - Pfizer series)    Breast Cancer Screen Mammogram     Eye Exam Retinal or Dilated     Diabetic Alb to Cr ratio (uACR) test     Foot Exam Q1     Lipid Screen     Flu Vaccine (1)       Wt Readings from Last 3 Encounters:   23 185 lb 9.6 oz (84.2 kg)   23 208 lb 12.8 oz (94.7 kg)   21 178 lb (80.7 kg)     Temp Readings from Last 3 Encounters:   23 97.7 °F (36.5 °C) (Oral)   23 98.3 °F (36.8 °C)   21 98.8 °F (37.1 °C) (Oral)     BP Readings from Last 3 Encounters:   23 118/88   23 116/77   21 132/82     Pulse Readings from Last 3 Encounters:   23 100   23 100   21 96     Vitals:    23 0750   BP: 118/88   Pulse: 100   Resp: 14   Temp: 97.7 °F (36.5 °C)   TempSrc: Oral   SpO2: 97%   Weight: 185 lb 9.6 oz (84.2 kg)   Height: 5' 2\" (1.575 m)   PainSc:   0 - No pain         Learning Assessment:  :     No flowsheet data found.     Depression Screening:  :     3 most recent PHQ Screens 2/1/2023   Little interest or pleasure in doing things Not at all   Feeling down, depressed, irritable, or hopeless Not at all   Total Score PHQ 2 0   Trouble falling or staying asleep, or sleeping too much Not at all   Feeling tired or having little energy Not at all   Poor appetite, weight loss, or overeating Not at all   Feeling bad about yourself - or that you are a failure or have let yourself or your family down Not at all   Trouble concentrating on things such as school, work, reading, or watching TV Not at all   Moving or speaking so slowly that other people could have noticed; or the opposite being so fidgety that others notice Not at all   Thoughts of being better off dead, or hurting yourself in some way Not at all   PHQ 9 Score 0   How difficult have these problems made it for you to do your work, take care of your home and get along with others Not difficult at all       Fall Risk Assessment:  :     Fall Risk Assessment, last 12 mths 1/8/2020   Able to walk? Yes   Fall in past 12 months? No       Abuse Screening:  :     Abuse Screening Questionnaire 1/8/2020 10/7/2019 9/11/2019 8/22/2019   Do you ever feel afraid of your partner? N N N N   Are you in a relationship with someone who physically or mentally threatens you? N N N N   Is it safe for you to go home? Y Y Y Y       ADL Screening:  :     No flowsheet data found. Medication reconciliation up to date and corrected with patient at this time.

## 2023-02-01 NOTE — PROGRESS NOTES
Mirlande Castro is a 58 y.o. female who presents today for Hospital Follow Up (RM18// pt presenting today for WILFREDO CAMEJO from [facility] 1/19-1/23 for loculated pleural effusion, pt states she is feeling better)  . She has a history of   Patient Active Problem List   Diagnosis Code    Acute cholecystitis due to biliary calculus K80.00    Hypertension I10    S/P cholecystectomy Z90.49    Acute cholecystitis K81.0    Class 1 obesity in adult E66.9    Choledocholithiasis K80.50    DM (diabetes mellitus), type 2 ([facility] [address] E11.9    Type 2 diabetes with nephropathy ([facility] [address] E11.21    Loculated pleural effusion J90   . Today patient is here for follow-up. Hospitalization: Patient was found to have what appeared to be a loculated pleural effusion on the right side after presenting to the ER on the 19th. She reports that she had an upper respiratory infection leading up to this. Ultimately she did get a chest tube placed and they did attempt tPA. Unfortunately there was some bleeding and they had to stop this. Cultures did grow scant Klebsiella from the thoracentesis. She was transitioned over to Bactrim p.o. and sent home. Chest tube had been pulled at that point. She was only sent home with a couple days of Bactrim and we have extended this out given the fact that she has a residual pleural effusion. Since being home pain and breathing has been much better. Still very mild pain. Pulm follow up is next week. Diabetes Mellitus follow-up- changed to basal insulin. Sugars are 160's. Last hemoglobin a1c   Lab Results   Component Value Date/Time    Hemoglobin A1c 12.3 (H) 01/22/2023 01:13 AM     No components found for: POCA1C, HBA1C POC  medication compliance: compliant all of the time. Diabetic diet compliance: compliant most of the time.    Microalbuminuria:   Lab Results   Component Value Date/Time    Microalb/Creat ratio (ug/mg creat.) 6 04/14/2021 12:11 PM     Hypertension- BP is stable   Hypertension ROS: taking medications as instructed, no medication side effects noted, no TIA's, no chest pain on exertion, no dyspnea on exertion, no swelling of ankles     reports that she has never smoked. She has never used smokeless tobacco.    reports current alcohol use of about 1.0 standard drink per week. BP Readings from Last 2 Encounters:   02/01/23 118/88   01/26/23 116/77         ROS  Review of Systems   Constitutional:  Negative for chills, fever and weight loss. HENT:  Negative for congestion and sore throat. Eyes:  Negative for blurred vision, double vision and photophobia. Respiratory:  Negative for cough, sputum production, shortness of breath and wheezing. Cardiovascular:  Negative for chest pain, palpitations, orthopnea and leg swelling. Gastrointestinal:  Negative for abdominal pain, constipation, diarrhea, heartburn, nausea and vomiting. Genitourinary:  Negative for dysuria, frequency and urgency. Musculoskeletal:  Negative for back pain, joint pain, myalgias and neck pain. Skin:  Negative for rash. Neurological: Negative. Negative for headaches. Endo/Heme/Allergies:  Does not bruise/bleed easily. Psychiatric/Behavioral:  Negative for depression, memory loss and suicidal ideas. There were no vitals taken for this visit. Physical Exam  Constitutional:       General: She is not in acute distress. Appearance: She is well-developed. HENT:      Head: Normocephalic and atraumatic. Neck:      Thyroid: No thyromegaly. Cardiovascular:      Rate and Rhythm: Normal rate and regular rhythm. Heart sounds: No murmur heard. Pulmonary:      Effort: Pulmonary effort is normal.      Breath sounds: Normal breath sounds. No wheezing. Comments: Minimally reduced breath sounds to right lower lobe. Otherwise good air movement. Abdominal:      General: Bowel sounds are normal. There is no distension. Palpations: Abdomen is soft.    Musculoskeletal:      Cervical back: Normal range of motion and neck supple. Skin:     General: Skin is warm and dry. Neurological:      Mental Status: She is alert and oriented to person, place, and time. Cranial Nerves: No cranial nerve deficit. Psychiatric:         Behavior: Behavior normal.         Current Outpatient Medications   Medication Sig    TURMERIC PO Take  by mouth. trimethoprim-sulfamethoxazole (BACTRIM DS, SEPTRA DS) 160-800 mg per tablet Take 1 Tablet by mouth two (2) times daily (with meals) for 7 days. guaiFENesin ER (MUCINEX) 600 mg ER tablet Take 1 Tablet by mouth two (2) times a day for 14 days. insulin glargine (Lantus Solostar U-100 Insulin) 100 unit/mL (3 mL) inpn 30 Units by SubCUTAneous route daily. Insulin Needles, Disposable, (Lite Touch Insulin Pen Needles) 31 gauge x 3/16\" ndle 1 Each by Does Not Apply route daily. ascorbic acid, vitamin C, (VITAMIN C) 500 mg tablet Take 1,000 mg by mouth daily. cholecalciferol (VITAMIN D3) 25 mcg (1,000 unit) cap Take 1,000 Units by mouth daily. OneTouch Delica Plus Lancet 33 gauge misc use 1 LANCET to TEST BLOOD SUGAR DAILY    valsartan-hydroCHLOROthiazide (DIOVAN-HCT) 160-12.5 mg per tablet take 1 tablet by mouth once daily    OneTouch Ultra Test strip use 1 TEST STRIP to TEST BLOOD SUGAR once daily    lancets misc Check fasting blood sugars daily. [employer]2 Rase monitoring kit use as directed     No current facility-administered medications for this visit. Past Medical History:   Diagnosis Date    Hypertension     Type 2 diabetes mellitus ([facility] [address]       Past Surgical History:   Procedure Laterality Date    HX CHOLECYSTECTOMY      HX ERCP      HX LAP CHOLECYSTECTOMY  08/11/2019      Social History     Tobacco Use    Smoking status: Never    Smokeless tobacco: Never   Substance Use Topics    Alcohol use:  Yes     Alcohol/week: 1.0 standard drink     Types: 1 Glasses of wine per week     Comment: 1-3 drinks per month      Family History   Problem Relation Age of Onset    Diabetes Mother         Type II    Hypertension Mother     Diabetes Sister     Congenital heart defect Maternal Grandmother         No Known Allergies     Assessment/Plan  Diagnoses and all orders for this visit:    1. Hospital discharge follow-up-patient to continue Bactrim until pulmonary follow-up. Overall moving in the right direction. We will check renal function and potassium today. Repeat CBC. Otherwise blood sugars heading in the right direction. Patient to continue basal insulin for the time being. Titration scale for basal insulin provided. Patient will see me back in 3 months for a point-of-care A1c. Blood pressures under good control. 2. Pleural effusion    3. Type 2 diabetes mellitus with complication, without long-term current use of insulin (HCC)  -     METABOLIC PANEL, BASIC; Future  -     CBC WITH AUTOMATED DIFF; Future    4. Essential hypertension    Other orders  -     insulin glargine (Lantus Solostar U-100 Insulin) 100 unit/mL (3 mL) inpn; 30 Units by SubCUTAneous route daily. Lambert Lubin MD  2/1/2023    This note was created with the help of speech recognition software Leah Roach) and may contain some 'sound alike' errors.

## 2023-02-02 LAB
BASOPHILS # BLD AUTO: 0.1 X10E3/UL (ref 0–0.2)
BASOPHILS NFR BLD AUTO: 2 %
BUN SERPL-MCNC: 11 MG/DL (ref 8–27)
BUN/CREAT SERPL: 11 (ref 12–28)
CALCIUM SERPL-MCNC: 9 MG/DL (ref 8.7–10.3)
CHLORIDE SERPL-SCNC: 99 MMOL/L (ref 96–106)
CO2 SERPL-SCNC: 19 MMOL/L (ref 20–29)
CREAT SERPL-MCNC: 1 MG/DL (ref 0.57–1)
EGFRCR SERPLBLD CKD-EPI 2021: 64 ML/MIN/1.73
EOSINOPHIL # BLD AUTO: 0.1 X10E3/UL (ref 0–0.4)
EOSINOPHIL NFR BLD AUTO: 2 %
ERYTHROCYTE [DISTWIDTH] IN BLOOD BY AUTOMATED COUNT: 13.7 % (ref 11.7–15.4)
GLUCOSE SERPL-MCNC: 238 MG/DL (ref 70–99)
HCT VFR BLD AUTO: 44.4 % (ref 34–46.6)
HGB BLD-MCNC: 14.8 G/DL (ref 11.1–15.9)
IMM GRANULOCYTES # BLD AUTO: 0.1 X10E3/UL (ref 0–0.1)
IMM GRANULOCYTES NFR BLD AUTO: 1 %
LYMPHOCYTES # BLD AUTO: 1.6 X10E3/UL (ref 0.7–3.1)
LYMPHOCYTES NFR BLD AUTO: 26 %
MCH RBC QN AUTO: 27.9 PG (ref 26.6–33)
MCHC RBC AUTO-ENTMCNC: 33.3 G/DL (ref 31.5–35.7)
MCV RBC AUTO: 84 FL (ref 79–97)
MONOCYTES # BLD AUTO: 0.5 X10E3/UL (ref 0.1–0.9)
MONOCYTES NFR BLD AUTO: 8 %
NEUTROPHILS # BLD AUTO: 3.8 X10E3/UL (ref 1.4–7)
NEUTROPHILS NFR BLD AUTO: 61 %
PLATELET # BLD AUTO: 352 X10E3/UL (ref 150–450)
POTASSIUM SERPL-SCNC: 4.7 MMOL/L (ref 3.5–5.2)
RBC # BLD AUTO: 5.31 X10E6/UL (ref 3.77–5.28)
SODIUM SERPL-SCNC: 137 MMOL/L (ref 134–144)
WBC # BLD AUTO: 6.3 X10E3/UL (ref 3.4–10.8)

## 2023-02-03 ENCOUNTER — PATIENT OUTREACH (OUTPATIENT)
Dept: CASE MANAGEMENT | Age: 63
End: 2023-02-03

## 2023-02-03 NOTE — PROGRESS NOTES
Care Transitions Follow Up Call    Challenges to be reviewed by the provider   Additional needs identified to be addressed with provider: no  none           Method of communication with provider : chart routing    Care Transition Nurse (CTN) contacted the patient by telephone to follow up after admission on 23. Verified name and  with patient as identifiers. Addressed changes since last contact:  continued improvement  Follow up appointment completed? yes. Was follow up appointment scheduled within 7 days of discharge? yes. Advance Care Planning:   Does patient have an Advance Directive:  currently not on file; patient declined education    CTN reviewed discharge instructions, medical action plan and red flags with patient and discussed any barriers to care and/or understanding of plan of care after discharge. Discussed appropriate site of care based on symptoms and resources available to patient including: PCP, Specialist, Urgent Care Clinics, When to call 911, and [employer] Messaging. The patient agrees to contact the PCP office for questions related to their healthcare. Patients top risk factors for readmission: depression and medical condition-pleural effusion    Interventions to address risk factors: Scheduled appointment with Specialist-sees pulmonary  and Education of patient/family/caregiver/guardian to support self-management-continue using incentive spirometer, walking-gradually building up, following discharge instructions. REHABILITATION [facility] follow up appointment(s):   Future Appointments   Date Time Provider Adalid Ortega   2023  8:15 AM Regina Clark MD [facility]     Non-[facility] follow up appointment(s): pulmonary,     CTN provided contact information for future needs. Plan for follow-up call in 5-7 days based on severity of symptoms and risk factors.   Plan for next call: symptom management-assess current symptoms, self management-following discharge instructions, and follow up appointment-saw pulmonary doctor on 2/7. Goals Addressed                   This Visit's Progress     Understands red flags post discharge. 1/27/23 [facility] 1/19-1/26 Pleural effusion, s/p pleural drain, fluid + Klebsiella oxytoca. Reviewed discharge instructions with patient using teach back. Reviewed meds, education provided on new meds, using teach back. Reviewed red flags: sob, wheezing, chest discomfort, fever,nausea,vomiting,diarrhea,weakness, blood sugar under 70 or above 250. EFFIE with pcp, pending. Given info on [employer] as resource. Advised CTN could request post hospital visit with [facility] if unable to see pcp within 7 days. Pulmonary office to call her with appt date/time. Patient will call them on Monday if no word re appt. Given CTN contact info if questions/concerns. CTN to check back in about 5-7 days,sooner prn.mbt  2/3/23  Reports she is feeling much better. Saw pcp on 2/1. He was pleased with her lungs. Labs were done, all good. Sees pulmonary on 2/7. Very little sob at this time. No red flags noted. Advised to continue current POC. CTN to check back in about 5-7 days. mbt

## 2023-02-10 ENCOUNTER — TRANSCRIBE ORDER (OUTPATIENT)
Dept: SCHEDULING | Age: 63
End: 2023-02-10

## 2023-02-10 DIAGNOSIS — J90 PLEURAL EFFUSION: Primary | ICD-10-CM

## 2023-02-14 ENCOUNTER — PATIENT OUTREACH (OUTPATIENT)
Dept: CASE MANAGEMENT | Age: 63
End: 2023-02-14

## 2023-02-14 NOTE — PROGRESS NOTES
Care Transitions Follow Up Call    Challenges to be reviewed by the provider   Additional needs identified to be addressed with provider: no  none           Method of communication with provider : chart routing    Care Transition Nurse (CTN) contacted the patient by telephone to follow up after admission on 23. Verified name and  with patient as identifiers. Addressed changes since last contact:  continued improvement. Follow up appointment completed? yes. Was follow up appointment scheduled within 7 days of discharge? yes. Advance Care Planning:   Does patient have an Advance Directive:  currently not on file; patient declined education    CTN reviewed discharge instructions, medical action plan and red flags with patient and discussed any barriers to care and/or understanding of plan of care after discharge. Discussed appropriate site of care based on symptoms and resources available to patient including: PCP, Specialist, Urgent Care Clinics, When to call 911, and [employer] Messaging. The patient agrees to contact the PCP office for questions related to their healthcare. Patients top risk factors for readmission: medical condition-pleural effusion    Interventions to address risk factors: Education of patient/family/caregiver/guardian to support self-management-continue to f/u with pcp and specialists, f/u CXR as ordered, meds as ordered. [facility] follow up appointment(s):   Future Appointments   Date Time Provider Adalid Ortega   3/20/2023  8:00 AM [facility] CT [facility]   2023  8:15 AM Shobha Souza MD [facility]     Non-[facility] follow up appointment(s): ,pulmonary,  and f/u pending. CTN provided contact information for future needs. Plan for follow-up call in 5-7 days based on severity of symptoms and risk factors. Plan for next call: symptom management-assess current symptoms and self management-following discharge instructions.        Goals Addressed This Visit's Progress     Understands red flags post discharge. 1/27/23 [facility] 1/19-1/26 Pleural effusion, s/p pleural drain, fluid + Klebsiella oxytoca. Reviewed discharge instructions with patient using teach back. Reviewed meds, education provided on new meds, using teach back. Reviewed red flags: sob, wheezing, chest discomfort, fever,nausea,vomiting,diarrhea,weakness, blood sugar under 70 or above 250. EFFIE with pcp, pending. Given info on [employer] as resource. Advised CTN could request post hospital visit with [facility] if unable to see pcp within 7 days. Pulmonary office to call her with appt date/time. Patient will call them on Monday if no word re appt. Given CTN contact info if questions/concerns. CTN to check back in about 5-7 days,sooner prn.mbt  2/3/23  Reports she is feeling much better. Saw pcp on 2/1. He was pleased with her lungs. Labs were done, all good. Sees pulmonary on 2/7. Very little sob at this time. No red flags noted. Advised to continue current POC. CTN to check back in about 5-7 days. mbt  2/14/23  Reports she feels great. Saw pcp, saw pulmonary, . Got good report from pulmonary. Will go back in March to have f/u chest CT. No sob. Blood sugar has improved, averaging about 137. No red flags noted. Advised continue current POC. CTN to check back in about 5-7 days. mbt

## 2023-02-20 ENCOUNTER — PATIENT MESSAGE (OUTPATIENT)
Dept: INTERNAL MEDICINE CLINIC | Age: 63
End: 2023-02-20

## 2023-02-20 RX ORDER — PEN NEEDLE, DIABETIC 31 GX3/16"
1 NEEDLE, DISPOSABLE MISCELLANEOUS DAILY
Qty: 100 PEN NEEDLE | Refills: 1 | Status: SHIPPED | OUTPATIENT
Start: 2023-02-20 | End: 2023-03-22

## 2023-02-20 NOTE — TELEPHONE ENCOUNTER
From: Trevor Casper  To: Iam Maldonado MD  Sent: 2/20/2023 1:16 PM EST  Subject: Insulin Needles    Hate to bother you folks but having problem with pharmacy about insulin and needle refills. Original Rx from Dr Baiely Garcia was written for 9 pens. They partially filled it and gave me 3 pens stating there were 6 units left. I assumed that meant 6 pens. Pharmacist said no that it was only good for 2 pens (?). Also only gave me 30 needles but a few days I need to use 2 needles to get the right units of insulin (finish one pen and start new pen). They do have your new Rx for insulin and will go off of that. Pharmacist said to send in Rx for 1 box (100 needles) for 30 day supply. No refill provided and said that Rx was not written correctly either. Very confusing for me but I need a Rx for needles sent in ASAP. Thanks and sorry for confusion.  Dr Bailey Garcia was the same Dr that sent Rx for 7 doses rather than 7 days worth of Bactrim bid. ([facility]!!)  Sara Peres!! Bereket Powers

## 2023-02-22 ENCOUNTER — PATIENT OUTREACH (OUTPATIENT)
Dept: CASE MANAGEMENT | Age: 63
End: 2023-02-22

## 2023-02-22 NOTE — PROGRESS NOTES
Care Transitions Follow Up Call    Challenges to be reviewed by the provider   Additional needs identified to be addressed with provider: no  none           Method of communication with provider : chart routing    Care Transition Nurse (CTN) contacted the patient by telephone to follow up after admission on 23. Verified name and  with patient as identifiers. Addressed changes since last contact:  continued improvement  Follow up appointment completed? yes. Was follow up appointment scheduled within 7 days of discharge? yes. Advance Care Planning:   Does patient have an Advance Directive:  currently not on file; patient declined education    CTN reviewed discharge instructions, medical action plan and red flags with patient and discussed any barriers to care and/or understanding of plan of care after discharge. Discussed appropriate site of care based on symptoms and resources available to patient including: PCP, Specialist, Urgent Care Clinics, When to call 911, and [employer] Messaging. The patient agrees to contact the PCP office for questions related to their healthcare. Patients top risk factors for readmission: medical condition-s/p pleural effusion and pos Klebsiela oxytoca. Interventions to address risk factors: Education of patient/family/caregiver/guardian to support self-management-follow discharge instructions, continue to f/u with pcp and specialists. Use incentive spirometer, exercise with gradual build up. [address] follow up appointment(s):   Future Appointments   Date Time Provider Adalid Ortega   3/20/2023  8:00 AM [facility] CT 2 SFCT [facility]   2023  8:15 AM Heather Strickland MD [facility]     Non-[facility] follow up appointment(s): na    CTN provided contact information for future needs. Plan for follow-up call in 5-7 days based on severity of symptoms and risk factors.   Plan for next call: symptom management-assess current symptoms and self management-following discharge instructions. Goals Addressed                   This Visit's Progress     Understands red flags post discharge. 1/27/23 [facility] 1/19-1/26 Pleural effusion, s/p pleural drain, fluid + Klebsiella oxytoca. Reviewed discharge instructions with patient using teach back. Reviewed meds, education provided on new meds, using teach back. Reviewed red flags: sob, wheezing, chest discomfort, fever,nausea,vomiting,diarrhea,weakness, blood sugar under 70 or above 250. EFFIE with pcp, pending. Given info on [employer] as resource. Advised CTN could request post hospital visit with [facility] if unable to see pcp within 7 days. Pulmonary office to call her with appt date/time. Patient will call them on Monday if no word re appt. Given CTN contact info if questions/concerns. CTN to check back in about 5-7 days,sooner prn.mbt  2/3/23  Reports she is feeling much better. Saw pcp on 2/1. He was pleased with her lungs. Labs were done, all good. Sees pulmonary on 2/7. Very little sob at this time. No red flags noted. Advised to continue current POC. CTN to check back in about 5-7 days. mbt  2/14/23  Reports she feels great. Saw pcp, saw pulmonary, . Got good report from pulmonary. Will go back in March to have f/u chest CT. No sob. Blood sugar has improved, averaging about 137. No red flags noted. Advised continue current POC. CTN to check back in about 5-7 days. mbt  2/22/23  Reports she is very well. No sob, no shoulder pain. Saw pulmonary 2/7. BS has been good, runs in the 130's. No red flags noted. To have chest CT f/u in March. Advised patient will check back for final EFFIE call in about 5-7 days. mbt

## 2023-02-24 RX ORDER — PEN NEEDLE, DIABETIC 31 GX3/16"
2 NEEDLE, DISPOSABLE MISCELLANEOUS 3 TIMES DAILY
Qty: 200 PEN NEEDLE | Refills: 4 | Status: SHIPPED | OUTPATIENT
Start: 2023-02-24 | End: 2023-03-26

## 2023-02-24 RX ORDER — PEN NEEDLE, DIABETIC 31 GX3/16"
2 NEEDLE, DISPOSABLE MISCELLANEOUS 3 TIMES DAILY
Qty: 200 PEN NEEDLE | Refills: 4 | Status: SHIPPED | OUTPATIENT
Start: 2023-02-24 | End: 2023-02-24 | Stop reason: SDUPTHER

## 2023-03-01 ENCOUNTER — PATIENT OUTREACH (OUTPATIENT)
Dept: CASE MANAGEMENT | Age: 63
End: 2023-03-01

## 2023-03-01 NOTE — PROGRESS NOTES
Patient has graduated from the Transitions of Care Coordination  program on 3/1/23. Patient/family has the ability to self-manage at this time Care management goals have been completed. Patient was not referred to the [facility] team for further management. Goals Addressed                   This Visit's Progress     COMPLETED: Understands red flags post discharge. 1/27/23 [facility] 1/19-1/26 Pleural effusion, s/p pleural drain, fluid + Klebsiella oxytoca. Reviewed discharge instructions with patient using teach back. Reviewed meds, education provided on new meds, using teach back. Reviewed red flags: sob, wheezing, chest discomfort, fever,nausea,vomiting,diarrhea,weakness, blood sugar under 70 or above 250. EFFIE with pcp, pending. Given info on Martin Garcia as resource. Advised CTN could request post hospital visit with [facility] if unable to see pcp within 7 days. Pulmonary office to call her with appt date/time. Patient will call them on Monday if no word re appt. Given CTN contact info if questions/concerns. CTN to check back in about 5-7 days,sooner prn.mbt  2/3/23  Reports she is feeling much better. Saw pcp on 2/1. He was pleased with her lungs. Labs were done, all good. Sees pulmonary on 2/7. Very little sob at this time. No red flags noted. Advised to continue current POC. CTN to check back in about 5-7 days. mbt  2/14/23  Reports she feels great. Saw pcp, saw pulmonary, . Got good report from pulmonary. Will go back in March to have f/u chest CT. No sob. Blood sugar has improved, averaging about 137. No red flags noted. Advised continue current POC. CTN to check back in about 5-7 days. mbt  2/22/23  Reports she is very well. No sob, no shoulder pain. Saw pulmonary 2/7. BS has been good, runs in the 130's. No red flags noted. To have chest CT f/u in March. Advised patient will check back for final EFFIE call in about 5-7 days. mbt  3/1/23  Reports she feels great.   No sob, no shoulder pain. No concerning symptoms at all. Blood sugar very good, running in the 135 range. Saw pcp and pulmonary. Goes back at end of March for repeat Chest CT. Advised to continue current POC. F/u as recommended. Wished her well.mbt              Patient has Care Transition Nurse's contact information for any further questions, concerns, or needs.   Patients upcoming visits:    Future Appointments   Date Time Provider Adalid Ortega   3/20/2023  8:00 AM [facility] CT 2 SFMRCT [facility]   5/12/2023  8:15 AM Rebecca Carter MD [facility]

## 2023-04-05 RX ORDER — VALSARTAN AND HYDROCHLOROTHIAZIDE 160; 12.5 MG/1; MG/1
TABLET, FILM COATED ORAL
Qty: 90 TABLET | Refills: 1 | Status: SHIPPED
Start: 2023-04-05

## 2023-04-06 ENCOUNTER — HOSPITAL ENCOUNTER (OUTPATIENT)
Dept: CT IMAGING | Age: 63
End: 2023-04-06
Attending: INTERNAL MEDICINE
Payer: COMMERCIAL

## 2023-04-06 PROCEDURE — 71250 CT THORAX DX C-: CPT

## 2023-04-22 DIAGNOSIS — J90 PLEURAL EFFUSION: Primary | ICD-10-CM

## 2023-08-21 RX ORDER — INSULIN GLARGINE 100 [IU]/ML
INJECTION, SOLUTION SUBCUTANEOUS
Qty: 9 ML | Refills: 1 | Status: SHIPPED | OUTPATIENT
Start: 2023-08-21

## 2023-09-06 RX ORDER — BLOOD SUGAR DIAGNOSTIC
STRIP MISCELLANEOUS
Qty: 100 STRIP | Refills: 2 | Status: SHIPPED | OUTPATIENT
Start: 2023-09-06

## 2023-09-24 NOTE — PROGRESS NOTES
Porter Law is a 61 y.o. female who presents today for Annual Exam  .      She has a history of   Patient Active Problem List   Diagnosis    Type 2 diabetes with nephropathy ([address])    S/P cholecystectomy    Class 1 obesity in adult    Acute cholecystitis    Hypertension    Choledocholithiasis    DM (diabetes mellitus), type 2 ([address])    Acute cholecystitis due to biliary calculus    Loculated pleural effusion   . Today patient is here for CPE. Pleural effusion resolved on CT in April. Does not need follow-up with pulmonary    Diabetes Mellitus follow-up- On basal insulin and suggested titration in Feb. Since taking 30units. BG have been mildly elevated in the mid 100s. We discussed that we can titrate up her basal insulin  Last hemoglobin a1c   Hemoglobin A1C   Date Value Ref Range Status   01/22/2023 12.3 (H) 4.0 - 5.6 % Final     Comment:     NEW METHOD  PLEASE NOTE NEW REFERENCE RANGE  (NOTE)  HbA1C Interpretive Ranges  <5.7              Normal  5.7 - 6.4         Consider Prediabetes  >6.5              Consider Diabetes       No components found for: \"POCA1C\", \"HBA1C POC\"  medication compliance: compliant all of the time. Diabetic diet compliance: compliant most of the time. Microalbuminuria: No results found for: \"MCA2\", \"MCAU\", \"MCAU2\"    Hypertension- On combo. Still, high on repeat. Has not taken her medications in 2 days  Hypertension ROS: taking medications as instructed, no medication side effects noted, no TIA's, no chest pain on exertion, no dyspnea on exertion, no swelling of ankles     reports that she has never smoked. She has never used smokeless tobacco.    reports current alcohol use of about 1.0 standard drink of alcohol per week. BP Readings from Last 2 Encounters:   09/25/23 (!) 145/90   02/01/23 118/88       Health maintenance hx includes:  Exercise: moderately active. Form of exercise: Walking   Diet: generally follows a low fat low cholesterol diet.   Social: Retired from
No

## 2023-09-25 ENCOUNTER — OFFICE VISIT (OUTPATIENT)
Age: 63
End: 2023-09-25
Payer: COMMERCIAL

## 2023-09-25 VITALS
RESPIRATION RATE: 12 BRPM | BODY MASS INDEX: 36.58 KG/M2 | HEART RATE: 97 BPM | SYSTOLIC BLOOD PRESSURE: 145 MMHG | TEMPERATURE: 98.3 F | DIASTOLIC BLOOD PRESSURE: 90 MMHG | WEIGHT: 198.8 LBS | HEIGHT: 62 IN | OXYGEN SATURATION: 96 %

## 2023-09-25 DIAGNOSIS — Z90.49 S/P CHOLECYSTECTOMY: ICD-10-CM

## 2023-09-25 DIAGNOSIS — J90 LOCULATED PLEURAL EFFUSION: ICD-10-CM

## 2023-09-25 DIAGNOSIS — I10 HYPERTENSION, UNSPECIFIED TYPE: ICD-10-CM

## 2023-09-25 DIAGNOSIS — Z00.00 WELLNESS EXAMINATION: Primary | ICD-10-CM

## 2023-09-25 DIAGNOSIS — E11.21 TYPE 2 DIABETES WITH NEPHROPATHY (HCC): ICD-10-CM

## 2023-09-25 DIAGNOSIS — Z12.31 BREAST CANCER SCREENING BY MAMMOGRAM: ICD-10-CM

## 2023-09-25 DIAGNOSIS — E78.5 HYPERLIPIDEMIA, UNSPECIFIED HYPERLIPIDEMIA TYPE: ICD-10-CM

## 2023-09-25 PROCEDURE — 3080F DIAST BP >= 90 MM HG: CPT | Performed by: INTERNAL MEDICINE

## 2023-09-25 PROCEDURE — 3077F SYST BP >= 140 MM HG: CPT | Performed by: INTERNAL MEDICINE

## 2023-09-25 PROCEDURE — 99396 PREV VISIT EST AGE 40-64: CPT | Performed by: INTERNAL MEDICINE

## 2023-09-25 ASSESSMENT — ENCOUNTER SYMPTOMS
CHEST TIGHTNESS: 0
ABDOMINAL PAIN: 0
SHORTNESS OF BREATH: 0
DIARRHEA: 0
CONSTIPATION: 0
BACK PAIN: 0

## 2023-09-25 ASSESSMENT — PATIENT HEALTH QUESTIONNAIRE - PHQ9
2. FEELING DOWN, DEPRESSED OR HOPELESS: 0
SUM OF ALL RESPONSES TO PHQ QUESTIONS 1-9: 0
SUM OF ALL RESPONSES TO PHQ QUESTIONS 1-9: 0
SUM OF ALL RESPONSES TO PHQ9 QUESTIONS 1 & 2: 0
SUM OF ALL RESPONSES TO PHQ QUESTIONS 1-9: 0
SUM OF ALL RESPONSES TO PHQ QUESTIONS 1-9: 0
1. LITTLE INTEREST OR PLEASURE IN DOING THINGS: 0

## 2023-09-26 LAB
ALBUMIN SERPL-MCNC: 4.5 G/DL (ref 3.9–4.9)
ALBUMIN/CREAT UR: 20 MG/G CREAT (ref 0–29)
ALBUMIN/GLOB SERPL: 1.5 {RATIO} (ref 1.2–2.2)
ALP SERPL-CCNC: 107 IU/L (ref 44–121)
ALT SERPL-CCNC: 45 IU/L (ref 0–32)
AST SERPL-CCNC: 48 IU/L (ref 0–40)
BASOPHILS # BLD AUTO: 0.1 X10E3/UL (ref 0–0.2)
BASOPHILS NFR BLD AUTO: 1 %
BILIRUB SERPL-MCNC: 0.9 MG/DL (ref 0–1.2)
BUN SERPL-MCNC: 12 MG/DL (ref 8–27)
BUN/CREAT SERPL: 15 (ref 12–28)
CALCIUM SERPL-MCNC: 9.8 MG/DL (ref 8.7–10.3)
CHLORIDE SERPL-SCNC: 103 MMOL/L (ref 96–106)
CHOLEST SERPL-MCNC: 179 MG/DL (ref 100–199)
CO2 SERPL-SCNC: 23 MMOL/L (ref 20–29)
CREAT SERPL-MCNC: 0.8 MG/DL (ref 0.57–1)
CREAT UR-MCNC: 42.1 MG/DL
EGFRCR SERPLBLD CKD-EPI 2021: 83 ML/MIN/1.73
EOSINOPHIL # BLD AUTO: 0.2 X10E3/UL (ref 0–0.4)
EOSINOPHIL NFR BLD AUTO: 2 %
ERYTHROCYTE [DISTWIDTH] IN BLOOD BY AUTOMATED COUNT: 13.9 % (ref 11.7–15.4)
GLOBULIN SER CALC-MCNC: 3 G/DL (ref 1.5–4.5)
GLUCOSE SERPL-MCNC: 133 MG/DL (ref 70–99)
HBA1C MFR BLD: 8.6 % (ref 4.8–5.6)
HCT VFR BLD AUTO: 46.8 % (ref 34–46.6)
HDLC SERPL-MCNC: 51 MG/DL
HGB BLD-MCNC: 15.7 G/DL (ref 11.1–15.9)
IMM GRANULOCYTES # BLD AUTO: 0 X10E3/UL (ref 0–0.1)
IMM GRANULOCYTES NFR BLD AUTO: 0 %
IMP & REVIEW OF LAB RESULTS: NORMAL
LDLC SERPL CALC-MCNC: 109 MG/DL (ref 0–99)
LYMPHOCYTES # BLD AUTO: 2.1 X10E3/UL (ref 0.7–3.1)
LYMPHOCYTES NFR BLD AUTO: 30 %
MCH RBC QN AUTO: 29.1 PG (ref 26.6–33)
MCHC RBC AUTO-ENTMCNC: 33.5 G/DL (ref 31.5–35.7)
MCV RBC AUTO: 87 FL (ref 79–97)
MICROALBUMIN UR-MCNC: 8.5 UG/ML
MONOCYTES # BLD AUTO: 0.5 X10E3/UL (ref 0.1–0.9)
MONOCYTES NFR BLD AUTO: 7 %
NEUTROPHILS # BLD AUTO: 4.1 X10E3/UL (ref 1.4–7)
NEUTROPHILS NFR BLD AUTO: 60 %
PLATELET # BLD AUTO: 192 X10E3/UL (ref 150–450)
POTASSIUM SERPL-SCNC: 4.1 MMOL/L (ref 3.5–5.2)
PROT SERPL-MCNC: 7.5 G/DL (ref 6–8.5)
RBC # BLD AUTO: 5.39 X10E6/UL (ref 3.77–5.28)
SODIUM SERPL-SCNC: 141 MMOL/L (ref 134–144)
TRIGL SERPL-MCNC: 102 MG/DL (ref 0–149)
VLDLC SERPL CALC-MCNC: 19 MG/DL (ref 5–40)
WBC # BLD AUTO: 6.9 X10E3/UL (ref 3.4–10.8)

## 2023-09-27 ENCOUNTER — HOSPITAL ENCOUNTER (OUTPATIENT)
Facility: HOSPITAL | Age: 63
Discharge: HOME | End: 2023-09-30
Payer: COMMERCIAL

## 2023-09-27 ENCOUNTER — TRANSCRIBE ORDERS (OUTPATIENT)
Facility: HOSPITAL | Age: 63
End: 2023-09-27

## 2023-09-27 VITALS — HEIGHT: 62 IN | BODY MASS INDEX: 36.44 KG/M2 | WEIGHT: 198 LBS

## 2023-09-27 DIAGNOSIS — Z12.31 OTHER SCREENING MAMMOGRAM: ICD-10-CM

## 2023-09-27 DIAGNOSIS — Z12.31 OTHER SCREENING MAMMOGRAM: Primary | ICD-10-CM

## 2023-09-27 PROCEDURE — 77063 BREAST TOMOSYNTHESIS BI: CPT

## 2023-10-02 RX ORDER — VALSARTAN AND HYDROCHLOROTHIAZIDE 160; 12.5 MG/1; MG/1
1 TABLET, FILM COATED ORAL DAILY
Qty: 90 TABLET | Refills: 1 | Status: SHIPPED | OUTPATIENT
Start: 2023-10-02

## 2023-10-06 RX ORDER — INSULIN GLARGINE 100 [IU]/ML
INJECTION, SOLUTION SUBCUTANEOUS
Qty: 9 ML | Refills: 1 | Status: SHIPPED | OUTPATIENT
Start: 2023-10-06

## 2023-12-04 RX ORDER — INSULIN GLARGINE 100 [IU]/ML
INJECTION, SOLUTION SUBCUTANEOUS
Qty: 9 ML | Refills: 1 | Status: SHIPPED | OUTPATIENT
Start: 2023-12-04

## 2023-12-15 RX ORDER — LANCETS 33 GAUGE
EACH MISCELLANEOUS
Qty: 200 EACH | Refills: 2 | Status: SHIPPED | OUTPATIENT
Start: 2023-12-15

## 2024-03-25 SDOH — ECONOMIC STABILITY: HOUSING INSECURITY
IN THE LAST 12 MONTHS, WAS THERE A TIME WHEN YOU DID NOT HAVE A STEADY PLACE TO SLEEP OR SLEPT IN A SHELTER (INCLUDING NOW)?: NO

## 2024-03-25 SDOH — ECONOMIC STABILITY: FOOD INSECURITY: WITHIN THE PAST 12 MONTHS, THE FOOD YOU BOUGHT JUST DIDN'T LAST AND YOU DIDN'T HAVE MONEY TO GET MORE.: NEVER TRUE

## 2024-03-25 SDOH — ECONOMIC STABILITY: FOOD INSECURITY: WITHIN THE PAST 12 MONTHS, YOU WORRIED THAT YOUR FOOD WOULD RUN OUT BEFORE YOU GOT MONEY TO BUY MORE.: NEVER TRUE

## 2024-03-25 SDOH — ECONOMIC STABILITY: TRANSPORTATION INSECURITY
IN THE PAST 12 MONTHS, HAS LACK OF TRANSPORTATION KEPT YOU FROM MEETINGS, WORK, OR FROM GETTING THINGS NEEDED FOR DAILY LIVING?: NO

## 2024-03-25 SDOH — ECONOMIC STABILITY: INCOME INSECURITY: HOW HARD IS IT FOR YOU TO PAY FOR THE VERY BASICS LIKE FOOD, HOUSING, MEDICAL CARE, AND HEATING?: NOT HARD AT ALL

## 2024-03-26 ENCOUNTER — OFFICE VISIT (OUTPATIENT)
Age: 64
End: 2024-03-26
Payer: COMMERCIAL

## 2024-03-26 VITALS
HEIGHT: 62 IN | RESPIRATION RATE: 16 BRPM | DIASTOLIC BLOOD PRESSURE: 88 MMHG | OXYGEN SATURATION: 96 % | WEIGHT: 203 LBS | HEART RATE: 96 BPM | TEMPERATURE: 97.5 F | BODY MASS INDEX: 37.36 KG/M2 | SYSTOLIC BLOOD PRESSURE: 138 MMHG

## 2024-03-26 DIAGNOSIS — E11.21 TYPE 2 DIABETES WITH NEPHROPATHY (HCC): ICD-10-CM

## 2024-03-26 DIAGNOSIS — I10 HYPERTENSION, UNSPECIFIED TYPE: Primary | ICD-10-CM

## 2024-03-26 DIAGNOSIS — I10 HYPERTENSION, UNSPECIFIED TYPE: ICD-10-CM

## 2024-03-26 DIAGNOSIS — E66.9 CLASS 1 OBESITY IN ADULT, UNSPECIFIED BMI, UNSPECIFIED OBESITY TYPE, UNSPECIFIED WHETHER SERIOUS COMORBIDITY PRESENT: ICD-10-CM

## 2024-03-26 PROCEDURE — 3075F SYST BP GE 130 - 139MM HG: CPT | Performed by: INTERNAL MEDICINE

## 2024-03-26 PROCEDURE — 99214 OFFICE O/P EST MOD 30 MIN: CPT | Performed by: INTERNAL MEDICINE

## 2024-03-26 PROCEDURE — 3079F DIAST BP 80-89 MM HG: CPT | Performed by: INTERNAL MEDICINE

## 2024-03-26 RX ORDER — MAGNESIUM OXIDE/MAG AA CHELATE 300 MG
CAPSULE ORAL DAILY
COMMUNITY

## 2024-03-26 RX ORDER — INSULIN GLARGINE 100 [IU]/ML
32 INJECTION, SOLUTION SUBCUTANEOUS DAILY
Qty: 9 ML | Refills: 1 | Status: SHIPPED | OUTPATIENT
Start: 2024-03-26

## 2024-03-26 RX ORDER — MULTIVIT WITH MINERALS/LUTEIN
1000 TABLET ORAL DAILY
COMMUNITY

## 2024-03-26 RX ORDER — VALSARTAN AND HYDROCHLOROTHIAZIDE 320; 12.5 MG/1; MG/1
1 TABLET, FILM COATED ORAL DAILY
Qty: 90 TABLET | Refills: 1 | Status: SHIPPED | OUTPATIENT
Start: 2024-03-26

## 2024-03-26 RX ORDER — PEN NEEDLE, DIABETIC 30 GX3/16"
1 NEEDLE, DISPOSABLE MISCELLANEOUS DAILY
Qty: 100 EACH | Refills: 1 | Status: SHIPPED | OUTPATIENT
Start: 2024-03-26

## 2024-03-26 ASSESSMENT — PATIENT HEALTH QUESTIONNAIRE - PHQ9
SUM OF ALL RESPONSES TO PHQ9 QUESTIONS 1 & 2: 0
SUM OF ALL RESPONSES TO PHQ QUESTIONS 1-9: 0
SUM OF ALL RESPONSES TO PHQ QUESTIONS 1-9: 0
1. LITTLE INTEREST OR PLEASURE IN DOING THINGS: NOT AT ALL
2. FEELING DOWN, DEPRESSED OR HOPELESS: NOT AT ALL
SUM OF ALL RESPONSES TO PHQ QUESTIONS 1-9: 0
SUM OF ALL RESPONSES TO PHQ QUESTIONS 1-9: 0

## 2024-03-26 ASSESSMENT — ENCOUNTER SYMPTOMS
DIARRHEA: 0
SHORTNESS OF BREATH: 0
ABDOMINAL PAIN: 0
BACK PAIN: 0
CHEST TIGHTNESS: 0
CONSTIPATION: 0

## 2024-03-26 NOTE — PROGRESS NOTES
Cecilia Bazzi is a 63 y.o. female who presents today for Diabetes (6 mo f/u- pt is fasting; pt would like a refill of DROPLET PEN NEEDLE 31 GAUGE X 3/16\" NDLE) and Hypertension  .      She has a history of   Patient Active Problem List   Diagnosis    Type 2 diabetes with nephropathy (HCC)    S/P cholecystectomy    Class 1 obesity in adult    Acute cholecystitis    Hypertension    Choledocholithiasis    DM (diabetes mellitus), type 2 (HCC)    Acute cholecystitis due to biliary calculus    Loculated pleural effusion   .    Today patient is here for follow-up.   she does not have other concerns.    Diabetes: Patient has remained on basal insulin.  Home blood sugar levels have been mid 100's.     Hypertension- home readings are borderline.   Hypertension ROS: taking medications as instructed, no medication side effects noted, no TIA's, no chest pain on exertion, no dyspnea on exertion, no swelling of ankles     reports that she has never smoked. She has never used smokeless tobacco.    reports that she does not currently use alcohol.   BP Readings from Last 2 Encounters:   03/26/24 138/88   09/25/23 (!) 145/90     Weight is up just a bit.  Has been a bit less active recently.  Her youngest will be getting  in September.  This wedding is in [place]    ROS  Review of Systems   Constitutional:  Negative for fatigue and fever.   HENT:  Negative for congestion and ear pain.    Respiratory:  Negative for chest tightness and shortness of breath.    Cardiovascular:  Negative for chest pain and leg swelling.   Gastrointestinal:  Negative for abdominal pain, constipation and diarrhea.   Endocrine: Negative for polydipsia.   Genitourinary:  Negative for difficulty urinating and dysuria.   Musculoskeletal:  Negative for arthralgias and back pain.   Skin:  Negative for rash.   Neurological:  Negative for dizziness and headaches.   Psychiatric/Behavioral:  Negative for agitation. The patient is not nervous/anxious.

## 2024-03-27 LAB
ALBUMIN SERPL-MCNC: 4.4 G/DL (ref 3.9–4.9)
ALBUMIN/GLOB SERPL: 1.8 {RATIO} (ref 1.2–2.2)
ALP SERPL-CCNC: 125 IU/L (ref 44–121)
ALT SERPL-CCNC: 58 IU/L (ref 0–32)
AST SERPL-CCNC: 53 IU/L (ref 0–40)
BILIRUB SERPL-MCNC: 1 MG/DL (ref 0–1.2)
BUN SERPL-MCNC: 9 MG/DL (ref 8–27)
BUN/CREAT SERPL: 11 (ref 12–28)
CALCIUM SERPL-MCNC: 9.6 MG/DL (ref 8.7–10.3)
CHLORIDE SERPL-SCNC: 103 MMOL/L (ref 96–106)
CO2 SERPL-SCNC: 26 MMOL/L (ref 20–29)
CREAT SERPL-MCNC: 0.8 MG/DL (ref 0.57–1)
EGFRCR SERPLBLD CKD-EPI 2021: 83 ML/MIN/1.73
GLOBULIN SER CALC-MCNC: 2.5 G/DL (ref 1.5–4.5)
GLUCOSE SERPL-MCNC: 194 MG/DL (ref 70–99)
HBA1C MFR BLD: 8.2 % (ref 4.8–5.6)
POTASSIUM SERPL-SCNC: 5.1 MMOL/L (ref 3.5–5.2)
PROT SERPL-MCNC: 6.9 G/DL (ref 6–8.5)
SODIUM SERPL-SCNC: 143 MMOL/L (ref 134–144)

## 2024-07-02 DIAGNOSIS — E11.21 TYPE 2 DIABETES WITH NEPHROPATHY (HCC): ICD-10-CM

## 2024-07-02 RX ORDER — INSULIN GLARGINE 100 [IU]/ML
INJECTION, SOLUTION SUBCUTANEOUS
Qty: 15 ML | Refills: 0 | Status: SHIPPED | OUTPATIENT
Start: 2024-07-02

## 2024-07-15 RX ORDER — BLOOD SUGAR DIAGNOSTIC
STRIP MISCELLANEOUS
Qty: 100 STRIP | Refills: 2 | Status: SHIPPED | OUTPATIENT
Start: 2024-07-15

## 2024-07-15 RX ORDER — LANCETS 33 GAUGE
EACH MISCELLANEOUS
Qty: 200 EACH | Refills: 2 | Status: SHIPPED | OUTPATIENT
Start: 2024-07-15

## 2024-08-07 NOTE — OP NOTES
[facility] 79  OPERATIVE REPORT    Name:  Lillian Sheets  MR#:  [id number]  :  1960  ACCOUNT #:  [de-identified]  DATE OF SERVICE:  2019    PREOPERATIVE DIAGNOSIS:  Acute cholecystitis. POSTOPERATIVE DIAGNOSIS:  Severe acute-on-chronic cholecystitis. PROCEDURE PERFORMED:  Laparoscopic cholecystectomy. SURGEON:  Radha Bhandari MD    ASSISTANT:  Hair Alexandre    ANESTHESIA:  General.    COMPLICATIONS:  None. SPECIMENS REMOVED:  Gallbladder pack full of stones. ESTIMATED BLOOD LOSS:  Minimal.    POSTOPERATIVE CONDITION:  Good. COUNTS:  Sponge, needle, and instrument counts were correct. DRAINS:  A 19-Kazakh round Melvin Talat, Mo's pouch. INTRAOPERATIVE FINDINGS:  1. The gallbladder was completely full of pus and had a large stone volume. 2.  She had severe chronic thickening scar with acute inflammatory change superimposed on this. INDICATION FOR PROCEDURE:  A 40-year-old  woman who presents with approximately a 2-1/2-week history of intermittent epigastric and right upper quadrant pain, severe over the last 3 days. CT shows changes consistent with acute cholecystitis. WBC is normal.  Liver function tests are normal.  Bile duct is not dilated on CT. She is now here to undergo laparoscopic cholecystectomy for the same. DESCRIPTION OF PROCEDURE:  After appropriate informed consent was obtained, the patient was brought to the operating room and placed in the supine position. General anesthesia was induced. The abdomen was sterilely prepped and draped. She was already given Zosyn IV in the emergency room, so no additional antibiotics were warranted or given. After adequate pneumoperitoneum was gained with Veress needle, a 68-KP supraumbilical trocar was inserted.   The site underneath it was surveyed to exclude occult bowel injury and subsequently, a 5-mm subxiphoid and two 5-mm right upper quadrant trocars were placed under direct [FreeTextEntry1] : 66 y/o M with PMH of HTN, HLD, arthritis, asthma, hypothyroidism, primary liver hepatocellular carcinoma, clear cell carcinoma of R kidney, DM2, afib, s/p left hip replacement presents for an annual visit. States that he uses his CPAP to sleep. Patient stated he follows up with an oncologist, cardiologist, rheumatologist, orthopedics, urology, follows up with a provider regarding his DM2. He is complaining of dry skin and two itchy wounds on his nose. He is also complaining of worsening shortness of breath that occurs any time he is active. Denies any cough, sputum, sore throat, wheezing, fever, chills or recent changes in weight  Care plan reviewed Labs reviewed A1C: 8.8 Spirometry:FEV1 43% Pulmonary referral Fecal Occult Blood Immunology Recommended moisturizing Medications reviewed and renewed Encouraged continued follow up w specialists  FTC 3 mo vision. The gallbladder was grasped and noted superiorly. It was very chronically thickened and scarred and had some acute inflammatory change. On top of this, due to the large stone volume, the amount of distention and thickening, even with gentle retraction, an unintentional rent was created and basically gross pus came out of the opening. Further spillage was controlled with the grasping forceps and then the neck of the gallbladder was grasped and elevated superiorly and laterally. Due to the chronic thickening and inflammatory change and scarring, it was very dense around the neck of the gallbladder so it was elected to take it down from the dome down. The liver edge was held up and the gallbladder wall scored and gallbladder dissected free from the liver bed, mostly using the suction . Please note when we first started to retract on the gallbladder, the transverse colon and the duodenum were densely adherent and had to be gently dissected free using the suction . So as we took the gallbladder dome down, we finally reached the neck and in the structures, they were gently  out very carefully and tediously using [place] forceps and the cystic duct was identified. It was dissected free using solitary tubular retractor ramifying directly into the neck of the gallbladder. Critical safe view was obtained and it was clipped and divided more posterior and medially and the anterior branch of the cystic artery was clipped and divided and there was what appeared to be probably a lymphatic branch more posterolateral to this and it was controlled with some clips also. After transection, the gallbladder was placed in an EndoCatch pouch for later extraction. The right upper quadrant was gently irrigated and checked carefully for hemostasis.   The SHERIDAN drain was brought into the subxiphoid trocar site and up to the most lateral trocar site, placed in Mo's pouch, trimmed to the appropriate length, placed on bulb suction and anchored to the skin with a 2-0 nylon. The wounds were all infiltrated with local anesthetic and then fascia at the umbilicus reapproximated after retrieving the specimen in the EndoCatch pouch and sending it for Pathology. It required extension of the fascia and the skin incision slightly to accommodate the chronic thickening inflammation and large volume of stones. The fascial layer was closed using interrupted figure-of-eight sutures of 0 Vicryl and then the wound was re-infiltrated with local anesthetic and the skin closed using running absorbable sutures and sealed with Dermabond. The patient was released from the OR to the recovery room in stable condition.     Thank you for your kind referral.        Kathrynn Sever, MD      [id number]  D:  08/11/2019 21:13  T:  08/11/2019 22:34  JOB #:  [id number]  CC:  Nevaeh Ortiz NP

## 2024-09-02 DIAGNOSIS — E11.21 TYPE 2 DIABETES WITH NEPHROPATHY (HCC): ICD-10-CM

## 2024-09-03 DIAGNOSIS — I10 HYPERTENSION, UNSPECIFIED TYPE: ICD-10-CM

## 2024-09-03 RX ORDER — INSULIN GLARGINE 100 [IU]/ML
INJECTION, SOLUTION SUBCUTANEOUS
Qty: 15 ML | Refills: 3 | Status: SHIPPED | OUTPATIENT
Start: 2024-09-03

## 2024-09-03 RX ORDER — INSULIN GLARGINE 100 [IU]/ML
INJECTION, SOLUTION SUBCUTANEOUS
Qty: 5 ADJUSTABLE DOSE PRE-FILLED PEN SYRINGE | Refills: 3 | Status: SHIPPED | OUTPATIENT
Start: 2024-09-03

## 2024-09-04 RX ORDER — VALSARTAN AND HYDROCHLOROTHIAZIDE 320; 12.5 MG/1; MG/1
1 TABLET, FILM COATED ORAL DAILY
Qty: 90 TABLET | Refills: 1 | Status: SHIPPED | OUTPATIENT
Start: 2024-09-04

## 2024-09-26 ENCOUNTER — OFFICE VISIT (OUTPATIENT)
Age: 64
End: 2024-09-26
Payer: COMMERCIAL

## 2024-09-26 VITALS
RESPIRATION RATE: 16 BRPM | DIASTOLIC BLOOD PRESSURE: 88 MMHG | HEIGHT: 62 IN | HEART RATE: 115 BPM | WEIGHT: 209 LBS | SYSTOLIC BLOOD PRESSURE: 138 MMHG | TEMPERATURE: 98.4 F | OXYGEN SATURATION: 96 % | BODY MASS INDEX: 38.46 KG/M2

## 2024-09-26 DIAGNOSIS — E78.5 HYPERLIPIDEMIA, UNSPECIFIED HYPERLIPIDEMIA TYPE: ICD-10-CM

## 2024-09-26 DIAGNOSIS — E66.9 CLASS 1 OBESITY IN ADULT, UNSPECIFIED BMI, UNSPECIFIED OBESITY TYPE, UNSPECIFIED WHETHER SERIOUS COMORBIDITY PRESENT: ICD-10-CM

## 2024-09-26 DIAGNOSIS — E11.21 TYPE 2 DIABETES WITH NEPHROPATHY (HCC): ICD-10-CM

## 2024-09-26 DIAGNOSIS — I10 HYPERTENSION, UNSPECIFIED TYPE: ICD-10-CM

## 2024-09-26 DIAGNOSIS — Z00.00 WELLNESS EXAMINATION: Primary | ICD-10-CM

## 2024-09-26 PROCEDURE — 99396 PREV VISIT EST AGE 40-64: CPT | Performed by: INTERNAL MEDICINE

## 2024-09-26 PROCEDURE — 3079F DIAST BP 80-89 MM HG: CPT | Performed by: INTERNAL MEDICINE

## 2024-09-26 PROCEDURE — 3075F SYST BP GE 130 - 139MM HG: CPT | Performed by: INTERNAL MEDICINE

## 2024-09-26 ASSESSMENT — ENCOUNTER SYMPTOMS
CHEST TIGHTNESS: 0
SHORTNESS OF BREATH: 0
ABDOMINAL PAIN: 0
CONSTIPATION: 0
DIARRHEA: 0
BACK PAIN: 0

## 2024-09-27 LAB
ALBUMIN SERPL-MCNC: 4.6 G/DL (ref 3.9–4.9)
ALBUMIN/CREAT UR: 138 MG/G CREAT (ref 0–29)
ALP SERPL-CCNC: 124 IU/L (ref 44–121)
ALT SERPL-CCNC: 46 IU/L (ref 0–32)
AST SERPL-CCNC: 36 IU/L (ref 0–40)
BASOPHILS # BLD AUTO: 0.1 X10E3/UL (ref 0–0.2)
BASOPHILS NFR BLD AUTO: 1 %
BILIRUB SERPL-MCNC: 0.7 MG/DL (ref 0–1.2)
BUN SERPL-MCNC: 15 MG/DL (ref 8–27)
BUN/CREAT SERPL: 18 (ref 12–28)
CALCIUM SERPL-MCNC: 9.5 MG/DL (ref 8.7–10.3)
CHLORIDE SERPL-SCNC: 100 MMOL/L (ref 96–106)
CHOLEST SERPL-MCNC: 175 MG/DL (ref 100–199)
CO2 SERPL-SCNC: 23 MMOL/L (ref 20–29)
CREAT SERPL-MCNC: 0.83 MG/DL (ref 0.57–1)
CREAT UR-MCNC: 50.7 MG/DL
EGFRCR SERPLBLD CKD-EPI 2021: 79 ML/MIN/1.73
EOSINOPHIL # BLD AUTO: 0.2 X10E3/UL (ref 0–0.4)
EOSINOPHIL NFR BLD AUTO: 2 %
ERYTHROCYTE [DISTWIDTH] IN BLOOD BY AUTOMATED COUNT: 14.1 % (ref 11.7–15.4)
GLOBULIN SER CALC-MCNC: 2.7 G/DL (ref 1.5–4.5)
GLUCOSE SERPL-MCNC: 242 MG/DL (ref 70–99)
HBA1C MFR BLD: 9.4 % (ref 4.8–5.6)
HCT VFR BLD AUTO: 51.6 % (ref 34–46.6)
HDLC SERPL-MCNC: 50 MG/DL
HGB BLD-MCNC: 16.7 G/DL (ref 11.1–15.9)
IMM GRANULOCYTES # BLD AUTO: 0 X10E3/UL (ref 0–0.1)
IMM GRANULOCYTES NFR BLD AUTO: 1 %
LDLC SERPL CALC-MCNC: 105 MG/DL (ref 0–99)
LYMPHOCYTES # BLD AUTO: 2.1 X10E3/UL (ref 0.7–3.1)
LYMPHOCYTES NFR BLD AUTO: 28 %
MCH RBC QN AUTO: 29.1 PG (ref 26.6–33)
MCHC RBC AUTO-ENTMCNC: 32.4 G/DL (ref 31.5–35.7)
MCV RBC AUTO: 90 FL (ref 79–97)
MICROALBUMIN UR-MCNC: 70.2 UG/ML
MONOCYTES # BLD AUTO: 0.5 X10E3/UL (ref 0.1–0.9)
MONOCYTES NFR BLD AUTO: 7 %
NEUTROPHILS # BLD AUTO: 4.7 X10E3/UL (ref 1.4–7)
NEUTROPHILS NFR BLD AUTO: 61 %
PLATELET # BLD AUTO: 183 X10E3/UL (ref 150–450)
POTASSIUM SERPL-SCNC: 3.9 MMOL/L (ref 3.5–5.2)
PROT SERPL-MCNC: 7.3 G/DL (ref 6–8.5)
RBC # BLD AUTO: 5.74 X10E6/UL (ref 3.77–5.28)
SODIUM SERPL-SCNC: 141 MMOL/L (ref 134–144)
TRIGL SERPL-MCNC: 110 MG/DL (ref 0–149)
VLDLC SERPL CALC-MCNC: 20 MG/DL (ref 5–40)
WBC # BLD AUTO: 7.7 X10E3/UL (ref 3.4–10.8)

## 2024-09-28 LAB
IMP & REVIEW OF LAB RESULTS: NORMAL
Lab: NORMAL

## 2024-10-15 ENCOUNTER — HOSPITAL ENCOUNTER (OUTPATIENT)
Facility: HOSPITAL | Age: 64
Discharge: HOME | End: 2024-10-18
Payer: COMMERCIAL

## 2024-10-15 DIAGNOSIS — Z00.00 WELLNESS EXAMINATION: ICD-10-CM

## 2024-10-15 PROCEDURE — 77063 BREAST TOMOSYNTHESIS BI: CPT

## 2025-01-10 DIAGNOSIS — E11.21 TYPE 2 DIABETES WITH NEPHROPATHY (HCC): ICD-10-CM

## 2025-01-10 RX ORDER — PEN NEEDLE, DIABETIC 30 GX3/16"
1 NEEDLE, DISPOSABLE MISCELLANEOUS DAILY
Qty: 100 EACH | Refills: 1 | Status: SHIPPED | OUTPATIENT
Start: 2025-01-10

## 2025-03-02 DIAGNOSIS — E11.21 TYPE 2 DIABETES WITH NEPHROPATHY (HCC): ICD-10-CM

## 2025-03-03 RX ORDER — INSULIN GLARGINE 100 [IU]/ML
INJECTION, SOLUTION SUBCUTANEOUS
Qty: 3 ADJUSTABLE DOSE PRE-FILLED PEN SYRINGE | Refills: 3 | Status: SHIPPED | OUTPATIENT
Start: 2025-03-03

## 2025-03-03 RX ORDER — INSULIN GLARGINE 100 [IU]/ML
INJECTION, SOLUTION SUBCUTANEOUS
Qty: 15 ML | Refills: 3 | Status: SHIPPED | OUTPATIENT
Start: 2025-03-03

## 2025-03-22 DIAGNOSIS — I10 HYPERTENSION, UNSPECIFIED TYPE: ICD-10-CM

## 2025-03-24 RX ORDER — VALSARTAN AND HYDROCHLOROTHIAZIDE 320; 12.5 MG/1; MG/1
1 TABLET, FILM COATED ORAL DAILY
Qty: 90 TABLET | Refills: 1 | Status: SHIPPED | OUTPATIENT
Start: 2025-03-24

## 2025-03-26 ENCOUNTER — OFFICE VISIT (OUTPATIENT)
Facility: CLINIC | Age: 65
End: 2025-03-26
Payer: COMMERCIAL

## 2025-03-26 VITALS
SYSTOLIC BLOOD PRESSURE: 130 MMHG | HEART RATE: 111 BPM | WEIGHT: 206 LBS | HEIGHT: 62 IN | TEMPERATURE: 98.3 F | DIASTOLIC BLOOD PRESSURE: 85 MMHG | OXYGEN SATURATION: 95 % | RESPIRATION RATE: 16 BRPM | BODY MASS INDEX: 37.91 KG/M2

## 2025-03-26 DIAGNOSIS — I10 HYPERTENSION, UNSPECIFIED TYPE: ICD-10-CM

## 2025-03-26 DIAGNOSIS — I10 HYPERTENSION, UNSPECIFIED TYPE: Primary | ICD-10-CM

## 2025-03-26 DIAGNOSIS — E11.21 TYPE 2 DIABETES WITH NEPHROPATHY (HCC): ICD-10-CM

## 2025-03-26 DIAGNOSIS — E66.811 CLASS 1 OBESITY IN ADULT, UNSPECIFIED BMI, UNSPECIFIED OBESITY TYPE, UNSPECIFIED WHETHER SERIOUS COMORBIDITY PRESENT: ICD-10-CM

## 2025-03-26 DIAGNOSIS — E78.5 HYPERLIPIDEMIA, UNSPECIFIED HYPERLIPIDEMIA TYPE: ICD-10-CM

## 2025-03-26 PROCEDURE — 99214 OFFICE O/P EST MOD 30 MIN: CPT | Performed by: INTERNAL MEDICINE

## 2025-03-26 PROCEDURE — 3079F DIAST BP 80-89 MM HG: CPT | Performed by: INTERNAL MEDICINE

## 2025-03-26 PROCEDURE — 3075F SYST BP GE 130 - 139MM HG: CPT | Performed by: INTERNAL MEDICINE

## 2025-03-26 SDOH — ECONOMIC STABILITY: FOOD INSECURITY: WITHIN THE PAST 12 MONTHS, THE FOOD YOU BOUGHT JUST DIDN'T LAST AND YOU DIDN'T HAVE MONEY TO GET MORE.: NEVER TRUE

## 2025-03-26 SDOH — ECONOMIC STABILITY: FOOD INSECURITY: WITHIN THE PAST 12 MONTHS, YOU WORRIED THAT YOUR FOOD WOULD RUN OUT BEFORE YOU GOT MONEY TO BUY MORE.: NEVER TRUE

## 2025-03-26 ASSESSMENT — PATIENT HEALTH QUESTIONNAIRE - PHQ9
SUM OF ALL RESPONSES TO PHQ QUESTIONS 1-9: 0
1. LITTLE INTEREST OR PLEASURE IN DOING THINGS: NOT AT ALL
SUM OF ALL RESPONSES TO PHQ QUESTIONS 1-9: 0
2. FEELING DOWN, DEPRESSED OR HOPELESS: NOT AT ALL

## 2025-03-26 ASSESSMENT — ENCOUNTER SYMPTOMS
CONSTIPATION: 0
ABDOMINAL PAIN: 0
DIARRHEA: 0
BACK PAIN: 0
SHORTNESS OF BREATH: 0
CHEST TIGHTNESS: 0

## 2025-03-26 NOTE — PROGRESS NOTES
Cecilia Bazzi is a 64 y.o. female who presents today for Diabetes (6 mo f/u;)  .      She has a history of   Patient Active Problem List   Diagnosis    Type 2 diabetes with nephropathy (HCC)    S/P cholecystectomy    Class 1 obesity in adult    Acute cholecystitis    Hypertension    Choledocholithiasis    DM (diabetes mellitus), type 2 (HCC)    Acute cholecystitis due to biliary calculus    Loculated pleural effusion   .    Today patient is here for follow up.     History of Present Illness  The patient is a 64-year-old female who presents for a 6-month follow-up. At her last visit, her blood sugar levels and A1c were quite elevated. There was some discussion via the portal about additional medications to her basal insulin, but ultimately nothing was started. Her blood pressure today is moderately elevated, and no changes have been made to her blood pressure medications.    She reports that her blood glucose levels have remained in the high 180s to 190s in the morning, with no significant changes observed throughout the day. She anticipates that her blood glucose levels will remain consistent. She has not yet scheduled an eye examination and reports experiencing dry eyes and increased blinking.     A brief period of metformin use was recalled, which was discontinued due to hospitalization and subsequent initiation of insulin therapy. She does not experience diarrhea following cholecystectomy. She has been on a regimen of 30 units of insulin since 2021, with no alterations in dosage.    A rapid heart rate is reported, which she attributes to her excitable nature. She does not monitor her heart rate at home.     A weight gain of 50 pounds was mentioned, although her weight has remained stable recently.    PAST SURGICAL HISTORY: Cholecystectomy        ROS  Review of Systems   Constitutional:  Negative for fatigue and fever.   HENT:  Negative for congestion and ear pain.    Respiratory:  Negative for chest tightness

## 2025-03-27 LAB
ALBUMIN/CREAT UR: 18 MG/G CREAT (ref 0–29)
BASOPHILS # BLD AUTO: 0.1 X10E3/UL (ref 0–0.2)
BASOPHILS NFR BLD AUTO: 1 %
BUN SERPL-MCNC: 11 MG/DL (ref 8–27)
BUN/CREAT SERPL: 14 (ref 12–28)
CALCIUM SERPL-MCNC: 9.5 MG/DL (ref 8.7–10.3)
CHLORIDE SERPL-SCNC: 100 MMOL/L (ref 96–106)
CO2 SERPL-SCNC: 25 MMOL/L (ref 20–29)
CREAT SERPL-MCNC: 0.81 MG/DL (ref 0.57–1)
CREAT UR-MCNC: 103.1 MG/DL
EGFRCR SERPLBLD CKD-EPI 2021: 81 ML/MIN/1.73
EOSINOPHIL # BLD AUTO: 0.1 X10E3/UL (ref 0–0.4)
EOSINOPHIL NFR BLD AUTO: 2 %
ERYTHROCYTE [DISTWIDTH] IN BLOOD BY AUTOMATED COUNT: 13.7 % (ref 11.7–15.4)
GLUCOSE SERPL-MCNC: 215 MG/DL (ref 70–99)
HBA1C MFR BLD: 9.7 % (ref 4.8–5.6)
HCT VFR BLD AUTO: 48.3 % (ref 34–46.6)
HGB BLD-MCNC: 16.1 G/DL (ref 11.1–15.9)
IMM GRANULOCYTES # BLD AUTO: 0 X10E3/UL (ref 0–0.1)
IMM GRANULOCYTES NFR BLD AUTO: 0 %
LYMPHOCYTES # BLD AUTO: 1.5 X10E3/UL (ref 0.7–3.1)
LYMPHOCYTES NFR BLD AUTO: 26 %
MCH RBC QN AUTO: 29.5 PG (ref 26.6–33)
MCHC RBC AUTO-ENTMCNC: 33.3 G/DL (ref 31.5–35.7)
MCV RBC AUTO: 89 FL (ref 79–97)
MICROALBUMIN UR-MCNC: 18.5 UG/ML
MONOCYTES # BLD AUTO: 0.4 X10E3/UL (ref 0.1–0.9)
MONOCYTES NFR BLD AUTO: 7 %
NEUTROPHILS # BLD AUTO: 3.7 X10E3/UL (ref 1.4–7)
NEUTROPHILS NFR BLD AUTO: 64 %
PLATELET # BLD AUTO: 166 X10E3/UL (ref 150–450)
POTASSIUM SERPL-SCNC: 4 MMOL/L (ref 3.5–5.2)
RBC # BLD AUTO: 5.45 X10E6/UL (ref 3.77–5.28)
SODIUM SERPL-SCNC: 140 MMOL/L (ref 134–144)
WBC # BLD AUTO: 5.8 X10E3/UL (ref 3.4–10.8)

## 2025-03-28 ENCOUNTER — RESULTS FOLLOW-UP (OUTPATIENT)
Facility: CLINIC | Age: 65
End: 2025-03-28

## 2025-05-09 RX ORDER — BLOOD SUGAR DIAGNOSTIC
STRIP MISCELLANEOUS
Qty: 100 STRIP | Refills: 2 | Status: SHIPPED | OUTPATIENT
Start: 2025-05-09

## 2025-06-26 ENCOUNTER — OFFICE VISIT (OUTPATIENT)
Facility: CLINIC | Age: 65
End: 2025-06-26
Payer: COMMERCIAL

## 2025-06-26 VITALS
OXYGEN SATURATION: 95 % | SYSTOLIC BLOOD PRESSURE: 138 MMHG | RESPIRATION RATE: 16 BRPM | HEIGHT: 62 IN | WEIGHT: 204 LBS | DIASTOLIC BLOOD PRESSURE: 88 MMHG | HEART RATE: 100 BPM | BODY MASS INDEX: 37.54 KG/M2 | TEMPERATURE: 98.2 F

## 2025-06-26 DIAGNOSIS — I10 HYPERTENSION, UNSPECIFIED TYPE: ICD-10-CM

## 2025-06-26 DIAGNOSIS — E11.21 TYPE 2 DIABETES WITH NEPHROPATHY (HCC): Primary | ICD-10-CM

## 2025-06-26 DIAGNOSIS — E78.5 HYPERLIPIDEMIA, UNSPECIFIED HYPERLIPIDEMIA TYPE: ICD-10-CM

## 2025-06-26 DIAGNOSIS — E66.811 CLASS 1 OBESITY IN ADULT, UNSPECIFIED BMI, UNSPECIFIED OBESITY TYPE, UNSPECIFIED WHETHER SERIOUS COMORBIDITY PRESENT: ICD-10-CM

## 2025-06-26 LAB — HBA1C MFR BLD: 9.3 %

## 2025-06-26 PROCEDURE — 99213 OFFICE O/P EST LOW 20 MIN: CPT | Performed by: INTERNAL MEDICINE

## 2025-06-26 PROCEDURE — 3079F DIAST BP 80-89 MM HG: CPT | Performed by: INTERNAL MEDICINE

## 2025-06-26 PROCEDURE — 3046F HEMOGLOBIN A1C LEVEL >9.0%: CPT | Performed by: INTERNAL MEDICINE

## 2025-06-26 PROCEDURE — 3075F SYST BP GE 130 - 139MM HG: CPT | Performed by: INTERNAL MEDICINE

## 2025-06-26 PROCEDURE — 83036 HEMOGLOBIN GLYCOSYLATED A1C: CPT | Performed by: INTERNAL MEDICINE

## 2025-06-26 RX ORDER — METFORMIN HYDROCHLORIDE 500 MG/1
1000 TABLET, EXTENDED RELEASE ORAL
Qty: 180 TABLET | Refills: 5 | Status: SHIPPED | OUTPATIENT
Start: 2025-06-26

## 2025-06-26 RX ORDER — BLOOD-GLUCOSE METER
1 KIT MISCELLANEOUS DAILY
Qty: 1 KIT | Refills: 0 | Status: SHIPPED | OUTPATIENT
Start: 2025-06-26

## 2025-06-26 RX ORDER — INSULIN GLARGINE 100 [IU]/ML
INJECTION, SOLUTION SUBCUTANEOUS
Qty: 5 ADJUSTABLE DOSE PRE-FILLED PEN SYRINGE | Refills: 3 | Status: SHIPPED | OUTPATIENT
Start: 2025-06-26

## 2025-06-26 RX ORDER — GLUCOSAMINE HCL/CHONDROITIN SU 500-400 MG
CAPSULE ORAL
Qty: 100 STRIP | Refills: 2 | Status: SHIPPED | OUTPATIENT
Start: 2025-06-26

## 2025-06-26 ASSESSMENT — ENCOUNTER SYMPTOMS
CONSTIPATION: 0
ABDOMINAL PAIN: 0
DIARRHEA: 0
BACK PAIN: 0
CHEST TIGHTNESS: 0
SHORTNESS OF BREATH: 0

## 2025-06-26 NOTE — PROGRESS NOTES
Cecilia Bazzi is a 64 y.o. female who presents today for Diabetes (3 mo f/u;)  .      She has a history of   Patient Active Problem List   Diagnosis    Type 2 diabetes with nephropathy (HCC)    S/P cholecystectomy    Class 1 obesity in adult    Acute cholecystitis    Hypertension    Choledocholithiasis    DM (diabetes mellitus), type 2 (HCC)    Acute cholecystitis due to biliary calculus    Loculated pleural effusion   .    Today patient is here for follow up.     History of Present Illness  The patient is a 64-year-old female who presents for a follow-up visit.    She has been managing her diabetes with Lantus SoloStar, administered at a dosage of 33 units. She monitors her blood glucose levels daily, which typically register around 210 each morning. Despite this, she reports not adhering strictly to her dietary recommendations. She has not yet incorporated metformin into her treatment regimen due to uncertainty about the appropriate dosage. She has requested a prescription for metformin as she believes she may still have some at home but is unsure. Additionally, she has requested a prescription for a glucometer and test strips as her insurance is no longer covering [employer] products.    She has been monitoring her blood pressure at home, which typically registers around 130/80.        ROS  Review of Systems   Constitutional:  Negative for fatigue and fever.   HENT:  Negative for congestion and ear pain.    Respiratory:  Negative for chest tightness and shortness of breath.    Cardiovascular:  Negative for chest pain and leg swelling.   Gastrointestinal:  Negative for abdominal pain, constipation and diarrhea.   Endocrine: Negative for polydipsia.   Genitourinary:  Negative for difficulty urinating and dysuria.   Musculoskeletal:  Negative for arthralgias and back pain.   Skin:  Negative for rash.   Neurological:  Negative for dizziness and headaches.   Psychiatric/Behavioral:  Negative for agitation. The patient

## 2025-07-02 RX ORDER — BLOOD-GLUCOSE METER
KIT MISCELLANEOUS
Qty: 100 EACH | Refills: 3 | Status: SHIPPED | OUTPATIENT
Start: 2025-07-02

## 2025-07-02 RX ORDER — LANCETS 28 GAUGE
EACH MISCELLANEOUS
Qty: 100 EACH | Refills: 3 | Status: SHIPPED | OUTPATIENT
Start: 2025-07-02

## 2025-07-02 RX ORDER — BLOOD-GLUCOSE METER
1 KIT MISCELLANEOUS DAILY
COMMUNITY
End: 2025-07-02 | Stop reason: SDUPTHER

## 2025-07-02 RX ORDER — LANCETS 28 GAUGE
1 EACH MISCELLANEOUS DAILY
COMMUNITY
End: 2025-07-02 | Stop reason: SDUPTHER

## 2025-07-02 RX ORDER — BLOOD-GLUCOSE METER
KIT MISCELLANEOUS
COMMUNITY
End: 2025-07-02 | Stop reason: SDUPTHER

## 2025-07-02 RX ORDER — BLOOD-GLUCOSE METER
KIT MISCELLANEOUS
Qty: 1 KIT | Refills: 3 | Status: SHIPPED | OUTPATIENT
Start: 2025-07-02

## 2025-07-03 DIAGNOSIS — E11.21 TYPE 2 DIABETES WITH NEPHROPATHY (HCC): ICD-10-CM

## 2025-07-03 RX ORDER — PEN NEEDLE, DIABETIC 31 GX5/16"
NEEDLE, DISPOSABLE MISCELLANEOUS
Qty: 100 EACH | Refills: 1 | Status: SHIPPED | OUTPATIENT
Start: 2025-07-03

## 2025-07-03 NOTE — TELEPHONE ENCOUNTER
Last visit 6/26/25  Follow up 9/26/25    Lab Results   Component Value Date     03/26/2025    K 4.0 03/26/2025     03/26/2025    CO2 25 03/26/2025    BUN 11 03/26/2025    CREATININE 0.81 03/26/2025    GLUCOSE 215 (H) 03/26/2025    CALCIUM 9.5 03/26/2025    LABALBU 70.2 09/26/2024    BILITOT 0.7 09/26/2024    ALKPHOS 124 (H) 09/26/2024    AST 36 09/26/2024    ALT 46 (H) 09/26/2024    LABGLOM 81 03/26/2025    GFRAA 91 04/14/2021    AGRATIO 1.8 03/26/2024    GLOB 4.8 (H) 01/19/2023     Lab Results   Component Value Date    WBC 5.8 03/26/2025    HGB 16.1 (H) 03/26/2025    HCT 48.3 (H) 03/26/2025    MCV 89 03/26/2025     03/26/2025     
Pito Reynaga(Attending)

## 2025-07-16 ENCOUNTER — OFFICE VISIT (OUTPATIENT)
Facility: CLINIC | Age: 65
End: 2025-07-16
Payer: COMMERCIAL

## 2025-07-16 VITALS
BODY MASS INDEX: 36.62 KG/M2 | HEIGHT: 62 IN | WEIGHT: 199 LBS | SYSTOLIC BLOOD PRESSURE: 137 MMHG | DIASTOLIC BLOOD PRESSURE: 93 MMHG | TEMPERATURE: 98.6 F | HEART RATE: 129 BPM | OXYGEN SATURATION: 96 % | RESPIRATION RATE: 16 BRPM

## 2025-07-16 DIAGNOSIS — I10 HYPERTENSION, UNSPECIFIED TYPE: ICD-10-CM

## 2025-07-16 DIAGNOSIS — E11.21 TYPE 2 DIABETES WITH NEPHROPATHY (HCC): ICD-10-CM

## 2025-07-16 DIAGNOSIS — E66.01 MORBID (SEVERE) OBESITY DUE TO EXCESS CALORIES (HCC): ICD-10-CM

## 2025-07-16 DIAGNOSIS — J32.9 SINUSITIS, UNSPECIFIED CHRONICITY, UNSPECIFIED LOCATION: Primary | ICD-10-CM

## 2025-07-16 PROCEDURE — 3080F DIAST BP >= 90 MM HG: CPT | Performed by: INTERNAL MEDICINE

## 2025-07-16 PROCEDURE — 99213 OFFICE O/P EST LOW 20 MIN: CPT | Performed by: INTERNAL MEDICINE

## 2025-07-16 PROCEDURE — 3046F HEMOGLOBIN A1C LEVEL >9.0%: CPT | Performed by: INTERNAL MEDICINE

## 2025-07-16 PROCEDURE — 3075F SYST BP GE 130 - 139MM HG: CPT | Performed by: INTERNAL MEDICINE

## 2025-07-16 ASSESSMENT — ENCOUNTER SYMPTOMS
SHORTNESS OF BREATH: 0
CONSTIPATION: 0
ABDOMINAL PAIN: 0
DIARRHEA: 0
SINUS PRESSURE: 1
CHEST TIGHTNESS: 0
SORE THROAT: 1
BACK PAIN: 0
COUGH: 1

## 2025-07-18 RX ORDER — AZITHROMYCIN 250 MG/1
TABLET, FILM COATED ORAL
Qty: 6 TABLET | Refills: 0 | Status: SHIPPED | OUTPATIENT
Start: 2025-07-18 | End: 2025-07-28

## (undated) DEVICE — COVER LT HNDL PLAS RIG 1 PER PK

## (undated) DEVICE — Device: Brand: ENDO SMARTCAP

## (undated) DEVICE — STRAP,POSITIONING,KNEE/BODY,FOAM,4X60": Brand: MEDLINE

## (undated) DEVICE — DEVICE LCK BILI RAP EXCHG OLPS --

## (undated) DEVICE — BLOCK BITE BLOX W DENTAL RIM --

## (undated) DEVICE — TROCAR ENDOSCP L100MM DIA5MM BLDELSS STBL SL THRD OPT VW

## (undated) DEVICE — RETRIEVAL BALLOON CATHETER: Brand: EXTRACTOR™ PRO RX

## (undated) DEVICE — SUTURE SZ 0 27IN 5/8 CIR UR-6  TAPER PT VIOLET ABSRB VICRYL J603H

## (undated) DEVICE — SUTURE MCRYL SZ 4-0 L27IN ABSRB UD L19MM PS-2 1/2 CIR PRIM Y426H

## (undated) DEVICE — ADULT SPO2 SENSOR: Brand: NELLCOR

## (undated) DEVICE — ENDO CARRY-ON PROCEDURE KIT INCLUDES ENZYMATIC SPONGE, GAUZE, BIOHAZARD LABEL, TRAY, LUBRICANT, DIRTY SCOPE LABEL, WATER LABEL, TRAY, DRAWSTRING PAD, AND DEFENDO 4-PIECE KIT.: Brand: ENDO CARRY-ON PROCEDURE KIT

## (undated) DEVICE — REM POLYHESIVE ADULT PATIENT RETURN ELECTRODE: Brand: VALLEYLAB

## (undated) DEVICE — POSITIONER HD REST SUPINE LAT

## (undated) DEVICE — APPLIER LIG CLP 5MM CONTAIN 16 TI CLP DISP ENDO CLP

## (undated) DEVICE — Device

## (undated) DEVICE — 1200 GUARD II KIT W/5MM TUBE W/O VAC TUBE: Brand: GUARDIAN

## (undated) DEVICE — STERILE POLYISOPRENE POWDER-FREE SURGICAL GLOVES: Brand: PROTEXIS

## (undated) DEVICE — SOLUTION IRRIG 1000ML H2O STRL BLT

## (undated) DEVICE — BW-412T DISP COMBO CLEANING BRUSH: Brand: SINGLE USE COMBINATION CLEANING BRUSH

## (undated) DEVICE — INFECTION CONTROL KIT SYS

## (undated) DEVICE — E-Z CLEAN, PTFE COATED, ELECTROSURGICAL LAPAROSCOPIC ELECTRODE, L-HOOK, 33 CM., SINGLE-USE, FOR USE WITH HAND CONTROL PENCIL: Brand: MEGADYNE

## (undated) DEVICE — INTENDED FOR TISSUE SEPARATION, AND OTHER PROCEDURES THAT REQUIRE A SHARP SURGICAL BLADE TO PUNCTURE OR CUT.: Brand: BARD-PARKER ® CARBON RIB-BACK BLADES

## (undated) DEVICE — SLEEVE TRCR L100MM DIA5MM UNIV STBL FOR BLDELSS DIL TIP

## (undated) DEVICE — BALLOON DILATATION CATHETER: Brand: HURRICANE™ RX

## (undated) DEVICE — DEVICE TRNSF SPIK STL 2008S] MICROTEK MEDICAL INC]

## (undated) DEVICE — INFLATION DEVICE: Brand: ENCORE 26

## (undated) DEVICE — TROCAR ENDOSCP L100MM DIA12MM STBL SL BLDELSS ENDOPATH XCEL

## (undated) DEVICE — TUBING, SUCTION, 1/4" X 10', STRAIGHT: Brand: MEDLINE

## (undated) DEVICE — GUIDEWIRE ENDOSCP WRK L450CM DIA0.035IN STD SHFT STR RND

## (undated) DEVICE — DRAPE,REIN 53X77,STERILE: Brand: MEDLINE

## (undated) DEVICE — KIT,1200CC CANISTER,3/16"X6' TUBING: Brand: MEDLINE INDUSTRIES, INC.

## (undated) DEVICE — SOL IRRIGATION INJ NACL 0.9% 500ML BTL

## (undated) DEVICE — NEEDLE HYPO 22GA L1.5IN BLK S STL HUB POLYPR SHLD REG BVL

## (undated) DEVICE — SPHINCTEROTOME: Brand: DREAMTOME™ RX 44

## (undated) DEVICE — BAG SPEC REM 224ML W4XL6IN DIA10MM 1 HND GYN DISP ENDOPCH

## (undated) DEVICE — APPLICATOR BNDG 1MM ADH PREMIERPRO EXOFIN

## (undated) DEVICE — TOWEL,OR,DSP,ST,BLUE,STD,2/PK,40PK/CS: Brand: MEDLINE

## (undated) DEVICE — SYR 50ML SLIP TIP NSAF LF STRL --

## (undated) DEVICE — (D)PREP SKN CHLRAPRP APPL 26ML -- CONVERT TO ITEM 371833

## (undated) DEVICE — CANISTER, RIGID, 3000CC: Brand: MEDLINE INDUSTRIES, INC.

## (undated) DEVICE — SURGICAL PROCEDURE KIT GEN LAPAROSCOPY LF

## (undated) DEVICE — NEEDLE INSUF L120MM DIA2MM DISP FOR PNEUMOPERI ENDOPATH

## (undated) DEVICE — SNARE ENDOSCP M L240CM W27MM SHTH DIA2.4MM CHN 2.8MM OVL